# Patient Record
(demographics unavailable — no encounter records)

---

## 2024-10-23 NOTE — HISTORY OF PRESENT ILLNESS
[de-identified] : 75 yo female presents with low back pain.  She states that she has had this pain for years.  She also describes a fairly severe bilateral knee pain.  She denies any bowel bladder issues.  She denies any toxicity.  She does have a history of cancer, breast cancer with known brain metastases.  She denies any new issues with balance.

## 2024-10-23 NOTE — PHYSICAL EXAM
[de-identified] : Gait - Normal  Station - Normal   Sagittal balance - Normal  Compensatory mechanism - None  Heel Walk - Normal  Toe Walk - Normal antalgic gait  Reflexes:   Patellar: Normal   Gastroc: Normal   Clonus: No  Straight leg raise: negative  Pulses: 2+ dp/pt  Range of motion - normal   Sensation    Sensation is intact to light touch in the L1, L2, L3, L4, L5 and S1 dermatomes bilaterally.  Motor              IP           Quad         HS       TA      Gastroc      EHL Right:   3+/5           5/5           5/5      5/5          5/5           5/5 Left:     5/5           5/5           5/5      5/5          5/5           5/5    [de-identified] : xray lumbar spine 4 views Severe facet arthropathy Disc degeneration  Bilateral knee x-rays show severe OA

## 2024-10-23 NOTE — ASSESSMENT
[FreeTextEntry1] :   I had a lengthy discussion with the patient in regards to their treatment plan and diagnosis.  They do have objective weakness findings on my exam.  Their symptoms have persisted despite the conservative management they have attempted thus far.  As a result I would like to proceed with a lumbar MRI.  In tandem with this they should begin physical therapy/home therapy program.  The patient can take Tylenol/NSAIDs as needed for pain control if medically able to.  I will have the patient follow-up in 3 to 4 weeks for repeat clinical evaluation.  I encouraged them to follow-up sooner if their symptoms worsen or change in any way.

## 2024-11-13 NOTE — HISTORY OF PRESENT ILLNESS
[de-identified] : 75 yo female presents with low back pain.  She states that she is still having some fairly severe pain down her left leg.  The pain down her right leg is not as bad.  She is also dealing with some fairly substantial back pain.  She denies any bowel bladder issues.  She denies any saddle anesthesia.  10.23.24 75 yo female presents with low back pain.  She states that she has had this pain for years.  She also describes a fairly severe bilateral knee pain.  She denies any bowel bladder issues.  She denies any toxicity.  She does have a history of cancer, breast cancer with known brain metastases.  She denies any new issues with balance.

## 2024-11-13 NOTE — ASSESSMENT
[FreeTextEntry1] : I had a long discussion with the patient regarding her treatment plan and diagnosis.  She does have symptomatic lumbar radiculopathy in the setting of disc herniations at L3-L4 and L4-L5.  We discussed various treatment options including surgery versus conservative care.  At this point the patient would like to further pursue conservative management of her symptoms.  Surgery was offered.  She would like to follow-up with pain management and an appropriate referral has been placed today.  She should also start a course of medication to help with her pain.  I will see her back in 4 to 5 weeks.  All questions were answered.

## 2024-11-13 NOTE — PHYSICAL EXAM
[de-identified] : Antalgic gait, walks with a cane  Reflexes:   Patellar: Normal   Gastroc: Normal   Clonus: No  Straight leg raise: negative  Pulses: 2+ dp/pt  Range of motion - normal   Sensation    Sensation is intact to light touch in the L1, L2, L3, L4, L5 and S1 dermatomes bilaterally.  Motor              IP           Quad         HS       TA      Gastroc      EHL Right:   4/5           5/5           5/5      5/5          5/5           5/5 Left:     5/5           5/5           5/5      5/5          5/5           5/5    [de-identified] : xray lumbar spine 4 views Severe facet arthropathy Disc degeneration  Bilateral knee x-rays show severe OA  Lumbar MRI reviewed L3-L4 and L4-L5 disc herniations

## 2024-11-20 NOTE — PHYSICAL EXAM
[Restricted in physically strenuous activity but ambulatory and able to carry out work of a light or sedentary nature] : Status 1- Restricted in physically strenuous activity but ambulatory and able to carry out work of a light or sedentary nature, e.g., light house work, office work [Obese] : obese [Normal] : affect appropriate [de-identified] : port in left chest wall c/d/i , chronic ankle/pedal swelling noted  rt. >left.. Chronic stable B/L LE edema  [de-identified] : Right breast- right breast slightly bigger than left breast + skin thickening in the outer lower quadrant with vague fullness. cellulitis resolved. No LN palpable UNCHANGED [de-identified] : bilateral pitting edema+2

## 2024-11-20 NOTE — HISTORY OF PRESENT ILLNESS
[Patient Refusal] : Patient refused psychosocial distress assessment [ECOG Performance Status: 1 - Restricted in physically strenuous activity but ambulatory and able to carry out work of a light or sedentary nature] : Performance Status: 1 - Restricted in physically strenuous activity but ambulatory and able to carry out work of a light or sedentary nature, e.g., light house work, office work [IV] : IV [de-identified] : Ms. Cardozo is a 74-year-old lady who was recently diagnosed with HER-2 positive breast cancer with brain metastasis. She presented today for  an evaluation and to start chemotherapy. Her oncologic history is as follows:  She presented to  [facility] emergency room on 17 with complaints of aphasia for 2 weeks.  A CT head showed new lesion with mixed attenuation in the left temporofrontal region. MRI had done on 17 showed multiple enhancing parenchymal lesion at the gray-white junction with surrounding vasogenic edema suspicious for metastatic disease. No leptomeningeal enhancement was noted. She also underwent a CT scan of chest abdomen and pelvis on the same day which showed right axillary and pectoral lymphadenopathy, irregular mass in the right breast, 1.5 cm celiac axis lymph node and an omental implant  suspicious for peritoneal carcinomatosis. She was started on Keppra by neurology.  She underwent ultrasound-guided biopsy of the right axillary lymphadenopathy on 5/3/17which showed adenocarcinoma of mammary origin, positive for breast markers. ER 95%, PA negative and HER-2/douglas 3+  She completed gamma knife radiation treatment for multiple brain metastases on 17 by Dr. Savage. She denies headaches, balance issues, seizures, change in vision, hearing difficulty, memory problems, localized motor weakness  or comprehension problems.  She went for a second opinion at [facility] [place] and they made the same recommendation (THP)  She was seen in 2019 with c/o worsening cough, wheezing and HOLLAND. Chest CT didn't show pna or POD. She is using inhalers now and is feeling much better. Cough and wheezing resolved.  CT 2020- ? liver lesion, MRI recommended. 2/15/2020 MRI ABD Impression: No focal liver lesions are visualized   Patient complaint of worsening generalized body pain 2021.  CT scan 2020 showed stable disease.  She was sent for a PET/CT to evaluate bony disease. PET/CT 2021 images reviewed: She has mild increase in FDG activity in the thoracolumbar spine.  MRI 3/2021 showed arthritis, spinal stenosis and stable bone mets.  Since there is no clear evidence of progression, we will continue with Herceptin and Perjeta.  She will continue anastrozole.  She reports overall body pain has improved without intervention.  She was also given 2 weeks anastrozole break which did not help. Pain has since resolved. She doesn't take pain meds daily. Tylenol once or twice a week. Discussed ortho f/u and PT prn   2021 She has been doing very well with the current regimen.  She underwent a brain MRI 2021 which showed progression of CNS mets and possibility of leptomeningeal disease.  Patient is asymptomatic.  I sent her for CT chest abdomen pelvis and discussed results with her today.  CT scans essentially do not show any evidence of disease progression outside of CNS. Dr. Lundberg discussed the case with me and concern is that she has significant burden of CNS disease which is currently not being controlled with localized radiation treatments.  She received gamma knife to multiple areas in May 2017 and 2018.  Current progression is in some of the treated mets but some of them are new as well.  I discussed MRI brain and CT chest abdomen pelvis findings with the patient as well as her son on the phone.  I recommend to switch therapy to cover CNS disease as well. We reviewed that she has been treated with standard of care first-line regimen based on HUBERT study.  We reviewed results from HER 2 CLIMB study which showed benefit of adding tucatinib to Herceptin and Xeloda.  Study showed improvement in overall survival as well as progression free survival especially in patients with brain metastases.  Side effect of the regimen reviewed with the patient and son.  Patient has multiple medical comorbidities and is as such taking multiple oral medications.  She was very concerned about adding multiple pills to her daily regimen.  Her son agreed to maintain a pillbox and supervise the pill intake.    She will take:  Capecitabine 1500 mg twice daily 1 week on 1 week off. Tucatinib 300 mg twice a day is the recommended dose but given the concern about significant GI toxicity, I recommended she start with dose reduction.  She will take 1 pill twice daily.  She will also take Imodium 4 times a day and report if she has more than 3-4 bowel movements a day.  Use of antinausea, antidiarrheal and prophylactic use of udder cream to prevent hand-foot syndrome was reviewed with the patient as well. She will continue Herceptin every 3 weeks.  She will stop Perjeta and anastrozole.  Addendum: Pharmacy called me on 2021 and reported that patient did not want to start the regimen.  I presented the case in tumor board on 2021 and consensus opinion was to recommend change in treatment and to start XTH to treat CNS disease.  I discussed that with the patient and explained her to start.  We reviewed side effect profile again.  She will start with the lower dose and see me in 1 week.  Depending on tolerability, we will uptitrate to full dose.  She started treatment 6/15/2021. She was extremely concerned about side effects and multiple pills therefore she is started on a lower dose.  21: she reports no significant toxicity. She had mild fatigue but no nausea vomiting diarrhea mouth sores or hand-foot syndrome. She was taking Imodium as prescribed and reports mild constipation. This is her off week for Xeloda. I recommend to increase tucatinib dose 2 pills twice daily(full dose). I will see her next week. If she has no concerning side effects, we will increase Xeloda dose as well. Last week she took Xeloda 2 and 2 pills (1000 bid) due to concerns about toxicity. She is due for Herceptin next week. We will do blood work next week and titrate up the dose as tolerated.   21: She iS here with her  today. Getting Herceptin today. She is on full dose of tucatinib 2 pills twice daily. Reports that constipation is resolved. She had 3 episodes of semisolid bowel movements, improved with Imodium. She denies nausea or vomiting. No other concerning symptoms. I recommend to start Xeloda and increase the dose to 3 pills twice daily 1 week on 1 week off. She will call if any concerning symptoms specifically diarrhea. Change in dose was discussed with the patient and . Written instructions were provided as well. Appointment for 3 weeks Herceptin and echocardiogram was discussed. Addendum: Blood work from 2021 showed rising creatinine to 1.93. She has diabetes and hypertension. Patient did not report severe diarrhea but this rising creatinine is concerning. I recommend that she comes for IV fluid this week and we will repeat CMP. She will hold Xeloda in the meantime. hold and Iv luids.   21 Saw patient in tx room today she is here for IV fluids #3 this as she has ongoing loose stools and decreased appetite x 1 week. On 7/3/21 she was also noted to have hypocalcemia and elevated creatinine. Xeloda was stopped on 21 and patient was instructed to decrease Tucatinib to 1 pill BID. Since decreasing Tucatinib she has about 4 diarrhea episodes daily. Instructed to continue Imodium prn. She reports  cough productive of scant amount of yellow mucous x 4 days no fever mild SOB when going up the stairs but this is not new. No SOB with regular activity.    2021 Today she reports that she is compliant with pills.  She reports severe fatigue,  unable to do household chores, lower extremity swelling, loose watery diarrhea 2-3 times a day, maximum 4 times, taking Imodium 2-3 times a day.  She has nausea even with water, unable to eat much, poor taste.  She is taking antinausea without much relief.  GERD is severe since PPI was held (interaction with Xeloda), not much relieved with Pepcid. Patient was crying today and said she cannot do it anymore.  I recommend to stop Xeloda and tucatinib for now.  She can restart PPI.  She will be treated with Herceptin today.  We will draw routine labs.  Stat BMP showed that creatinine is stable. 3-month brain MRI follow-up is coming up soon.  We will reevaluate after brain MRI.  Options include either switching to Herceptin Perjeta and radiating the brain lesions or consider Enhertu as it does have some CNS penetration. We will hold off CT imaging for now due to slight bump in creatinine.  Echo ordered today   5/3/2023  Patient seen in tx room today s/p syncopal episode/fall 2022 2/2 hypoglycemia. She reports feeling dizzy prior to episode. She was seen in ER CT head 4/10/23: stable No evidence of acute cortical infarction or hemorrhage. No dizziness headache or other neuro symptoms presently. D/w Dr. Noel RICO to proceed with Kanjinti/perjeta today. Reports knee pain and will f/u PCP 23 ECHO 4/10/23 LVEF 61% No CP or SOB Patient had imaging done on 3/2023. Upon review of CT films, my interpretation is that patient has STABLE disease. I reviewed these findings with the patient   2024 Here for Kanjinti perjeta today No cp sob, no GI sx with HP  She is on Xgeva q 6 month and is noted to have mild hypocalcemia. Ca 8.3  Rec to continue Ca suppls 600 mg BID while on Xgeva  Will repeat CMP today.. BP high frequently, WNL today. Rec to see cardioonc. BS better now MRI 10/2022, 2023 stable. Fb Dr Lundberg. MRI 2023 showed POD, s/p SRS 2023, repeat MRI 2024 SD CT 2023 SD , 2024 SD She is on xgeva q6 m as she has been on it for > 4 yrs. Last dental exam 2021. Last Xgeva  23, 24. Next due 2024 ECHO Q3M 23 LVEF 55% to 60%, 10/2023, ECHo due 2024 She reports family issues, her  has worsening Parkinson's disease and 2 falls. son had hernia surgery  24: Patient is here today for Kanjinti/Perjecta- Q3 weeks.  Patient denies any SOB or C/N/V/D. She reports eating well, with no changes in appetite and no weight change.  Patient is on XGEVA, Q 6 months. Next dose due: 24. Patient denies any dental issues.  Patient reports significant pitting edema in her bilateral extremities x 2 months. Denies that it improves with elevation and can be painful. Office to r/o DVT. Bilateral Venous Duplex ordered, ASAP. Referral to Nephrology and Cardiology (appt: ) also placed. Ms. Cardozo reports she discussed this concern with her PCP at the last visit who instructed her to take 2 Hydrochlorothiazide pills daily to see if s/s resolved. Patient reports s/s have remained consistent. Creatinine has progressively increased over the last year.  Ms. Cardozo underwent her last ECHO on 24, next imaging: OVERDUE. Orders placed for ASAP Imaging. Results showin. Left ventricular cavity is normal. Left ventricular wall thickness is mildly increased. Left ventricular systolic function is normal with an ejection fraction of 67 % by Braden's method of disks. Last CT C/A/P: 14; Stable disease Ms. Cardozo reports her son has successfully healed from his hernia s/x . Unfortunately, her  continue to decline and will be entering a Rehabilitation Facility in [place]. Patient also request a letter to be written on behalf of her sister-in-law coming from [place] to be a caregiver to her spouse as she is unable to care for him given her treatment schedule and general overall health.  Treatment held 2/2 overdue ECHO and new s/s of b/l LE swelling (2+pitting) RTC:  2 months with treatment; patient to obtain ECHO and Cardiology clearance prior to next infusion. CT before next visit  [FreeTextEntry1] : Started THP on 7/10/17, on HP now\par  Anastrazole 1/2018\par  XTH 6/2021- 8/2021\par  HP 8/2021 [de-identified] : Ms. MONSERRAT KENT is here for follow up visit for metastatic breast cancer. She completed THP 7/2017, HP and anastrazole. Progression of disease in the brain, started Xeloda Herceptin and tucatinib 6/2021. MRI 8/2021 showed response but pt could not tolerate XHT regimen despite dose reduction. HP restarted 8/2021 11/2024 Patient is here today for Kanjinti/Perjecta- Q3 weeks.  Patient denies any SOB or C/N/V/D. She reports eating well, with no changes in appetite and no weight change.  Patient is on XGEVA, Q 6 months. Last dose: 7/31/24. Patient denies any dental issues.  Last ECHO: October 2024 good  Last CT Chest/Abdomen/Pelvis completed on 20 JUNE 2024; Stable examination; sclerotic osseous lesions unchanged. Patient established care with Dr. Holman on 24 June 2024. BP high, rec to f/u with him  Patient established care with Dr. Suárez (Nephrology) for CKD. Cr stable  Otherwise feeling well.  F/b Dr Lundberg for brain MRI and brain mets

## 2024-11-20 NOTE — ASSESSMENT
[Palliative] : Goals of care discussed with patient: Palliative [FreeTextEntry1] : This is a 74 year-old very pleasant  lady, with medical history significant for diabetes, dyslipidemia, hypertension and hypothyroidism, who is diagnosed with metastatic stage IV ER positive, ND negative, HER-2/douglas positive breast cancer. She presented with symptomatic brain metastasis and completed gamma knife radiosurgery to the brain metastases. She also has bone mets, lymph node metastasis and peritoneal metastasis. She started THP on 7/10/17. Excellent response to chemo clinically, inflammatory changes resolved, CT scans after 4 cycles showed excellent response except one new sclerotic focus on L3 which was radiated. PET 3/2018 showed good response,Brain MRI shows new small lesion. She is on arimidex + herceptin, perjeta. Progression of disease in the brain, started Xeloda Herceptin and tucatinib 6/2021  METASTATIC BREAST CANCER WITH BRAIN METASTASES: Progression of disease in the brain, started Xeloda Herceptin and tucatinib 6/2021. MRI 8/2021 showed response but pt could not tolerate XHT regimen despite dose reduction. HP restarted 8/2021. Patient is tolerating anastrozole Herceptin Perjeta.  11/2024 Patient is here today for Kanjinti/Perjecta- Q3 weeks.  Continue aromatase inhibitor tolerating well  Patient denies any SOB or C/N/V/D. She reports eating well, with no changes in appetite and no weight change.  Patient is on XGEVA, Q 6 months. Next dose due: Jan 2025. Patient denies any dental issues.  Patient reports significant pitting edema in her bilateral extremities x 2 months. Denies that it improves with elevation and can be painful. Seen by cards and nephro. Nephro does not believe etiology is renal.  Ms. Cardozo echo from oct okay, next due 11/2025 Last CT C/A/P: 6/2024; Stable disease, 1/2025 repeat scans before next visit  continue HP unitl POD   - BRAIN METS MRI 9/2023 POD- s/p SRS MRI 1/2024 SD Continue f/u with Dr Lundberg reminded to [facility] appt with rad onc  - Bone mets- She is on Xgeva q6m. She reports she saw dental 4/2021 and exam was good. Xgeva q 6 month, noted to have mild hypocalcemia. Rec to continue Ca suppls 600 mg BID while on Xgeva Will repeat CMP today, next Xgeva 01/2025 - Peritoneal mets- no ascites. Monitor for now - DM and HTN: We discussed increased risk of herceptin induced cardiomyopathy if BP and DM not controlled. Rec to f/u with cardio, endo, pcp and nephrology  Elevated Creatinine most likely 2/2 diabetes, followed by renal.  - Elevated tumor markers: stable with minor fluctuations noted. Trend q 2 months -Monitor for Herceptin induced cardiotoxicity: Check echo every 3 months. -Lower leg extremity edema. Has vascular appointment later this month.  - HTN: Referred to see cardio onc for htn management, LE Edema  Continue Kanjinti & Perjackieta every 3 weeks XGEVA q 6 months next due Jan/2025 F/u ith rad onc and obtain brain MRI  RTC 3 weeks

## 2024-11-20 NOTE — REASON FOR VISIT
[Follow-Up Visit] : a follow-up [Other: _____] : [unfilled] [FreeTextEntry2] : ER +, HER 2+ breast cancer with brain mets

## 2024-12-17 NOTE — ASSESSMENT
[Palliative] : Goals of care discussed with patient: Palliative [FreeTextEntry1] : This is a 74 year-old very pleasant  lady, with medical history significant for diabetes, dyslipidemia, hypertension and hypothyroidism, who is diagnosed with metastatic stage IV ER positive, [place] negative, HER-2/douglas positive breast cancer. She presented with symptomatic brain metastasis and completed gamma knife radiosurgery to the brain metastases. She also has bone mets, lymph node metastasis and peritoneal metastasis. She started THP on 7/10/17. Excellent response to chemo clinically, inflammatory changes resolved, CT scans after 4 cycles showed excellent response except one new sclerotic focus on L3 which was radiated. PET 3/2018 showed good response,Brain MRI shows new small lesion. She is on arimidex + herceptin, perjeta. Progression of disease in the brain, started Xeloda Herceptin and tucatinib 6/2021  METASTATIC BREAST CANCER WITH BRAIN METASTASES: Progression of disease in the brain, started Xeloda Herceptin and tucatinib 6/2021. MRI 8/2021 showed response but pt could not tolerate XHT regimen despite dose reduction. HP restarted 8/2021. Patient is tolerating anastrozole Herceptin Perjeta.   12/2024 Patient had imaging done on 12/2024 . Upon review of CT films, my interpretation is that patient has STABLE disease. I reviewed these findings with the patient continue HP unitl POD Patient is here today for Kanjinti/Perjecta- Q3 weeks.  Patient denies any SOB or C/N/V/D. She reports eating well, with no changes in appetite and no weight change.    - BRAIN METS MRI 9/2023 POD- s/p SRS MRI 1/2024 SD Continue f/u with Dr Lundberg reminded to [facility] appt with rad onc  - Bone mets- She is on Xgeva q6m. She reports she saw dental 4/2021 and exam was good. Xgeva q 6 month, noted to have mild hypocalcemia. Rec to continue Ca suppls 600 mg BID while on Xgeva Will repeat CMP today, next Xgeva 01/2025 - Peritoneal mets- no ascites. Monitor for now - DM and HTN: We discussed increased risk of herceptin induced cardiomyopathy if BP and DM not controlled. Rec to f/u with cardio, endo, pcp and nephrology  Elevated Creatinine most likely 2/2 diabetes, followed by renal.  - Elevated tumor markers: stable with minor fluctuations noted. Trend q 2 months -Monitor for Herceptin induced cardiotoxicity: Check echo every 3 months. -Lower leg extremity edema. Has vascular appointment later this month.  - HTN: Referred to see cardio onc for htn management, LE Edema  Continue Kanjinti & Perjeta every 3 weeks XGEVA q 6 months next due Jan/2025 F/u ith rad onc and obtain brain MRI  RTC 3 weeks

## 2024-12-17 NOTE — ASSESSMENT
[Palliative] : Goals of care discussed with patient: Palliative [FreeTextEntry1] : This is a 74 year-old very pleasant  lady, with medical history significant for diabetes, dyslipidemia, hypertension and hypothyroidism, who is diagnosed with metastatic stage IV ER positive, MI negative, HER-2/douglas positive breast cancer. She presented with symptomatic brain metastasis and completed gamma knife radiosurgery to the brain metastases. She also has bone mets, lymph node metastasis and peritoneal metastasis. She started THP on 7/10/17. Excellent response to chemo clinically, inflammatory changes resolved, CT scans after 4 cycles showed excellent response except one new sclerotic focus on L3 which was radiated. PET 3/2018 showed good response,Brain MRI shows new small lesion. She is on arimidex + herceptin, perjeta. Progression of disease in the brain, started Xeloda Herceptin and tucatinib 6/2021  METASTATIC BREAST CANCER WITH BRAIN METASTASES: Progression of disease in the brain, started Xeloda Herceptin and tucatinib 6/2021. MRI 8/2021 showed response but pt could not tolerate XHT regimen despite dose reduction. HP restarted 8/2021. Patient is tolerating anastrozole Herceptin Perjeta.   12/2024 Patient had imaging done on 12/2024 . Upon review of CT films, my interpretation is that patient has STABLE disease. I reviewed these findings with the patient continue HP unitl POD Patient is here today for Kanjinti/Perjecta- Q3 weeks.  Patient denies any SOB or C/N/V/D. She reports eating well, with no changes in appetite and no weight change.    - BRAIN METS MRI 9/2023 POD- s/p SRS MRI 1/2024 SD Continue f/u with Dr Lundberg reminded to [facility] appt with rad onc  - Bone mets- She is on Xgeva q6m. She reports she saw dental 4/2021 and exam was good. Xgeva q 6 month, noted to have mild hypocalcemia. Rec to continue Ca suppls 600 mg BID while on Xgeva Will repeat CMP today, next Xgeva 01/2025 - Peritoneal mets- no ascites. Monitor for now - DM and HTN: We discussed increased risk of herceptin induced cardiomyopathy if BP and DM not controlled. Rec to f/u with cardio, endo, pcp and nephrology  Elevated Creatinine most likely 2/2 diabetes, followed by renal.  - Elevated tumor markers: stable with minor fluctuations noted. Trend q 2 months -Monitor for Herceptin induced cardiotoxicity: Check echo every 3 months. -Lower leg extremity edema. Has vascular appointment later this month.  - HTN: Referred to see cardio onc for htn management, LE Edema  Continue Kanjinti & Perjeta every 3 weeks XGEVA q 6 months next due Jan/2025 F/u ith rad onc and obtain brain MRI  RTC 3 weeks

## 2024-12-17 NOTE — PHYSICAL EXAM
[Restricted in physically strenuous activity but ambulatory and able to carry out work of a light or sedentary nature] : Status 1- Restricted in physically strenuous activity but ambulatory and able to carry out work of a light or sedentary nature, e.g., light house work, office work [Obese] : obese [Normal] : affect appropriate [de-identified] : port in left chest wall c/d/i , chronic ankle/pedal swelling noted  rt. >left.. Chronic stable B/L LE edema  [de-identified] : Right breast- right breast slightly bigger than left breast + skin thickening in the outer lower quadrant with vague fullness. cellulitis resolved. No LN palpable UNCHANGED [de-identified] : bilateral pitting edema+2

## 2024-12-17 NOTE — PHYSICAL EXAM
[Obese] : obese [] : no respiratory distress [Heart Rate And Rhythm] : heart rate and rhythm were normal [Oriented To Time, Place, And Person] : oriented to person, place, and time

## 2024-12-17 NOTE — HISTORY OF PRESENT ILLNESS
[Patient Refusal] : Patient refused psychosocial distress assessment [ECOG Performance Status: 1 - Restricted in physically strenuous activity but ambulatory and able to carry out work of a light or sedentary nature] : Performance Status: 1 - Restricted in physically strenuous activity but ambulatory and able to carry out work of a light or sedentary nature, e.g., light house work, office work [IV] : IV [de-identified] : Ms. Cardozo is a 74-year-old lady who was recently diagnosed with HER-2 positive breast cancer with brain metastasis. She presented today for  an evaluation and to start chemotherapy. Her oncologic history is as follows:  She presented to  [facility] emergency room on 17 with complaints of aphasia for 2 weeks.  A CT head showed new lesion with mixed attenuation in the left temporofrontal region. MRI had done on 17 showed multiple enhancing parenchymal lesion at the gray-white junction with surrounding vasogenic edema suspicious for metastatic disease. No leptomeningeal enhancement was noted. She also underwent a CT scan of chest abdomen and pelvis on the same day which showed right axillary and pectoral lymphadenopathy, irregular mass in the right breast, 1.5 cm celiac axis lymph node and an omental implant  suspicious for peritoneal carcinomatosis. She was started on Keppra by neurology.  She underwent ultrasound-guided biopsy of the right axillary lymphadenopathy on 5/3/17which showed adenocarcinoma of mammary origin, positive for breast markers. ER 95%, NC negative and HER-2/douglas 3+  She completed gamma knife radiation treatment for multiple brain metastases on 17 by Dr. Savage. She denies headaches, balance issues, seizures, change in vision, hearing difficulty, memory problems, localized motor weakness  or comprehension problems.  She went for a second opinion at [facility] and they made the same recommendation (THP)  She was seen in 2019 with c/o worsening cough, wheezing and HOLLAND. Chest CT didn't show pna or POD. She is using inhalers now and is feeling much better. Cough and wheezing resolved.  CT 2020- ? liver lesion, MRI recommended. 2/15/2020 MRI ABD Impression: No focal liver lesions are visualized   Patient complaint of worsening generalized body pain 2021.  CT scan 2020 showed stable disease.  She was sent for a PET/CT to evaluate bony disease. PET/CT 2021 images reviewed: She has mild increase in FDG activity in the thoracolumbar spine.  MRI 3/2021 showed arthritis, spinal stenosis and stable bone mets.  Since there is no clear evidence of progression, we will continue with Herceptin and Perjeta.  She will continue anastrozole.  She reports overall body pain has improved without intervention.  She was also given 2 weeks anastrozole break which did not help. Pain has since resolved. She doesn't take pain meds daily. Tylenol once or twice a week. Discussed ortho f/u and PT prn   2021 She has been doing very well with the current regimen.  She underwent a brain MRI 2021 which showed progression of CNS mets and possibility of leptomeningeal disease.  Patient is asymptomatic.  I sent her for CT chest abdomen pelvis and discussed results with her today.  CT scans essentially do not show any evidence of disease progression outside of CNS. Dr. Lundberg discussed the case with me and concern is that she has significant burden of CNS disease which is currently not being controlled with localized radiation treatments.  She received gamma knife to multiple areas in May 2017 and 2018.  Current progression is in some of the treated mets but some of them are new as well.  I discussed MRI brain and CT chest abdomen pelvis findings with the patient as well as her son on the phone.  I recommend to switch therapy to cover CNS disease as well. We reviewed that she has been treated with standard of care first-line regimen based on HUBERT study.  We reviewed results from HER 2 CLIMB study which showed benefit of adding tucatinib to Herceptin and Xeloda.  Study showed improvement in overall survival as well as progression free survival especially in patients with brain metastases.  Side effect of the regimen reviewed with the patient and son.  Patient has multiple medical comorbidities and is as such taking multiple oral medications.  She was very concerned about adding multiple pills to her daily regimen.  Her son agreed to maintain a pillbox and supervise the pill intake.    She will take:  Capecitabine 1500 mg twice daily 1 week on 1 week off. Tucatinib 300 mg twice a day is the recommended dose but given the concern about significant GI toxicity, I recommended she start with dose reduction.  She will take 1 pill twice daily.  She will also take Imodium 4 times a day and report if she has more than 3-4 bowel movements a day.  Use of antinausea, antidiarrheal and prophylactic use of udder cream to prevent hand-foot syndrome was reviewed with the patient as well. She will continue Herceptin every 3 weeks.  She will stop Perjeta and anastrozole.  Addendum: Pharmacy called me on 2021 and reported that patient did not want to start the regimen.  I presented the case in tumor board on 2021 and consensus opinion was to recommend change in treatment and to start XTH to treat CNS disease.  I discussed that with the patient and explained her to start.  We reviewed side effect profile again.  She will start with the lower dose and see me in 1 week.  Depending on tolerability, we will uptitrate to full dose.  She started treatment 6/15/2021. She was extremely concerned about side effects and multiple pills therefore she is started on a lower dose.  21: she reports no significant toxicity. She had mild fatigue but no nausea vomiting diarrhea mouth sores or hand-foot syndrome. She was taking Imodium as prescribed and reports mild constipation. This is her off week for Xeloda. I recommend to increase tucatinib dose 2 pills twice daily(full dose). I will see her next week. If she has no concerning side effects, we will increase Xeloda dose as well. Last week she took Xeloda 2 and 2 pills (1000 bid) due to concerns about toxicity. She is due for Herceptin next week. We will do blood work next week and titrate up the dose as tolerated.   21: She iS here with her  today. Getting Herceptin today. She is on full dose of tucatinib 2 pills twice daily. Reports that constipation is resolved. She had 3 episodes of semisolid bowel movements, improved with Imodium. She denies nausea or vomiting. No other concerning symptoms. I recommend to start Xeloda and increase the dose to 3 pills twice daily 1 week on 1 week off. She will call if any concerning symptoms specifically diarrhea. Change in dose was discussed with the patient and . Written instructions were provided as well. Appointment for 3 weeks Herceptin and echocardiogram was discussed. Addendum: Blood work from 2021 showed rising creatinine to 1.93. She has diabetes and hypertension. Patient did not report severe diarrhea but this rising creatinine is concerning. I recommend that she comes for IV fluid this week and we will repeat CMP. She will hold Xeloda in the meantime. hold and Iv luids.   21 Saw patient in tx room today she is here for IV fluids #3 this as she has ongoing loose stools and decreased appetite x 1 week. On 7/3/21 she was also noted to have hypocalcemia and elevated creatinine. Xeloda was stopped on 21 and patient was instructed to decrease Tucatinib to 1 pill BID. Since decreasing Tucatinib she has about 4 diarrhea episodes daily. Instructed to continue Imodium prn. She reports  cough productive of scant amount of yellow mucous x 4 days no fever mild SOB when going up the stairs but this is not new. No SOB with regular activity.    2021 Today she reports that she is compliant with pills.  She reports severe fatigue,  unable to do household chores, lower extremity swelling, loose watery diarrhea 2-3 times a day, maximum 4 times, taking Imodium 2-3 times a day.  She has nausea even with water, unable to eat much, poor taste.  She is taking antinausea without much relief.  GERD is severe since PPI was held (interaction with Xeloda), not much relieved with Pepcid. Patient was crying today and said she cannot do it anymore.  I recommend to stop Xeloda and tucatinib for now.  She can restart PPI.  She will be treated with Herceptin today.  We will draw routine labs.  Stat BMP showed that creatinine is stable. 3-month brain MRI follow-up is coming up soon.  We will reevaluate after brain MRI.  Options include either switching to Herceptin Perjeta and radiating the brain lesions or consider Enhertu as it does have some CNS penetration. We will hold off CT imaging for now due to slight bump in creatinine.  Echo ordered today   5/3/2023  Patient seen in tx room today s/p syncopal episode/fall 2022 2/2 hypoglycemia. She reports feeling dizzy prior to episode. She was seen in ER CT head 4/10/23: stable No evidence of acute cortical infarction or hemorrhage. No dizziness headache or other neuro symptoms presently. D/w Dr. Noel RICO to proceed with Kanjinti/perjeta today. Reports knee pain and will f/u PCP 23 ECHO 4/10/23 LVEF 61% No CP or SOB Patient had imaging done on 3/2023. Upon review of CT films, my interpretation is that patient has STABLE disease. I reviewed these findings with the patient   2024 Here for Kanjinti perjeta today No cp sob, no GI sx with HP  She is on Xgeva q 6 month and is noted to have mild hypocalcemia. Ca 8.3  Rec to continue Ca suppls 600 mg BID while on Xgeva  Will repeat CMP today.. BP high frequently, WNL today. Rec to see cardioonc. BS better now MRI 10/2022, 2023 stable. Fb Dr Lundberg. MRI 2023 showed POD, s/p SRS 2023, repeat MRI 2024 SD CT 2023 SD , 2024 SD She is on xgeva q6 m as she has been on it for > 4 yrs. Last dental exam 2021. Last Xgeva  23, 24. Next due 2024 ECHO Q3M 23 LVEF 55% to 60%, 10/2023, ECHo due 2024 She reports family issues, her  has worsening Parkinson's disease and 2 falls. son had hernia surgery  24: Patient is here today for Kanjinti/Perjecta- Q3 weeks.  Patient denies any SOB or C/N/V/D. She reports eating well, with no changes in appetite and no weight change.  Patient is on XGEVA, Q 6 months. Next dose due: 24. Patient denies any dental issues.  Patient reports significant pitting edema in her bilateral extremities x 2 months. Denies that it improves with elevation and can be painful. Office to r/o DVT. Bilateral Venous Duplex ordered, ASAP. Referral to Nephrology and Cardiology (appt: ) also placed. Ms. Cardozo reports she discussed this concern with her PCP at the last visit who instructed her to take 2 Hydrochlorothiazide pills daily to see if s/s resolved. Patient reports s/s have remained consistent. Creatinine has progressively increased over the last year.  Ms. Cardozo underwent her last ECHO on 24, next imaging: OVERDUE. Orders placed for ASAP Imaging. Results showin. Left ventricular cavity is normal. Left ventricular wall thickness is mildly increased. Left ventricular systolic function is normal with an ejection fraction of 67 % by Braden's method of disks. Last CT C/A/P: 14; Stable disease Ms. Cardozo reports her son has successfully healed from his hernia s/x . Unfortunately, her  continue to decline and will be entering a Rehabilitation Facility in [place]. Patient also request a letter to be written on behalf of her sister-in-law coming from [place] to be a caregiver to her spouse as she is unable to care for him given her treatment schedule and general overall health.  Treatment held 2/2 overdue ECHO and new s/s of b/l LE swelling (2+pitting) RTC:  2 months with treatment; patient to obtain ECHO and Cardiology clearance prior to next infusion. CT before next visit  [FreeTextEntry1] : Started THP on 7/10/17, on HP now\par  Anastrazole 1/2018\par  XTH 6/2021- 8/2021\par  HP 8/2021 [de-identified] : Ms. MONSERRAT KENT is here for follow up visit for metastatic breast cancer. She completed THP 7/2017, HP and anastrazole. Progression of disease in the brain, started Xeloda Herceptin and tucatinib 6/2021. MRI 8/2021 showed response but pt could not tolerate XHT regimen despite dose reduction. HP restarted 8/2021 12/2024 Patient had imaging done on 12/2024 . Upon review of CT films, my interpretation is that patient has STABLE disease. I reviewed these findings with the patient Patient is here today for Kanjinti/Perjecta- Q3 weeks.  Patient denies any SOB or C/N/V/D. She reports eating well, with no changes in appetite and no weight change.  Patient is on XGEVA, Q 6 months. Last dose: 7/31/24. Patient denies any dental issues.  Next added for January 22, 2025 Last ECHO: October 2024 good  Last CT Chest/Abdomen/Pelvis completed on 20 JUNE 2024; Stable examination; sclerotic osseous lesions unchanged. Patient established care with Dr. Holman on 24 June 2024. BP high, rec to f/u with him  Patient established care with Dr. Suárez (Nephrology) for CKD. Cr stable  Otherwise feeling well.  F/b Dr Lundberg for brain MRI and brain mets

## 2024-12-17 NOTE — PHYSICAL EXAM
[Restricted in physically strenuous activity but ambulatory and able to carry out work of a light or sedentary nature] : Status 1- Restricted in physically strenuous activity but ambulatory and able to carry out work of a light or sedentary nature, e.g., light house work, office work [Obese] : obese [Normal] : affect appropriate [de-identified] : port in left chest wall c/d/i , chronic ankle/pedal swelling noted  rt. >left.. Chronic stable B/L LE edema  [de-identified] : Right breast- right breast slightly bigger than left breast + skin thickening in the outer lower quadrant with vague fullness. cellulitis resolved. No LN palpable UNCHANGED [de-identified] : bilateral pitting edema+2

## 2024-12-17 NOTE — HISTORY OF PRESENT ILLNESS
[Patient Refusal] : Patient refused psychosocial distress assessment [ECOG Performance Status: 1 - Restricted in physically strenuous activity but ambulatory and able to carry out work of a light or sedentary nature] : Performance Status: 1 - Restricted in physically strenuous activity but ambulatory and able to carry out work of a light or sedentary nature, e.g., light house work, office work [IV] : IV [de-identified] : Ms. Cardozo is a 74-year-old lady who was recently diagnosed with HER-2 positive breast cancer with brain metastasis. She presented today for  an evaluation and to start chemotherapy. Her oncologic history is as follows:  She presented to  [facility] emergency room on 17 with complaints of aphasia for 2 weeks.  A CT head showed new lesion with mixed attenuation in the left temporofrontal region. MRI had done on 17 showed multiple enhancing parenchymal lesion at the gray-white junction with surrounding vasogenic edema suspicious for metastatic disease. No leptomeningeal enhancement was noted. She also underwent a CT scan of chest abdomen and pelvis on the same day which showed right axillary and pectoral lymphadenopathy, irregular mass in the right breast, 1.5 cm celiac axis lymph node and an omental implant  suspicious for peritoneal carcinomatosis. She was started on Keppra by neurology.  She underwent ultrasound-guided biopsy of the right axillary lymphadenopathy on 5/3/17which showed adenocarcinoma of mammary origin, positive for breast markers. ER 95%, HI negative and HER-2/douglas 3+  She completed gamma knife radiation treatment for multiple brain metastases on 17 by Dr. Savage. She denies headaches, balance issues, seizures, change in vision, hearing difficulty, memory problems, localized motor weakness  or comprehension problems.  She went for a second opinion at [facility] [place] and they made the same recommendation (THP)  She was seen in 2019 with c/o worsening cough, wheezing and HOLLAND. Chest CT didn't show pna or POD. She is using inhalers now and is feeling much better. Cough and wheezing resolved.  CT 2020- ? liver lesion, MRI recommended. 2/15/2020 MRI ABD Impression: No focal liver lesions are visualized   Patient complaint of worsening generalized body pain 2021.  CT scan 2020 showed stable disease.  She was sent for a PET/CT to evaluate bony disease. PET/CT 2021 images reviewed: She has mild increase in FDG activity in the thoracolumbar spine.  MRI 3/2021 showed arthritis, spinal stenosis and stable bone mets.  Since there is no clear evidence of progression, we will continue with Herceptin and Perjeta.  She will continue anastrozole.  She reports overall body pain has improved without intervention.  She was also given 2 weeks anastrozole break which did not help. Pain has since resolved. She doesn't take pain meds daily. Tylenol once or twice a week. Discussed ortho f/u and PT prn   2021 She has been doing very well with the current regimen.  She underwent a brain MRI 2021 which showed progression of CNS mets and possibility of leptomeningeal disease.  Patient is asymptomatic.  I sent her for CT chest abdomen pelvis and discussed results with her today.  CT scans essentially do not show any evidence of disease progression outside of CNS. Dr. Lundberg discussed the case with me and concern is that she has significant burden of CNS disease which is currently not being controlled with localized radiation treatments.  She received gamma knife to multiple areas in May 2017 and 2018.  Current progression is in some of the treated mets but some of them are new as well.  I discussed MRI brain and CT chest abdomen pelvis findings with the patient as well as her son on the phone.  I recommend to switch therapy to cover CNS disease as well. We reviewed that she has been treated with standard of care first-line regimen based on HUBERT study.  We reviewed results from HER 2 CLIMB study which showed benefit of adding tucatinib to Herceptin and Xeloda.  Study showed improvement in overall survival as well as progression free survival especially in patients with brain metastases.  Side effect of the regimen reviewed with the patient and son.  Patient has multiple medical comorbidities and is as such taking multiple oral medications.  She was very concerned about adding multiple pills to her daily regimen.  Her son agreed to maintain a pillbox and supervise the pill intake.    She will take:  Capecitabine 1500 mg twice daily 1 week on 1 week off. Tucatinib 300 mg twice a day is the recommended dose but given the concern about significant GI toxicity, I recommended she start with dose reduction.  She will take 1 pill twice daily.  She will also take Imodium 4 times a day and report if she has more than 3-4 bowel movements a day.  Use of antinausea, antidiarrheal and prophylactic use of udder cream to prevent hand-foot syndrome was reviewed with the patient as well. She will continue Herceptin every 3 weeks.  She will stop Perjeta and anastrozole.  Addendum: Pharmacy called me on 2021 and reported that patient did not want to start the regimen.  I presented the case in tumor board on 2021 and consensus opinion was to recommend change in treatment and to start XTH to treat CNS disease.  I discussed that with the patient and explained her to start.  We reviewed side effect profile again.  She will start with the lower dose and see me in 1 week.  Depending on tolerability, we will uptitrate to full dose.  She started treatment 6/15/2021. She was extremely concerned about side effects and multiple pills therefore she is started on a lower dose.  21: she reports no significant toxicity. She had mild fatigue but no nausea vomiting diarrhea mouth sores or hand-foot syndrome. She was taking Imodium as prescribed and reports mild constipation. This is her off week for Xeloda. I recommend to increase tucatinib dose 2 pills twice daily(full dose). I will see her next week. If she has no concerning side effects, we will increase Xeloda dose as well. Last week she took Xeloda 2 and 2 pills (1000 bid) due to concerns about toxicity. She is due for Herceptin next week. We will do blood work next week and titrate up the dose as tolerated.   21: She iS here with her  today. Getting Herceptin today. She is on full dose of tucatinib 2 pills twice daily. Reports that constipation is resolved. She had 3 episodes of semisolid bowel movements, improved with Imodium. She denies nausea or vomiting. No other concerning symptoms. I recommend to start Xeloda and increase the dose to 3 pills twice daily 1 week on 1 week off. She will call if any concerning symptoms specifically diarrhea. Change in dose was discussed with the patient and . Written instructions were provided as well. Appointment for 3 weeks Herceptin and echocardiogram was discussed. Addendum: Blood work from 2021 showed rising creatinine to 1.93. She has diabetes and hypertension. Patient did not report severe diarrhea but this rising creatinine is concerning. I recommend that she comes for IV fluid this week and we will repeat CMP. She will hold Xeloda in the meantime. hold and Iv luids.   21 Saw patient in tx room today she is here for IV fluids #3 this as she has ongoing loose stools and decreased appetite x 1 week. On 7/3/21 she was also noted to have hypocalcemia and elevated creatinine. Xeloda was stopped on 21 and patient was instructed to decrease Tucatinib to 1 pill BID. Since decreasing Tucatinib she has about 4 diarrhea episodes daily. Instructed to continue Imodium prn. She reports  cough productive of scant amount of yellow mucous x 4 days no fever mild SOB when going up the stairs but this is not new. No SOB with regular activity.    2021 Today she reports that she is compliant with pills.  She reports severe fatigue,  unable to do household chores, lower extremity swelling, loose watery diarrhea 2-3 times a day, maximum 4 times, taking Imodium 2-3 times a day.  She has nausea even with water, unable to eat much, poor taste.  She is taking antinausea without much relief.  GERD is severe since PPI was held (interaction with Xeloda), not much relieved with Pepcid. Patient was crying today and said she cannot do it anymore.  I recommend to stop Xeloda and tucatinib for now.  She can restart PPI.  She will be treated with Herceptin today.  We will draw routine labs.  Stat BMP showed that creatinine is stable. 3-month brain MRI follow-up is coming up soon.  We will reevaluate after brain MRI.  Options include either switching to Herceptin Perjeta and radiating the brain lesions or consider Enhertu as it does have some CNS penetration. We will hold off CT imaging for now due to slight bump in creatinine.  Echo ordered today   5/3/2023  Patient seen in tx room today s/p syncopal episode/fall 2022 2/2 hypoglycemia. She reports feeling dizzy prior to episode. She was seen in ER CT head 4/10/23: stable No evidence of acute cortical infarction or hemorrhage. No dizziness headache or other neuro symptoms presently. D/w Dr. Noel RICO to proceed with Kanjinti/perjeta today. Reports knee pain and will f/u PCP 23 ECHO 4/10/23 LVEF 61% No CP or SOB Patient had imaging done on 3/2023. Upon review of CT films, my interpretation is that patient has STABLE disease. I reviewed these findings with the patient   2024 Here for Kanjinti perjeta today No cp sob, no GI sx with HP  She is on Xgeva q 6 month and is noted to have mild hypocalcemia. Ca 8.3  Rec to continue Ca suppls 600 mg BID while on Xgeva  Will repeat CMP today.. BP high frequently, WNL today. Rec to see cardioonc. BS better now MRI 10/2022, 2023 stable. Fb Dr Lundberg. MRI 2023 showed POD, s/p SRS 2023, repeat MRI 2024 SD CT 2023 SD , 2024 SD She is on xgeva q6 m as she has been on it for > 4 yrs. Last dental exam 2021. Last Xgeva  23, 24. Next due 2024 ECHO Q3M 23 LVEF 55% to 60%, 10/2023, ECHo due 2024 She reports family issues, her  has worsening Parkinson's disease and 2 falls. son had hernia surgery  24: Patient is here today for Kanjinti/Perjecta- Q3 weeks.  Patient denies any SOB or C/N/V/D. She reports eating well, with no changes in appetite and no weight change.  Patient is on XGEVA, Q 6 months. Next dose due: 24. Patient denies any dental issues.  Patient reports significant pitting edema in her bilateral extremities x 2 months. Denies that it improves with elevation and can be painful. Office to r/o DVT. Bilateral Venous Duplex ordered, ASAP. Referral to Nephrology and Cardiology (appt: ) also placed. Ms. Cardozo reports she discussed this concern with her PCP at the last visit who instructed her to take 2 Hydrochlorothiazide pills daily to see if s/s resolved. Patient reports s/s have remained consistent. Creatinine has progressively increased over the last year.  Ms. Cardozo underwent her last ECHO on 24, next imaging: OVERDUE. Orders placed for ASAP Imaging. Results showin. Left ventricular cavity is normal. Left ventricular wall thickness is mildly increased. Left ventricular systolic function is normal with an ejection fraction of 67 % by Braden's method of disks. Last CT C/A/P: 14; Stable disease Ms. Cardozo reports her son has successfully healed from his hernia s/x . Unfortunately, her  continue to decline and will be entering a Rehabilitation Facility in [place]. Patient also request a letter to be written on behalf of her sister-in-law coming from [place] to be a caregiver to her spouse as she is unable to care for him given her treatment schedule and general overall health.  Treatment held 2/2 overdue ECHO and new s/s of b/l LE swelling (2+pitting) RTC:  2 months with treatment; patient to obtain ECHO and Cardiology clearance prior to next infusion. CT before next visit  [FreeTextEntry1] : Started THP on 7/10/17, on HP now\par  Anastrazole 1/2018\par  XTH 6/2021- 8/2021\par  HP 8/2021 [de-identified] : Ms. MONSERRAT KENT is here for follow up visit for metastatic breast cancer. She completed THP 7/2017, HP and anastrazole. Progression of disease in the brain, started Xeloda Herceptin and tucatinib 6/2021. MRI 8/2021 showed response but pt could not tolerate XHT regimen despite dose reduction. HP restarted 8/2021 12/2024 Patient had imaging done on 12/2024 . Upon review of CT films, my interpretation is that patient has STABLE disease. I reviewed these findings with the patient Patient is here today for Kanjinti/Perjecta- Q3 weeks.  Patient denies any SOB or C/N/V/D. She reports eating well, with no changes in appetite and no weight change.  Patient is on XGEVA, Q 6 months. Last dose: 7/31/24. Patient denies any dental issues.  Next added for January 22, 2025 Last ECHO: October 2024 good  Last CT Chest/Abdomen/Pelvis completed on 20 JUNE 2024; Stable examination; sclerotic osseous lesions unchanged. Patient established care with Dr. Holman on 24 June 2024. BP high, rec to f/u with him  Patient established care with Dr. Suárez (Nephrology) for CKD. Cr stable  Otherwise feeling well.  F/b Dr Lundberg for brain MRI and brain mets

## 2024-12-18 NOTE — VITALS
[Maximal Pain Intensity: 0/10] : 0/10 [Least Pain Intensity: 0/10] : 0/10 [OTC] : OTC [90: Able to carry normal activity; minor signs or symptoms of disease.] : 90: Able to carry normal activity; minor signs or symptoms of disease.

## 2024-12-20 NOTE — HISTORY OF PRESENT ILLNESS
[FreeTextEntry1] :  Ms. Cardozo presents with a HER-2 positive breast cancer diagnosed in 2017 with multiple brain metastasis s/p gamma knife in 5/2017 to a right frontal, right parietal, left insular, left parietal, left parietotemporal, and right medial temporal areas. She completed additional gamma knife radiation to a right temporal, left parietal, right cerebella, and right frontal metastasis 12/18/18.  She additionally completed gamma knife to a right parietal and right frontal met on 12/3/2020.  Ms. Cardozo completed SRS on the LINAC for a total of 2000 cgy to the left cerebellum on 1/25/2022. GKRS Right caudate lesion 9/25/2023 2000cgy over 1 Fx.  Oncologic history: She initially presented with aphasia to the [facility] ER in late April 2017. CT head demonstrated a left temporofrontal lesion and MRI demonstrated multiple enhancing lesions with surrounding vasogenic edema without leptomeningeal enhancement. She underwent a CT Chest, Abdomen, and Pelvis demonstrating right axillary and pectoral lymphadenopathy, an irregular mass in the right breast, a 1.5 cm celiac axis lymph node and an omental implant suspicious for peritoneal carcinomatosis. She underwent right axillary ultrasound guided biopsy demonstrating ER+/AL negative/Her2 positive breast cancer. She underwent gamma knife SRS to multiple brain metastasis on 5/30/17 and did well. She then went on THP chemotherapy in July 2017, transitioned to HP chemotherapy, and  anastrozole as well.  PET/CT on 3/17/18 demonstrated increased FDG activity in the right breast with SUV 3.2, previously 2.7 with FDG-avid skin thickening overlying the breast. Otherwise it demonstrated a non-specific sclerotic lesion in the body of T8 and a lucent lesion in the body of T5 without increased FDG activity. MRI brain demonstrated a new tiny focus of abnormal enhancement in the right cerebellum measuring 0.2 mm with otherwise stable/decreasing size/enhancement of metastases. She was not a candidate for IA herceptin. She saw Dr. Phipps in April who recommended expectant management. She is now on arimidex + herceptin, perjeta.   7/19/19 Repeat MRI in June 2018 showed overall stable or diminished lesion but demonstrated one area of abnormal enhancement v artifact seen in left parietal region. PET/CT in June 2018 was stable with no new disease.  Today, patient reports no HA, vision changes, or dizziness. She reports new left leg numbness and pins/needle sensation on the anterior surface of her legs and pain radiation down from her back to her toes. She has a history of sciatica but the numbness is new. Ms. Cardozo denies urinary retention or lower extremity weakness. She is scheduled for MR of the spine on Sunday. In addition, she also reports problem with sleeping as well as diffuse aches in her shoulders, arms, and legs  12/5/18- Ms. Cardozo presents today for follow up . Since she saw us last she has continued to follow with Dr. Shaunna Cohen. Has continued on arimidex.  MRI brain done 11/30/18 showed multiple new brain mets worrisome for metastatic disease. Today she notes she has headaches, 2-3/10/ in varying spots on her head. These have been long standing, even before treatment. Denies focal weakness. Notes numbness to the right leg for the past week. Denies dizziness, trouble walking. Has lower longstanding, currently having injections to her back every two weeks. CT CAP done 11/19/18 showed sclerotic lesions in several vertebral bodies and the left hemisacrum, suspicious for metastatic disease,stable since 10/6/18  3/6/19- Ms. Cardozo presents today for follow up. She underwent gamma knife on 12/18/18 to four focuses of disease, including right temporal left parietal, right cerebellar, and right frontal. She continues on arimidex and xgeva. Today she notes on and off headaches, which are stable for a long time, relieved by tylenol. She notes a new development of numbness to her lips, with a feeling that something is walking on her face. She denies focal weakness, confusion, seizures, dizziness, trouble with vision.  3/26/19- Ms. Cardozo presents today for follow up. She underwent a brain MRI on 3/19/19 which showed previously noted tiny areas of abnormal enhancement in the posterior fossa tentorial region are no longer seen which is likely compatible with response to therapy. Clinical correlation and continued close follow up is recommended.  CT CAP showed stable osseous mets. Continues on xgeva and anastrazole.  Today she still has headaches that come and go and are unchanged. She still has intermittent numbness to her face. She denies confusion, dizziness, visual difficulties, trouble swallowing, trouble hearing. She has intermittent numbness to the right toes.  She recently completed antibiotics for bronchitis, still with some residual cough.   7/3/19- Ms. Cardozo presents today for follow up. She has continued to follow with Dr. Shaunna Cohen. She continues on arimidex, herceptin, and perjeta. Due for next body scans in 8/2019.  MRI 7/2/19 showed In comparison with 3/19/2019, no significant interval change, previous noted enhancing foci are again noted longer well appreciated. There is redemonstration of foci of hyperintense T2 and FLAIR signal within the right frontal, left subinsular and temporal lobes without interval change from prior. There is no new large focal abnormal areas of enhancement, restricted diffusion, hemorrhage or midline shift.   Today she feels well. Still has very slight intermittent headaches, not bothersome. Facial numbness is improved. Denies dizziness, trouble with balance, trouble with vision, nausea, vomiting.   11/19/2020- Ms. Cardozo presents today for follow up. She is seen today through TELEPHONE for which she provides verbal consent on 11/19/2020 at 2:39 PM. She has continued to follow with Dr. Cohen.  She is on armimidex, herceptin, and perjeta.  MRI brain in 2/2020 was stable, however her brain MRI 11/3/2020 showed: -Area of abnormal T1 and T2 prolongation with associated enhancement is again seen involving the left posterior temporal cortex. This finding continues to increase when compared with the prior study and currently measures approximately 0.7 x 1.1 cm. This finding is worrisome for an underlying lesion such as metastasis given patient's history.  -Small focus of abnormal enhancement involving the high left frontal cortex (series 9 image 132). This finding measures approximately 0.6 cm and previously measured approximately 0.2 cm. -Abnormal enhancement involving the right posterior temporal/inferior parietal cortex is again seen. This finding measures approximately 0.9 cm and previously measured approximately 0.6 cm. -Tiny area of abnormal enhancement is seen involving the left frontal subcortical region. This best seen on series 10 image 17 and measures approximately 0.2 cm. -Tiny focus of enhancement involving the high left frontal cortex (series 9 image 131). This finding measures approximately 0.5 cm.  Most recent body imaging 11/3/2020 showed a stable appearance of the chest, abdomen, and pelvis.   Today she feels well. denies headaches. She notes some pain to her lower back, which is longstanding, though has gotten worse in recent months. sometimes with numbness in the legs.  Denies nausea, vomiting, focal weakness.   2/17/2021- Ms. Cardozo presents today for follow up. She underwent a brain MRI  2/17/2021. This revealed Compared to the previous studies, the enhancing lesion in the right parietal lobe has resolved.  The lesions in the left anterior insula, left temporal lobe and left inferior cerebellum remain stable in size. Right occipital FLAIR hyperintensity without appreciable enhancement has also remained stable.  Continues following with Dr. Cohen. Remains on anastrazole, herceptin, and perjeta.  Today she denies headaches, nausea, vomiting. Has some intermittent numbness to the right face which is stable. She has back pain to the lower back and bilateral upper and lower extremities. This pain is stable.  5/25/2021- Ms. Cardozo presents today follow up. She continues to follow with Dr. Cohen and continues on Herceptin, Perjeta, and Arimidex.  Today she notes left eye pain, and feels her right eye is bulging. She has left fontal intermittent headaches that do not require medications. Chronic bilateral LE pain. She has numbness to her face.   MRI brain 5/20/2021 showed Abnormal lesions in the posterior fossa and supratentorial region. Some of these lesions have increased in size which is worrisome for progression of patient's underlying disease process. Clinical correlation continued close interval follow-up is recommended.  9/21/2021- Ms. Cardozo presents today for follow up. MRI brain done 8/23/2021 showed decreased size and enhancement of left inferior cerebellar lesion. Grossly stable bilateral temporal and left insular lesions. No new lesions.  Last body imaging CT CAP 5/29/2021 showed Stable findings. Osseous metastatic disease is without change in the chest and abdomen. Continues to follow with Dr. Cohen. Xeloda tucatinib herceptin started 6/15/2021 due to POD in brain however was discontinued in 8/2021 due to fatigue, diarrhea, SILAS, LE swelling, and pain. She is back on anastrazole herceptin perjeta.   Today she is feeling ok. She notes some headaches from time to time which have been stable over a long time. Stable facial numbness. no new nausea, vomiting, weakness. remains bothered by fluid retention which started after taking xeloda/tucatinib.  12/22/2021: Patient presents today for follow-up. She discontinued tucatinib/xeloda due to poor tolerability.  She presents with a new MRI.   MRI Head w/wo IV contrast (12/18/2021) shows 5 SMALL LESIONS AS DESCRIBED, 2 OF WHICH ARE STABLE, 2 WHICH HAVE MILDLY INCREASED IN SIZE, AND ONE WHICH APPEARS NEW. FINDINGS SUGGEST MILD INTERVAL PROGRESSION OF PATIENT'S KNOWN METASTATIC DISEASE. RECOMMEND CLINICAL CORRELATION AND INTERVAL FOLLOW-UP. Today denies denies complaints.   1/19/2022: Pt presents for follow-up. MRI 1/19/22 shows further slight progression of the cerebellar lesion while other lesions remain stable.    3/16/2022: Pt presents for PTE. Completed Linac SRS 2000 cgy/1fx to L cerebellum on 1/25/22. Reports feeling well overall. Having occasional HA (3/10, no medication). Notes mild pain and weakness in L shoulder, but denies general muscle weakness. Continues to have numbness in hands following secondary to chemo. Currently receiving Herceptin, Perjeta, and Anastrazole with Dr. Cohen.  4/14/2022- Ms. Cardozo presents today for PTE. Her MRI was done on 4/13, there is no final MRI but appears to be a good response to the left cerebellar lesion. Continues on kanjinti and perjeta.  CT CAP 3/26/2022 showed Stable examination. Sclerotic densities in bone which have not significantly changed.  Today she feels well. notes slight intermittent headaches, unchanged recently. no new focal weakness. notes a bald spot to her posterior head  7/13/2022 - Ms. Cardozo presents today for follow up.  Continues to follow with Dr. Cohen. Continues on herceptin and xgeva.  Brain MRI done 7/10/2022.  Body imaging 3/2022 stable.  10/26/2022- Ms. Cardozo presents today for follow up.  CT CAP 9/7/2022 showed Stable examination. Sclerotic densities in bone which have not significantly changed.  MRI brain 10/21/2022 showed New punctate focus of enhancement within the right caudate nucleus. Enhancing left insular nodule appears minimally larger measuring 6 x 5 mm, previously measured 5 x 4 mm. Slight increased enhancement associated with the anterior right frontal FLAIR signal abnormality.  Enhancing nodule in the left temporal region appears essentially stable. New linear focus of enhancement just medial to the lesion may be vascular in nature. Stable signal abnormality with vague enhancement anteromedial aspect of the right temporal lobe.  Findings suggest mild disease progression. Recommend clinical correlation and close interval follow-up.  continues on herceptin/perjeta.  Feeling well today. no headaches, no nausea, no focal weakness or confusion or dizziness.  1/25/2023- Ms. Cardozo presents today for follow up. Seen through TELEPHONE for which she consents on 1/25/2023 at 3:10 PM. MRI brain done 12/21/2022 showed Mild interval increase in size of the small focus of enhancement within the right caudate body currently measuring 3 mm, previously 2 mm. Other areas of signal abnormality and enhancement are stable. Please see report for description.  No new lesions seen.  Continues to follow with Dr. Cohen. Continues on Herceptin and Perjeta every 3 weeks. No new body imaging since September 2022, which was stable. Feeling well overall no headaches, no nausea, no focal weakness, no confusion.  3/9/2023 She presents for follow up. At last visit, repeat MRI ordered 2 months from last MRI for concerning increase in size of the right caudate lesion.  2/25/23 Brain MRI IMPRESSION: No cystic change compared with 12/21/2022. 1. Stable bilateral supratentorial enhancing nodules, as described in detail above, compatible with the provided history of metastatic disease. 2. No new lesions visualized.  3/2/2023 CT C/A/P showed stable exam  Today she reports headaches relived with Tylenol or Advil. Today she reported pain to left leg while walking into the building(likely claudication) encouraged to follow up with PCP.  Visit dated 9/19/2023 patient returns for f/u with images for review. Reports intermittent HAs, occasional "I feel I would pass out and double vision also a spinning sensation of the room" about three weeks ago. Today she is w/o those symptoms. Continues to follow with Dr. Cohen on Herceptin/Perjeta/ Anastrozole Xgeva next dose 1/2024  MRI brain ww/o contrast 9/8/2023 IMPRESSION: No hydrocephalus or new enhancing lesions. 5 parenchymal lesions are identified which appear similar to the prior exam of 2/25/2023. Right caudate head lesion is slightly larger measuring 4.8 mm compared with the prior of 3.2 mm.  CT C/A/P 9/1/82023 IMPRESSION: Stable examination as compared to CT 3/2/2023. Sclerotic osseous lesions, without significant change.  Visit dated 11/29/2023   Patient returns for post treatment f/u and progress check after completion of GKRS Right caudate lesion 9/25/2023 2000cgy over 1 Fx. Reports doing well post treatment. Endorses intermittent HAs Denies N/V, unilateral extremity weakness/memory changes/gait disturbance/bowel/bladder dysfunction or other neurologic symptoms. No issues with speech or comprehension. Persistent knee pain for which she gets injections and is currently in PT  Continues to follow with Dr. Cohen on Herceptin/Perjeta every 3 weeks.   Visit dated 1/10/2024   Patient returns for routine follow up to include progress check and review of completed cranial images. Denies N/V, HA/unilateral extremity weakness/memory changes/gait disturbance/bowel/bladder dysfunction or other neurologic symptoms. No issues with speech or comprehension. SHe is w/o any new verbalized concerns at today's visit.  Continues to follow with Dr. Cohen on Herceptin /Perjeta every 3 weeks since 8/2021 MRI brain w w/o contrast 1/8/2024 - stable on my review, pending read  VISIT DATED: 9/17/2024 Patient returns for routine f/u and progress check with cranial images for review. Reports doing well from a neurological standpoint. Does note bilateral leg edema per patient workup thus far negative. Plans to see vascular on 9/20/2024 for further workup. Leg edema does result in difficulty with extended.  Continues to follow with Dr. Cohen on Kanjinti/Perjeta every 3 weeks since 8/2021.  Also, on Anastrozole Last CT C/A/P w w/o contrast 6/20/2024 IMPRESSION: Stable examination. Sclerotic osseous lesions, unchanged.  MRI brain w w/o contrast 9/16/2024 no final read available at this time  VISIT DATED: 12/18/2024 Patient presents for routine f/u and progress check with cranial images for review. Reports she continues to do well. Does note neuropathy of the hands/ feet. Uses a cane for safety. Intermittent headaches. She is w/o any new concerns today. Continues to follow with Dr. Cohen on Herceptin /Perjeta every 3 weeks since 8/2021 with recent CT C/A/P 12/7/2024 w/o any new findings.  MRI brain w w/o contrast 12/16/2024 IMPRESSION: Multiple parenchymal lesions compatible with metastatic disease. Anterior left insular lesion (16-80) appears stable measuring 6 x 3 mm previously 6 x 3 mm. Punctate anterior right frontal cortical lesion (16-30) in retrospect appears stable measuring 2 mm previously 2 mm. Punctate medial right occipital lesion (16-90) appears stable measuring 2 mm. Linear left temporal lesion (16-71) appears stable measuring 12 x 3 mm. Previously described punctate left frontal subcortical lesion no longer seen. No new lesions identifi

## 2024-12-20 NOTE — DISEASE MANAGEMENT
[Clinical] : TNM Stage: c [IV] : IV [FreeTextEntry4] : breast cancer [TTNM] : x [NTNM] : x [MTNM] : 1

## 2024-12-20 NOTE — HISTORY OF PRESENT ILLNESS
[FreeTextEntry1] :  Ms. Cardozo presents with a HER-2 positive breast cancer diagnosed in 2017 with multiple brain metastasis s/p gamma knife in 5/2017 to a right frontal, right parietal, left insular, left parietal, left parietotemporal, and right medial temporal areas. She completed additional gamma knife radiation to a right temporal, left parietal, right cerebella, and right frontal metastasis 12/18/18.  She additionally completed gamma knife to a right parietal and right frontal met on 12/3/2020.  Ms. Cardozo completed SRS on the LINAC for a total of 2000 cgy to the left cerebellum on 1/25/2022. GKRS Right caudate lesion 9/25/2023 2000cgy over 1 Fx.  Oncologic history: She initially presented with aphasia to the [facility] ER in late April 2017. CT head demonstrated a left temporofrontal lesion and MRI demonstrated multiple enhancing lesions with surrounding vasogenic edema without leptomeningeal enhancement. She underwent a CT Chest, Abdomen, and Pelvis demonstrating right axillary and pectoral lymphadenopathy, an irregular mass in the right breast, a 1.5 cm celiac axis lymph node and an omental implant suspicious for peritoneal carcinomatosis. She underwent right axillary ultrasound guided biopsy demonstrating ER+/LA negative/Her2 positive breast cancer. She underwent gamma knife SRS to multiple brain metastasis on 5/30/17 and did well. She then went on THP chemotherapy in July 2017, transitioned to HP chemotherapy, and  anastrozole as well.  PET/CT on 3/17/18 demonstrated increased FDG activity in the right breast with SUV 3.2, previously 2.7 with FDG-avid skin thickening overlying the breast. Otherwise it demonstrated a non-specific sclerotic lesion in the body of T8 and a lucent lesion in the body of T5 without increased FDG activity. MRI brain demonstrated a new tiny focus of abnormal enhancement in the right cerebellum measuring 0.2 mm with otherwise stable/decreasing size/enhancement of metastases. She was not a candidate for IA herceptin. She saw Dr. Phipps in April who recommended expectant management. She is now on arimidex + herceptin, perjeta.   7/19/19 Repeat MRI in June 2018 showed overall stable or diminished lesion but demonstrated one area of abnormal enhancement v artifact seen in left parietal region. PET/CT in June 2018 was stable with no new disease.  Today, patient reports no HA, vision changes, or dizziness. She reports new left leg numbness and pins/needle sensation on the anterior surface of her legs and pain radiation down from her back to her toes. She has a history of sciatica but the numbness is new. Ms. Cardozo denies urinary retention or lower extremity weakness. She is scheduled for MR of the spine on Sunday. In addition, she also reports problem with sleeping as well as diffuse aches in her shoulders, arms, and legs  12/5/18- Ms. Cardozo presents today for follow up . Since she saw us last she has continued to follow with Dr. Shaunna Cohen. Has continued on arimidex.  MRI brain done 11/30/18 showed multiple new brain mets worrisome for metastatic disease. Today she notes she has headaches, 2-3/10/ in varying spots on her head. These have been long standing, even before treatment. Denies focal weakness. Notes numbness to the right leg for the past week. Denies dizziness, trouble walking. Has lower longstanding, currently having injections to her back every two weeks. CT CAP done 11/19/18 showed sclerotic lesions in several vertebral bodies and the left hemisacrum, suspicious for metastatic disease,stable since 10/6/18  3/6/19- Ms. Cardozo presents today for follow up. She underwent gamma knife on 12/18/18 to four focuses of disease, including right temporal left parietal, right cerebellar, and right frontal. She continues on arimidex and xgeva. Today she notes on and off headaches, which are stable for a long time, relieved by tylenol. She notes a new development of numbness to her lips, with a feeling that something is walking on her face. She denies focal weakness, confusion, seizures, dizziness, trouble with vision.  3/26/19- Ms. Cardozo presents today for follow up. She underwent a brain MRI on 3/19/19 which showed previously noted tiny areas of abnormal enhancement in the posterior fossa tentorial region are no longer seen which is likely compatible with response to therapy. Clinical correlation and continued close follow up is recommended.  CT CAP showed stable osseous mets. Continues on xgeva and anastrazole.  Today she still has headaches that come and go and are unchanged. She still has intermittent numbness to her face. She denies confusion, dizziness, visual difficulties, trouble swallowing, trouble hearing. She has intermittent numbness to the right toes.  She recently completed antibiotics for bronchitis, still with some residual cough.   7/3/19- Ms. Cardozo presents today for follow up. She has continued to follow with Dr. Shaunna Cohen. She continues on arimidex, herceptin, and perjeta. Due for next body scans in 8/2019.  MRI 7/2/19 showed In comparison with 3/19/2019, no significant interval change, previous noted enhancing foci are again noted longer well appreciated. There is redemonstration of foci of hyperintense T2 and FLAIR signal within the right frontal, left subinsular and temporal lobes without interval change from prior. There is no new large focal abnormal areas of enhancement, restricted diffusion, hemorrhage or midline shift.   Today she feels well. Still has very slight intermittent headaches, not bothersome. Facial numbness is improved. Denies dizziness, trouble with balance, trouble with vision, nausea, vomiting.   11/19/2020- Ms. Cardozo presents today for follow up. She is seen today through TELEPHONE for which she provides verbal consent on 11/19/2020 at 2:39 PM. She has continued to follow with Dr. Cohen.  She is on armimidex, herceptin, and perjeta.  MRI brain in 2/2020 was stable, however her brain MRI 11/3/2020 showed: -Area of abnormal T1 and T2 prolongation with associated enhancement is again seen involving the left posterior temporal cortex. This finding continues to increase when compared with the prior study and currently measures approximately 0.7 x 1.1 cm. This finding is worrisome for an underlying lesion such as metastasis given patient's history.  -Small focus of abnormal enhancement involving the high left frontal cortex (series 9 image 132). This finding measures approximately 0.6 cm and previously measured approximately 0.2 cm. -Abnormal enhancement involving the right posterior temporal/inferior parietal cortex is again seen. This finding measures approximately 0.9 cm and previously measured approximately 0.6 cm. -Tiny area of abnormal enhancement is seen involving the left frontal subcortical region. This best seen on series 10 image 17 and measures approximately 0.2 cm. -Tiny focus of enhancement involving the high left frontal cortex (series 9 image 131). This finding measures approximately 0.5 cm.  Most recent body imaging 11/3/2020 showed a stable appearance of the chest, abdomen, and pelvis.   Today she feels well. denies headaches. She notes some pain to her lower back, which is longstanding, though has gotten worse in recent months. sometimes with numbness in the legs.  Denies nausea, vomiting, focal weakness.   2/17/2021- Ms. Cardozo presents today for follow up. She underwent a brain MRI  2/17/2021. This revealed Compared to the previous studies, the enhancing lesion in the right parietal lobe has resolved.  The lesions in the left anterior insula, left temporal lobe and left inferior cerebellum remain stable in size. Right occipital FLAIR hyperintensity without appreciable enhancement has also remained stable.  Continues following with Dr. Cohen. Remains on anastrazole, herceptin, and perjeta.  Today she denies headaches, nausea, vomiting. Has some intermittent numbness to the right face which is stable. She has back pain to the lower back and bilateral upper and lower extremities. This pain is stable.  5/25/2021- Ms. Cardozo presents today follow up. She continues to follow with Dr. Cohen and continues on Herceptin, Perjeta, and Arimidex.  Today she notes left eye pain, and feels her right eye is bulging. She has left fontal intermittent headaches that do not require medications. Chronic bilateral LE pain. She has numbness to her face.   MRI brain 5/20/2021 showed Abnormal lesions in the posterior fossa and supratentorial region. Some of these lesions have increased in size which is worrisome for progression of patient's underlying disease process. Clinical correlation continued close interval follow-up is recommended.  9/21/2021- Ms. Cardozo presents today for follow up. MRI brain done 8/23/2021 showed decreased size and enhancement of left inferior cerebellar lesion. Grossly stable bilateral temporal and left insular lesions. No new lesions.  Last body imaging CT CAP 5/29/2021 showed Stable findings. Osseous metastatic disease is without change in the chest and abdomen. Continues to follow with Dr. Cohen. Xeloda tucatinib herceptin started 6/15/2021 due to POD in brain however was discontinued in 8/2021 due to fatigue, diarrhea, SILAS, LE swelling, and pain. She is back on anastrazole herceptin perjeta.   Today she is feeling ok. She notes some headaches from time to time which have been stable over a long time. Stable facial numbness. no new nausea, vomiting, weakness. remains bothered by fluid retention which started after taking xeloda/tucatinib.  12/22/2021: Patient presents today for follow-up. She discontinued tucatinib/xeloda due to poor tolerability.  She presents with a new MRI.   MRI Head w/wo IV contrast (12/18/2021) shows 5 SMALL LESIONS AS DESCRIBED, 2 OF WHICH ARE STABLE, 2 WHICH HAVE MILDLY INCREASED IN SIZE, AND ONE WHICH APPEARS NEW. FINDINGS SUGGEST MILD INTERVAL PROGRESSION OF PATIENT'S KNOWN METASTATIC DISEASE. RECOMMEND CLINICAL CORRELATION AND INTERVAL FOLLOW-UP. Today denies denies complaints.   1/19/2022: Pt presents for follow-up. MRI 1/19/22 shows further slight progression of the cerebellar lesion while other lesions remain stable.    3/16/2022: Pt presents for PTE. Completed Linac SRS 2000 cgy/1fx to L cerebellum on 1/25/22. Reports feeling well overall. Having occasional HA (3/10, no medication). Notes mild pain and weakness in L shoulder, but denies general muscle weakness. Continues to have numbness in hands following secondary to chemo. Currently receiving Herceptin, Perjeta, and Anastrazole with Dr. Cohen.  4/14/2022- Ms. Cardozo presents today for PTE. Her MRI was done on 4/13, there is no final MRI but appears to be a good response to the left cerebellar lesion. Continues on kanjinti and perjeta.  CT CAP 3/26/2022 showed Stable examination. Sclerotic densities in bone which have not significantly changed.  Today she feels well. notes slight intermittent headaches, unchanged recently. no new focal weakness. notes a bald spot to her posterior head  7/13/2022 - Ms. Cardozo presents today for follow up.  Continues to follow with Dr. Cohen. Continues on herceptin and xgeva.  Brain MRI done 7/10/2022.  Body imaging 3/2022 stable.  10/26/2022- Ms. Cardozo presents today for follow up.  CT CAP 9/7/2022 showed Stable examination. Sclerotic densities in bone which have not significantly changed.  MRI brain 10/21/2022 showed New punctate focus of enhancement within the right caudate nucleus. Enhancing left insular nodule appears minimally larger measuring 6 x 5 mm, previously measured 5 x 4 mm. Slight increased enhancement associated with the anterior right frontal FLAIR signal abnormality.  Enhancing nodule in the left temporal region appears essentially stable. New linear focus of enhancement just medial to the lesion may be vascular in nature. Stable signal abnormality with vague enhancement anteromedial aspect of the right temporal lobe.  Findings suggest mild disease progression. Recommend clinical correlation and close interval follow-up.  continues on herceptin/perjeta.  Feeling well today. no headaches, no nausea, no focal weakness or confusion or dizziness.  1/25/2023- Ms. Cardozo presents today for follow up. Seen through TELEPHONE for which she consents on 1/25/2023 at 3:10 PM. MRI brain done 12/21/2022 showed Mild interval increase in size of the small focus of enhancement within the right caudate body currently measuring 3 mm, previously 2 mm. Other areas of signal abnormality and enhancement are stable. Please see report for description.  No new lesions seen.  Continues to follow with Dr. Cohen. Continues on Herceptin and Perjeta every 3 weeks. No new body imaging since September 2022, which was stable. Feeling well overall no headaches, no nausea, no focal weakness, no confusion.  3/9/2023 She presents for follow up. At last visit, repeat MRI ordered 2 months from last MRI for concerning increase in size of the right caudate lesion.  2/25/23 Brain MRI IMPRESSION: No cystic change compared with 12/21/2022. 1. Stable bilateral supratentorial enhancing nodules, as described in detail above, compatible with the provided history of metastatic disease. 2. No new lesions visualized.  3/2/2023 CT C/A/P showed stable exam  Today she reports headaches relived with Tylenol or Advil. Today she reported pain to left leg while walking into the building(likely claudication) encouraged to follow up with PCP.  Visit dated 9/19/2023 patient returns for f/u with images for review. Reports intermittent HAs, occasional "I feel I would pass out and double vision also a spinning sensation of the room" about three weeks ago. Today she is w/o those symptoms. Continues to follow with Dr. Cohen on Herceptin/Perjeta/ Anastrozole Xgeva next dose 1/2024  MRI brain ww/o contrast 9/8/2023 IMPRESSION: No hydrocephalus or new enhancing lesions. 5 parenchymal lesions are identified which appear similar to the prior exam of 2/25/2023. Right caudate head lesion is slightly larger measuring 4.8 mm compared with the prior of 3.2 mm.  CT C/A/P 9/1/82023 IMPRESSION: Stable examination as compared to CT 3/2/2023. Sclerotic osseous lesions, without significant change.  Visit dated 11/29/2023   Patient returns for post treatment f/u and progress check after completion of GKRS Right caudate lesion 9/25/2023 2000cgy over 1 Fx. Reports doing well post treatment. Endorses intermittent HAs Denies N/V, unilateral extremity weakness/memory changes/gait disturbance/bowel/bladder dysfunction or other neurologic symptoms. No issues with speech or comprehension. Persistent knee pain for which she gets injections and is currently in PT  Continues to follow with Dr. Cohen on Herceptin/Perjeta every 3 weeks.   Visit dated 1/10/2024   Patient returns for routine follow up to include progress check and review of completed cranial images. Denies N/V, HA/unilateral extremity weakness/memory changes/gait disturbance/bowel/bladder dysfunction or other neurologic symptoms. No issues with speech or comprehension. SHe is w/o any new verbalized concerns at today's visit.  Continues to follow with Dr. Cohen on Herceptin /Perjeta every 3 weeks since 8/2021 MRI brain w w/o contrast 1/8/2024 - stable on my review, pending read  VISIT DATED: 9/17/2024 Patient returns for routine f/u and progress check with cranial images for review. Reports doing well from a neurological standpoint. Does note bilateral leg edema per patient workup thus far negative. Plans to see vascular on 9/20/2024 for further workup. Leg edema does result in difficulty with extended.  Continues to follow with Dr. Cohen on Kanjinti/Perjeta every 3 weeks since 8/2021.  Also, on Anastrozole Last CT C/A/P w w/o contrast 6/20/2024 IMPRESSION: Stable examination. Sclerotic osseous lesions, unchanged.  MRI brain w w/o contrast 9/16/2024 no final read available at this time  VISIT DATED: 12/18/2024 Patient presents for routine f/u and progress check with cranial images for review. Reports she continues to do well. Does note neuropathy of the hands/ feet. Uses a cane for safety. Intermittent headaches. She is w/o any new concerns today. Continues to follow with Dr. Cohen on Herceptin /Perjeta every 3 weeks since 8/2021 with recent CT C/A/P 12/7/2024 w/o any new findings.  MRI brain w w/o contrast 12/16/2024 IMPRESSION: Multiple parenchymal lesions compatible with metastatic disease. Anterior left insular lesion (16-80) appears stable measuring 6 x 3 mm previously 6 x 3 mm. Punctate anterior right frontal cortical lesion (16-30) in retrospect appears stable measuring 2 mm previously 2 mm. Punctate medial right occipital lesion (16-90) appears stable measuring 2 mm. Linear left temporal lesion (16-71) appears stable measuring 12 x 3 mm. Previously described punctate left frontal subcortical lesion no longer seen. No new lesions identifi

## 2024-12-20 NOTE — REVIEW OF SYSTEMS
[Muscle Pain] : muscle pain [Muscle Weakness] : muscle weakness [Dizziness] : dizziness [Negative] : Genitourinary [Cognitive Disturbance: Grade 0] : Cognitive Disturbance: Grade 0 [Concentration Impairment: Grade 0] : Concentration Impairment: Grade 0 [Dizziness: Grade 0] : Dizziness: Grade 0  [Facial Muscle Weakness: Grade 0] : Facial Muscle Weakness: Grade 0 [Headache: Grade 1 - Mild pain] : Headache: Grade 1 - Mild pain [Lethargy: Grade 0] : Lethargy: Grade 0 [Meningismus: Grade 0] : Meningismus: Grade 0 [Peripheral Motor Neuropathy: Grade 0] : Peripheral Motor Neuropathy: Grade 0 [Peripheral Sensory Neuropathy: Grade 1 - Asymptomatic; loss of deep tendon reflexes or paresthesia] : Peripheral Sensory Neuropathy: Grade 1 - Asymptomatic; loss of deep tendon reflexes or paresthesia [Somnolence: Grade 0] : Somnolence: Grade 0 [Confused] : no confusion [FreeTextEntry9] : has pain "all over the place." uses cane for safety LEFT knee pain impairs walking [de-identified] : Intermittent HAs, + neuropathy hands/feet

## 2024-12-20 NOTE — REVIEW OF SYSTEMS
[Muscle Pain] : muscle pain [Muscle Weakness] : muscle weakness [Dizziness] : dizziness [Negative] : Genitourinary [Cognitive Disturbance: Grade 0] : Cognitive Disturbance: Grade 0 [Concentration Impairment: Grade 0] : Concentration Impairment: Grade 0 [Dizziness: Grade 0] : Dizziness: Grade 0  [Facial Muscle Weakness: Grade 0] : Facial Muscle Weakness: Grade 0 [Headache: Grade 1 - Mild pain] : Headache: Grade 1 - Mild pain [Lethargy: Grade 0] : Lethargy: Grade 0 [Meningismus: Grade 0] : Meningismus: Grade 0 [Peripheral Motor Neuropathy: Grade 0] : Peripheral Motor Neuropathy: Grade 0 [Peripheral Sensory Neuropathy: Grade 1 - Asymptomatic; loss of deep tendon reflexes or paresthesia] : Peripheral Sensory Neuropathy: Grade 1 - Asymptomatic; loss of deep tendon reflexes or paresthesia [Somnolence: Grade 0] : Somnolence: Grade 0 [Confused] : no confusion [FreeTextEntry9] : has pain "all over the place." uses cane for safety LEFT knee pain impairs walking [de-identified] : Intermittent HAs, + neuropathy hands/feet

## 2025-01-13 NOTE — REASON FOR VISIT
[Friend] : friend [FreeTextEntry3] : Dr. Cohen [FreeTextEntry1] : ------------------------------------------------------------------------ MONSERRAT KENT is a 74 year old woman with a history of ER+/HER2 positive breast cancer, hypertension, type 2 diabetes, seen for follow up of lower extremity edema and elevated blood pressure.  Prior Cancer Treatments: ------------------------------------------------------------------------ Chemo/targeted therapy: 7/10/2017: THP --> HP 1/2018: anastrozole 6/2021-8/2021: tucatinib/capecitabine 8/2021- present: Sesar + pertuzumab ------------------------------------------------------------------------ Surgery: ------------------------------------------------------------------------ Radiation: 5/30/2017-2022: gamma knife for multiple brain metastases

## 2025-01-13 NOTE — ASSESSMENT
[FreeTextEntry1] : --------------------------------------------------------- 74 year old woman with metastatic HER2+ breast cancer diagnosed in 2017, longstanding type 2 diabetes and hypertension who is seen for follow up with complaints of ongoing bilateral lower extremity edema.  There is diastolic dysfunction by echo, but pro-BNPs normal.  # Lower extremity edema: weight stable. Chest clear. Suspect due to diabetic nephropathy and venous insufficiency rather than heart failure, but the patient did note some improvement with trial of loop diuretic. - advised to follow up with Vascular to be measured for compression and for pneumatic pump - repeat pro-BNP and CMP today - start spironolactone 25 mg daily + HCTZ 25 mg daily for HFpEF and HTN - pending CMP, can continue furosemide as well - add Jardiance 10 mg daily for diastolic dysfunction, CKD, and uncontrolled type 2 diabetes  # Hypertension with labile readings at clinic visits: BP elevated today - continue losartan 100 mg daily - continue HCTZ 25 mg daily (with spironolactone 25 mg daily added) - changed metoprolol to carvedilol 3.125 BID - continue this dose for now - continue BP monitoring  # History of metastatic breast cancer with ongoing targeted therapy - continue surveillance echo with GLS during HER2 targeted therapy. - reasonable to space out to every 6 months - last echo, November 2024 - normal LVEF  # Type 2 diabetes, uncontrolled - add Jardiance 10 mg daily for HFpEF, CKD - cont metformin 500 ER (higher dose caused diarrhea per patient) - also on glipizide and pioglitazone - discussed potential for hypoglycemia with SGLT2; she monitors her BG and will hold glipizide if low BG - needs follow up with PCP  Follow up in 3 months with me  Above recommendations were discussed with the patient and all questions answered to the best of my ability and to her apparent satisfaction.

## 2025-01-13 NOTE — PHYSICAL EXAM
[Well Developed] : well developed [Well Nourished] : well nourished [No Acute Distress] : no acute distress [Normal Venous Pressure] : normal venous pressure [No Murmur] : no murmur [No Gallop] : no gallop [Clear Lung Fields] : clear lung fields [No Respiratory Distress] : no respiratory distress  [Normal Gait] : normal gait [Venous stasis] : venous stasis [Normal Speech] : normal speech [Alert and Oriented] : alert and oriented [Normal S1, S2] : normal S1, S2 [Good Air Entry] : good air entry [Venous varicosities] : venous varicosities [de-identified] : irregular rhythm  [de-identified] : degenerative joint deformity of metatarsals  [de-identified] : 1+ bilateral pitting edema

## 2025-01-13 NOTE — HISTORY OF PRESENT ILLNESS
[FreeTextEntry1] : Interval History: She continues to be affected by lower extremity edema. She was evaluated by Nephrology and referred to Vascular Medicine who recommended compression and prescribed a trial of furosemide. She is not using compression as she found it too tight. But states she will follow up. She was advised to stop HCTZ and start furosemide 20 mg daily in September. She reports taking two of the furosemide tablets helped the swelling a little. Echocardiogram from November showed normal LVEF and grade 2 diastolic dysfunction. Today, her blood pressure is elevated. She states she is out of her medications and requests refills.   History: The patient reports chronic bilateral leg swelling that has been ongoing for a long time and does not subside. Previously, the swelling would reduce by morning but now persists throughout the day. The leg swelling causes discomfort and heaviness.  She has no history of DVT. A duplex ultrasound examination of the lower extremity veins showed no evidence of DVT.   She has been treated with Kajinti and pertuzumab since  for metastatic breast cancer. The patient has been undergoing regular echocardiograms every three months during HER2 targeted therapy, with the most recent one in 2024 showing no significant abnormalities. Given increased edema and no recent echocardiogram, treatment was held pending cardiology evaluation. She reports not having chest pain, shortness of breath, or palpitations.  The patient also experiences elevated blood pressure readings during clinic visits, with readings sometimes reaching 180/187 mmHg. The patient reports taking medication for blood pressure, but fluctuations persist.   Past Medical History: - Hypertension - Diabetes mellitus type 2 - an SGLT-2 inhibitor was prescribed previously but copay cost was too high - Metastatic breast cancer (since ) - Hysterectomy - Knee repair  Medications: - Losartan 100 mg once daily - Metoprolol (type unspecified) once daily - Hydrochlorothiazide (HCTZ) 25 mg daily - Omeprazole  - Metformin 500 mg ER once daily (higher doses caused intolerable diarrhea)  - Glimepiride [dose unspecified] - Pioglitazone [dose unspecified] - atorvastatin 20 mg daily - insulin  Allergies: - No known drug allergies  Social History: - Lives with son and  near [facility] - Retired, previously worked in cleaning - Non-smoker, no ETOH  Cardiovascular Summary: ---------------------------------------------- EC2025: sinus 65 bpm w sinus arrhythmia, LVH w repolarization abnormality 2024: sinus bradycardia with PACs, LVH with repolarization abnormality 2024: sinus bradycardia 54 bpm, LVH ---------------------------------------------- Echo: 2024: EF 65 %, grade 2 diastolic dysfunction, LAE, GLS -20 (GE), concentric LVH, mild AS 2024: EF 68 %, GLS -17,2 (GE) 2024: EF 67 %, GLS -18.4, mod LAE, mild PHTN 23 LVEF 55% to 60% 4/10/2023 LVEF 61%  ---------------------------------------------- Stress: ---------------------------------------------- CT/MRI ---------------------------------------------- Remote/ambulatory rhythm monitoring:

## 2025-01-13 NOTE — REVIEW OF SYSTEMS
[FreeTextEntry2] : No fever, weight stable [FreeTextEntry5] : Reports elevated blood pressure during clinic visits, no chest pain [FreeTextEntry6] : No shortness of breath, no difficulties breathing at night [FreeTextEntry8] : No urinary symptoms reported [FreeTextEntry9] : Chronic leg swelling, heaviness in legs [de-identified] : history of brain radiation (gamma knife) for breast cancer

## 2025-02-12 NOTE — HISTORY OF PRESENT ILLNESS
[de-identified] : Ms. Cardozo is a 74-year-old lady who was recently diagnosed with HER-2 positive breast cancer with brain metastasis. She presented today for  an evaluation and to start chemotherapy. Her oncologic history is as follows:  She presented to  [facility] emergency room on 17 with complaints of aphasia for 2 weeks.  A CT head showed new lesion with mixed attenuation in the left temporofrontal region. MRI had done on 17 showed multiple enhancing parenchymal lesion at the gray-white junction with surrounding vasogenic edema suspicious for metastatic disease. No leptomeningeal enhancement was noted. She also underwent a CT scan of chest abdomen and pelvis on the same day which showed right axillary and pectoral lymphadenopathy, irregular mass in the right breast, 1.5 cm celiac axis lymph node and an omental implant  suspicious for peritoneal carcinomatosis. She was started on Keppra by neurology.  She underwent ultrasound-guided biopsy of the right axillary lymphadenopathy on 5/3/17which showed adenocarcinoma of mammary origin, positive for breast markers. ER 95%, IA negative and HER-2/douglas 3+  She completed gamma knife radiation treatment for multiple brain metastases on 17 by Dr. Savage. She denies headaches, balance issues, seizures, change in vision, hearing difficulty, memory problems, localized motor weakness  or comprehension problems.  She went for a second opinion [place] [facility] and they made the same recommendation (THP)  She was seen in 2019 with c/o worsening cough, wheezing and HOLLAND. Chest CT didn't show pna or POD. She is using inhalers now and is feeling much better. Cough and wheezing resolved.  CT 2020- ? liver lesion, MRI recommended. 2/15/2020 MRI ABD Impression: No focal liver lesions are visualized   Patient complaint of worsening generalized body pain 2021.  CT scan 2020 showed stable disease.  She was sent for a PET/CT to evaluate bony disease. PET/CT 2021 images reviewed: She has mild increase in FDG activity in the thoracolumbar spine.  MRI 3/2021 showed arthritis, spinal stenosis and stable bone mets.  Since there is no clear evidence of progression, we will continue with Herceptin and Perjeta.  She will continue anastrozole.  She reports overall body pain has improved without intervention.  She was also given 2 weeks anastrozole break which did not help. Pain has since resolved. She doesn't take pain meds daily. Tylenol once or twice a week. Discussed ortho f/u and PT prn   2021 She has been doing very well with the current regimen.  She underwent a brain MRI 2021 which showed progression of CNS mets and possibility of leptomeningeal disease.  Patient is asymptomatic.  I sent her for CT chest abdomen pelvis and discussed results with her today.  CT scans essentially do not show any evidence of disease progression outside of CNS. Dr. Lundberg discussed the case with me and concern is that she has significant burden of CNS disease which is currently not being controlled with localized radiation treatments.  She received gamma knife to multiple areas in May 2017 and 2018.  Current progression is in some of the treated mets but some of them are new as well.  I discussed MRI brain and CT chest abdomen pelvis findings with the patient as well as her son on the phone.  I recommend to switch therapy to cover CNS disease as well. We reviewed that she has been treated with standard of care first-line regimen based on HUBERT study.  We reviewed results from HER 2 CLIMB study which showed benefit of adding tucatinib to Herceptin and Xeloda.  Study showed improvement in overall survival as well as progression free survival especially in patients with brain metastases.  Side effect of the regimen reviewed with the patient and son.  Patient has multiple medical comorbidities and is as such taking multiple oral medications.  She was very concerned about adding multiple pills to her daily regimen.  Her son agreed to maintain a pillbox and supervise the pill intake.    She will take:  Capecitabine 1500 mg twice daily 1 week on 1 week off. Tucatinib 300 mg twice a day is the recommended dose but given the concern about significant GI toxicity, I recommended she start with dose reduction.  She will take 1 pill twice daily.  She will also take Imodium 4 times a day and report if she has more than 3-4 bowel movements a day.  Use of antinausea, antidiarrheal and prophylactic use of udder cream to prevent hand-foot syndrome was reviewed with the patient as well. She will continue Herceptin every 3 weeks.  She will stop Perjeta and anastrozole.  Addendum: Pharmacy called me on 2021 and reported that patient did not want to start the regimen.  I presented the case in tumor board on 2021 and consensus opinion was to recommend change in treatment and to start XTH to treat CNS disease.  I discussed that with the patient and explained her to start.  We reviewed side effect profile again.  She will start with the lower dose and see me in 1 week.  Depending on tolerability, we will uptitrate to full dose.  She started treatment 6/15/2021. She was extremely concerned about side effects and multiple pills therefore she is started on a lower dose.  21: she reports no significant toxicity. She had mild fatigue but no nausea vomiting diarrhea mouth sores or hand-foot syndrome. She was taking Imodium as prescribed and reports mild constipation. This is her off week for Xeloda. I recommend to increase tucatinib dose 2 pills twice daily(full dose). I will see her next week. If she has no concerning side effects, we will increase Xeloda dose as well. Last week she took Xeloda 2 and 2 pills (1000 bid) due to concerns about toxicity. She is due for Herceptin next week. We will do blood work next week and titrate up the dose as tolerated.   21: She iS here with her  today. Getting Herceptin today. She is on full dose of tucatinib 2 pills twice daily. Reports that constipation is resolved. She had 3 episodes of semisolid bowel movements, improved with Imodium. She denies nausea or vomiting. No other concerning symptoms. I recommend to start Xeloda and increase the dose to 3 pills twice daily 1 week on 1 week off. She will call if any concerning symptoms specifically diarrhea. Change in dose was discussed with the patient and . Written instructions were provided as well. Appointment for 3 weeks Herceptin and echocardiogram was discussed. Addendum: Blood work from 2021 showed rising creatinine to 1.93. She has diabetes and hypertension. Patient did not report severe diarrhea but this rising creatinine is concerning. I recommend that she comes for IV fluid this week and we will repeat CMP. She will hold Xeloda in the meantime. hold and Iv luids.   21 Saw patient in tx room today she is here for IV fluids #3 this as she has ongoing loose stools and decreased appetite x 1 week. On 7/3/21 she was also noted to have hypocalcemia and elevated creatinine. Xeloda was stopped on 21 and patient was instructed to decrease Tucatinib to 1 pill BID. Since decreasing Tucatinib she has about 4 diarrhea episodes daily. Instructed to continue Imodium prn. She reports  cough productive of scant amount of yellow mucous x 4 days no fever mild SOB when going up the stairs but this is not new. No SOB with regular activity.    2021 Today she reports that she is compliant with pills.  She reports severe fatigue,  unable to do household chores, lower extremity swelling, loose watery diarrhea 2-3 times a day, maximum 4 times, taking Imodium 2-3 times a day.  She has nausea even with water, unable to eat much, poor taste.  She is taking antinausea without much relief.  GERD is severe since PPI was held (interaction with Xeloda), not much relieved with Pepcid. Patient was crying today and said she cannot do it anymore.  I recommend to stop Xeloda and tucatinib for now.  She can restart PPI.  She will be treated with Herceptin today.  We will draw routine labs.  Stat BMP showed that creatinine is stable. 3-month brain MRI follow-up is coming up soon.  We will reevaluate after brain MRI.  Options include either switching to Herceptin Perjeta and radiating the brain lesions or consider Enhertu as it does have some CNS penetration. We will hold off CT imaging for now due to slight bump in creatinine.  Echo ordered today   5/3/2023  Patient seen in tx room today s/p syncopal episode/fall 2022 2/2 hypoglycemia. She reports feeling dizzy prior to episode. She was seen in ER CT head 4/10/23: stable No evidence of acute cortical infarction or hemorrhage. No dizziness headache or other neuro symptoms presently. D/w Dr. Noel RICO to proceed with Kanjinti/perjeta today. Reports knee pain and will f/u PCP 23 ECHO 4/10/23 LVEF 61% No CP or SOB Patient had imaging done on 3/2023. Upon review of CT films, my interpretation is that patient has STABLE disease. I reviewed these findings with the patient   2024 Here for Kanjinti perjeta today No cp sob, no GI sx with HP  She is on Xgeva q 6 month and is noted to have mild hypocalcemia. Ca 8.3  Rec to continue Ca suppls 600 mg BID while on Xgeva  Will repeat CMP today.. BP high frequently, WNL today. Rec to see cardioonc. BS better now MRI 10/2022, 2023 stable. Fb Dr Lundberg. MRI 2023 showed POD, s/p SRS 2023, repeat MRI 2024 SD CT 2023 SD , 2024 SD She is on xgeva q6 m as she has been on it for > 4 yrs. Last dental exam 2021. Last Xgeva  23, 24. Next due 2024 ECHO Q3M 23 LVEF 55% to 60%, 10/2023, ECHo due 2024 She reports family issues, her  has worsening Parkinson's disease and 2 falls. son had hernia surgery  24: Patient is here today for Kanjinti/Perjecta- Q3 weeks.  Patient denies any SOB or C/N/V/D. She reports eating well, with no changes in appetite and no weight change.  Patient is on XGEVA, Q 6 months. Next dose due: 24. Patient denies any dental issues.  Patient reports significant pitting edema in her bilateral extremities x 2 months. Denies that it improves with elevation and can be painful. Office to r/o DVT. Bilateral Venous Duplex ordered, ASAP. Referral to Nephrology and Cardiology (appt: ) also placed. Ms. Cardozo reports she discussed this concern with her PCP at the last visit who instructed her to take 2 Hydrochlorothiazide pills daily to see if s/s resolved. Patient reports s/s have remained consistent. Creatinine has progressively increased over the last year.  Ms. Cardozo underwent her last ECHO on 24, next imaging: OVERDUE. Orders placed for ASAP Imaging. Results showin. Left ventricular cavity is normal. Left ventricular wall thickness is mildly increased. Left ventricular systolic function is normal with an ejection fraction of 67 % by Braden's method of disks. Last CT C/A/P: 14; Stable disease Ms. Cardozo reports her son has successfully healed from his hernia s/x . Unfortunately, her  continue to decline and will be entering a Rehabilitation Facility in [place]. Patient also request a letter to be written on behalf of her sister-in-law coming from [place] to be a caregiver to her spouse as she is unable to care for him given her treatment schedule and general overall health.  Treatment held 2/2 overdue ECHO and new s/s of b/l LE swelling (2+pitting) RTC:  2 months with treatment; patient to obtain ECHO and Cardiology clearance prior to next infusion. CT before next visit  [FreeTextEntry1] : Started THP on 7/10/17, on HP now\par  Anastrazole 1/2018\par  XTH 6/2021- 8/2021\par  HP 8/2021 [de-identified] : Ms. MONSERRAT KENT is here for follow up visit for metastatic breast cancer. She completed THP 7/2017, HP and anastrazole. Progression of disease in the brain, started Xeloda Herceptin and tucatinib 6/2021. MRI 8/2021 showed response but pt could not tolerate XHT regimen despite dose reduction. HP restarted 8/2021 2/2025 Patient had imaging done on 12/2024 . Upon review of CT films, my interpretation is that patient has STABLE disease. I reviewed these findings with the patient Patient is here today for Kanjinti/Perjecta- Q3 weeks.  Patient denies any SOB or C/N/V/D. She reports eating well, with no changes in appetite and no weight change.  Patient is on XGEVA, Q 6 months.  Patient denies any dental issues.  Next DUE july 2025 Last ECHO: Ordered Patient established care with Dr. Holman on 24 June 2024. BP high, rec to f/u with him  Patient established care with Dr. Suárez (Nephrology) for CKD. Cr stable  Otherwise feeling well.  F/b Dr Lundberg for brain MRI and brain mets  [Patient Refusal] : Patient refused psychosocial distress assessment [ECOG Performance Status: 1 - Restricted in physically strenuous activity but ambulatory and able to carry out work of a light or sedentary nature] : Performance Status: 1 - Restricted in physically strenuous activity but ambulatory and able to carry out work of a light or sedentary nature, e.g., light house work, office work [IV] : IV

## 2025-02-12 NOTE — PHYSICAL EXAM
[Restricted in physically strenuous activity but ambulatory and able to carry out work of a light or sedentary nature] : Status 1- Restricted in physically strenuous activity but ambulatory and able to carry out work of a light or sedentary nature, e.g., light house work, office work [Obese] : obese [Normal] : affect appropriate [de-identified] : port in left chest wall c/d/i , chronic ankle/pedal swelling noted  rt. >left.. Chronic stable B/L LE edema  [de-identified] : Right breast- right breast slightly bigger than left breast + skin thickening in the outer lower quadrant with vague fullness. cellulitis resolved. No LN palpable UNCHANGED [de-identified] : bilateral pitting edema+2

## 2025-02-12 NOTE — ASSESSMENT
[FreeTextEntry1] : This is a 74 year-old very pleasant  lady, with medical history significant for diabetes, dyslipidemia, hypertension and hypothyroidism, who is diagnosed with metastatic stage IV ER positive, OK negative, HER-2/douglas positive breast cancer. She presented with symptomatic brain metastasis and completed gamma knife radiosurgery to the brain metastases. She also has bone mets, lymph node metastasis and peritoneal metastasis. She started THP on 7/10/17. Excellent response to chemo clinically, inflammatory changes resolved, CT scans after 4 cycles showed excellent response except one new sclerotic focus on L3 which was radiated. PET 3/2018 showed good response,Brain MRI shows new small lesion. She is on arimidex + herceptin, perjeta. Progression of disease in the brain, started Xeloda Herceptin and tucatinib 6/2021  METASTATIC BREAST CANCER WITH BRAIN METASTASES: Progression of disease in the brain, started Xeloda Herceptin and tucatinib 6/2021. MRI 8/2021 showed response but pt could not tolerate XHT regimen despite dose reduction. HP restarted 8/2021. Patient is tolerating anastrozole Herceptin Perjeta.  2/2025 Patient had imaging done on 12/2024 . Upon review of CT films, my interpretation is that patient has STABLE disease. I reviewed these findings with the patient continue HP unitl POD Patient is here today for Kanjinti/Perjecta- Q3 weeks.  Patient denies any SOB or C/N/V/D. She reports eating well, with no changes in appetite and no weight change.  Continue current treatment.  Repeat scan in 4 to 6 months Continue anastrozole daily   - BRAIN METS MRI 9/2023 POD- s/p SRS MRI 1/2024 SD Continue f/u with Dr Lundberg reminded to [facility] appt with rad onc  - Bone mets- She is on Xgeva q6m. She reports she saw dental 4/2021 and exam was good. Xgeva q 6 month, noted to have mild hypocalcemia. Rec to continue Ca suppls 600 mg BID while on Xgeva Will repeat CMP today, next Xgeva July 2025 - Peritoneal mets- no ascites. Monitor for now - DM and HTN: We discussed increased risk of herceptin induced cardiomyopathy if BP and DM not controlled. Rec to f/u with cardio, endo, pcp and nephrology  Elevated Creatinine most likely 2/2 diabetes, followed by renal.  - Elevated tumor markers: stable with minor fluctuations noted. Trend q 2 months -Monitor for Herceptin induced cardiotoxicity: Check echo every 3 months. -Lower leg extremity edema. Has vascular appointment later this month.  - HTN: Referred to see cardio onc for htn management, LE Edema  Continue Kanjinti & Perjeta every 3 weeks XGEVA q 6 months next due July 2025  F/u ith rad onc  RTC 3 weeks  [Palliative] : Goals of care discussed with patient: Palliative

## 2025-03-12 NOTE — VITALS
[Maximal Pain Intensity: 0/10] : 0/10 [Least Pain Intensity: 0/10] : 0/10 [OTC] : OTC [Maximal Pain Intensity: 5/10] : 5/10 [Least Pain Intensity: 1/10] : 1/10 [Pain Location: ___] : Pain Location: [unfilled] [90: Able to carry normal activity; minor signs or symptoms of disease.] : 90: Able to carry normal activity; minor signs or symptoms of disease.

## 2025-03-13 NOTE — HISTORY OF PRESENT ILLNESS
[FreeTextEntry1] : Ms. Cardozo presents with a HER-2 positive breast cancer diagnosed in 2017 with multiple brain metastasis s/p gamma knife in 5/2017 to a right frontal, right parietal, left insular, left parietal, left parietotemporal, and right medial temporal areas. She completed additional gamma knife radiation to a right temporal, left parietal, right cerebella, and right frontal metastasis 12/18/18.  She additionally completed gamma knife to a right parietal and right frontal met on 12/3/2020.  Ms. Cardozo completed SRS on the LINAC for a total of 2000 cgy to the left cerebellum on 1/25/2022. GKRS Right caudate lesion 9/25/2023 2000cgy over 1 Fx.  Oncologic history: She initially presented with aphasia to the [facility] ER in late April 2017. CT head demonstrated a left temporofrontal lesion and MRI demonstrated multiple enhancing lesions with surrounding vasogenic edema without leptomeningeal enhancement. She underwent a CT Chest, Abdomen, and Pelvis demonstrating right axillary and pectoral lymphadenopathy, an irregular mass in the right breast, a 1.5 cm celiac axis lymph node and an omental implant suspicious for peritoneal carcinomatosis. She underwent right axillary ultrasound guided biopsy demonstrating ER+/MD negative/Her2 positive breast cancer. She underwent gamma knife SRS to multiple brain metastasis on 5/30/17 and did well. She then went on THP chemotherapy in July 2017, transitioned to HP chemotherapy, and  anastrozole as well.  PET/CT on 3/17/18 demonstrated increased FDG activity in the right breast with SUV 3.2, previously 2.7 with FDG-avid skin thickening overlying the breast. Otherwise it demonstrated a non-specific sclerotic lesion in the body of T8 and a lucent lesion in the body of T5 without increased FDG activity. MRI brain demonstrated a new tiny focus of abnormal enhancement in the right cerebellum measuring 0.2 mm with otherwise stable/decreasing size/enhancement of metastases. She was not a candidate for IA herceptin. She saw Dr. Phipps in April who recommended expectant management. She is now on arimidex + herceptin, perjeta.   7/19/19 Repeat MRI in June 2018 showed overall stable or diminished lesion but demonstrated one area of abnormal enhancement v artifact seen in left parietal region. PET/CT in June 2018 was stable with no new disease.  Today, patient reports no HA, vision changes, or dizziness. She reports new left leg numbness and pins/needle sensation on the anterior surface of her legs and pain radiation down from her back to her toes. She has a history of sciatica but the numbness is new. Ms. Cardozo denies urinary retention or lower extremity weakness. She is scheduled for MR of the spine on Sunday. In addition, she also reports problem with sleeping as well as diffuse aches in her shoulders, arms, and legs  12/5/18- Ms. Cardozo presents today for follow up . Since she saw us last she has continued to follow with Dr. Shaunna Cohen. Has continued on arimidex.  MRI brain done 11/30/18 showed multiple new brain mets worrisome for metastatic disease. Today she notes she has headaches, 2-3/10/ in varying spots on her head. These have been long standing, even before treatment. Denies focal weakness. Notes numbness to the right leg for the past week. Denies dizziness, trouble walking. Has lower longstanding, currently having injections to her back every two weeks. CT CAP done 11/19/18 showed sclerotic lesions in several vertebral bodies and the left hemisacrum, suspicious for metastatic disease,stable since 10/6/18  3/6/19- Ms. Cardozo presents today for follow up. She underwent gamma knife on 12/18/18 to four focuses of disease, including right temporal left parietal, right cerebellar, and right frontal. She continues on arimidex and xgeva. Today she notes on and off headaches, which are stable for a long time, relieved by tylenol. She notes a new development of numbness to her lips, with a feeling that something is walking on her face. She denies focal weakness, confusion, seizures, dizziness, trouble with vision.  3/26/19- Ms. Cardozo presents today for follow up. She underwent a brain MRI on 3/19/19 which showed previously noted tiny areas of abnormal enhancement in the posterior fossa tentorial region are no longer seen which is likely compatible with response to therapy. Clinical correlation and continued close follow up is recommended.  CT CAP showed stable osseous mets. Continues on xgeva and anastrazole.  Today she still has headaches that come and go and are unchanged. She still has intermittent numbness to her face. She denies confusion, dizziness, visual difficulties, trouble swallowing, trouble hearing. She has intermittent numbness to the right toes.  She recently completed antibiotics for bronchitis, still with some residual cough.   7/3/19- Ms. Cardozo presents today for follow up. She has continued to follow with Dr. Shaunna Cohen. She continues on arimidex, herceptin, and perjeta. Due for next body scans in 8/2019.  MRI 7/2/19 showed In comparison with 3/19/2019, no significant interval change, previous noted enhancing foci are again noted longer well appreciated. There is redemonstration of foci of hyperintense T2 and FLAIR signal within the right frontal, left subinsular and temporal lobes without interval change from prior. There is no new large focal abnormal areas of enhancement, restricted diffusion, hemorrhage or midline shift.   Today she feels well. Still has very slight intermittent headaches, not bothersome. Facial numbness is improved. Denies dizziness, trouble with balance, trouble with vision, nausea, vomiting.   11/19/2020- Ms. Cardozo presents today for follow up. She is seen today through TELEPHONE for which she provides verbal consent on 11/19/2020 at 2:39 PM. She has continued to follow with Dr. Cohen.  She is on armimidex, herceptin, and perjeta.  MRI brain in 2/2020 was stable, however her brain MRI 11/3/2020 showed: -Area of abnormal T1 and T2 prolongation with associated enhancement is again seen involving the left posterior temporal cortex. This finding continues to increase when compared with the prior study and currently measures approximately 0.7 x 1.1 cm. This finding is worrisome for an underlying lesion such as metastasis given patient's history.  -Small focus of abnormal enhancement involving the high left frontal cortex (series 9 image 132). This finding measures approximately 0.6 cm and previously measured approximately 0.2 cm. -Abnormal enhancement involving the right posterior temporal/inferior parietal cortex is again seen. This finding measures approximately 0.9 cm and previously measured approximately 0.6 cm. -Tiny area of abnormal enhancement is seen involving the left frontal subcortical region. This best seen on series 10 image 17 and measures approximately 0.2 cm. -Tiny focus of enhancement involving the high left frontal cortex (series 9 image 131). This finding measures approximately 0.5 cm.  Most recent body imaging 11/3/2020 showed a stable appearance of the chest, abdomen, and pelvis.   Today she feels well. denies headaches. She notes some pain to her lower back, which is longstanding, though has gotten worse in recent months. sometimes with numbness in the legs.  Denies nausea, vomiting, focal weakness.   2/17/2021- Ms. Cardozo presents today for follow up. She underwent a brain MRI  2/17/2021. This revealed Compared to the previous studies, the enhancing lesion in the right parietal lobe has resolved.  The lesions in the left anterior insula, left temporal lobe and left inferior cerebellum remain stable in size. Right occipital FLAIR hyperintensity without appreciable enhancement has also remained stable.  Continues following with Dr. Cohen. Remains on anastrazole, herceptin, and perjeta.  Today she denies headaches, nausea, vomiting. Has some intermittent numbness to the right face which is stable. She has back pain to the lower back and bilateral upper and lower extremities. This pain is stable.  5/25/2021- Ms. Cardozo presents today follow up. She continues to follow with Dr. Cohen and continues on Herceptin, Perjeta, and Arimidex.  Today she notes left eye pain, and feels her right eye is bulging. She has left fontal intermittent headaches that do not require medications. Chronic bilateral LE pain. She has numbness to her face.   MRI brain 5/20/2021 showed Abnormal lesions in the posterior fossa and supratentorial region. Some of these lesions have increased in size which is worrisome for progression of patient's underlying disease process. Clinical correlation continued close interval follow-up is recommended.  9/21/2021- Ms. Cardozo presents today for follow up. MRI brain done 8/23/2021 showed decreased size and enhancement of left inferior cerebellar lesion. Grossly stable bilateral temporal and left insular lesions. No new lesions.  Last body imaging CT CAP 5/29/2021 showed Stable findings. Osseous metastatic disease is without change in the chest and abdomen. Continues to follow with Dr. Cohen. Xeloda tucatinib herceptin started 6/15/2021 due to POD in brain however was discontinued in 8/2021 due to fatigue, diarrhea, SILAS, LE swelling, and pain. She is back on anastrazole herceptin perjeta.   Today she is feeling ok. She notes some headaches from time to time which have been stable over a long time. Stable facial numbness. no new nausea, vomiting, weakness. remains bothered by fluid retention which started after taking xeloda/tucatinib.  12/22/2021: Patient presents today for follow-up. She discontinued tucatinib/xeloda due to poor tolerability.  She presents with a new MRI.   MRI Head w/wo IV contrast (12/18/2021) shows 5 SMALL LESIONS AS DESCRIBED, 2 OF WHICH ARE STABLE, 2 WHICH HAVE MILDLY INCREASED IN SIZE, AND ONE WHICH APPEARS NEW. FINDINGS SUGGEST MILD INTERVAL PROGRESSION OF PATIENT'S KNOWN METASTATIC DISEASE. RECOMMEND CLINICAL CORRELATION AND INTERVAL FOLLOW-UP. Today denies denies complaints.   1/19/2022: Pt presents for follow-up. MRI 1/19/22 shows further slight progression of the cerebellar lesion while other lesions remain stable.    3/16/2022: Pt presents for PTE. Completed Linac SRS 2000 cgy/1fx to L cerebellum on 1/25/22. Reports feeling well overall. Having occasional HA (3/10, no medication). Notes mild pain and weakness in L shoulder, but denies general muscle weakness. Continues to have numbness in hands following secondary to chemo. Currently receiving Herceptin, Perjeta, and Anastrazole with Dr. Cohen.  4/14/2022- Ms. Cardozo presents today for PTE. Her MRI was done on 4/13, there is no final MRI but appears to be a good response to the left cerebellar lesion. Continues on kanjinti and perjeta.  CT CAP 3/26/2022 showed Stable examination. Sclerotic densities in bone which have not significantly changed.  Today she feels well. notes slight intermittent headaches, unchanged recently. no new focal weakness. notes a bald spot to her posterior head  7/13/2022 - Ms. Cardozo presents today for follow up.  Continues to follow with Dr. Cohen. Continues on herceptin and xgeva.  Brain MRI done 7/10/2022.  Body imaging 3/2022 stable.  10/26/2022- Ms. Cardozo presents today for follow up.  CT CAP 9/7/2022 showed Stable examination. Sclerotic densities in bone which have not significantly changed.  MRI brain 10/21/2022 showed New punctate focus of enhancement within the right caudate nucleus. Enhancing left insular nodule appears minimally larger measuring 6 x 5 mm, previously measured 5 x 4 mm. Slight increased enhancement associated with the anterior right frontal FLAIR signal abnormality.  Enhancing nodule in the left temporal region appears essentially stable. New linear focus of enhancement just medial to the lesion may be vascular in nature. Stable signal abnormality with vague enhancement anteromedial aspect of the right temporal lobe.  Findings suggest mild disease progression. Recommend clinical correlation and close interval follow-up.  continues on herceptin/perjeta.  Feeling well today. no headaches, no nausea, no focal weakness or confusion or dizziness.  1/25/2023- Ms. Cardozo presents today for follow up. Seen through TELEPHONE for which she consents on 1/25/2023 at 3:10 PM. MRI brain done 12/21/2022 showed Mild interval increase in size of the small focus of enhancement within the right caudate body currently measuring 3 mm, previously 2 mm. Other areas of signal abnormality and enhancement are stable. Please see report for description.  No new lesions seen.  Continues to follow with Dr. Cohen. Continues on Herceptin and Perjeta every 3 weeks. No new body imaging since September 2022, which was stable. Feeling well overall no headaches, no nausea, no focal weakness, no confusion.  3/9/2023 She presents for follow up. At last visit, repeat MRI ordered 2 months from last MRI for concerning increase in size of the right caudate lesion.  2/25/23 Brain MRI IMPRESSION: No cystic change compared with 12/21/2022. 1. Stable bilateral supratentorial enhancing nodules, as described in detail above, compatible with the provided history of metastatic disease. 2. No new lesions visualized.  3/2/2023 CT C/A/P showed stable exam  Today she reports headaches relived with Tylenol or Advil. Today she reported pain to left leg while walking into the building(likely claudication) encouraged to follow up with PCP.  Visit dated 9/19/2023 patient returns for f/u with images for review. Reports intermittent HAs, occasional "I feel I would pass out and double vision also a spinning sensation of the room" about three weeks ago. Today she is w/o those symptoms. Continues to follow with Dr. Cohen on Herceptin/Perjeta/ Anastrozole Xgeva next dose 1/2024  MRI brain ww/o contrast 9/8/2023 IMPRESSION: No hydrocephalus or new enhancing lesions. 5 parenchymal lesions are identified which appear similar to the prior exam of 2/25/2023. Right caudate head lesion is slightly larger measuring 4.8 mm compared with the prior of 3.2 mm.  CT C/A/P 9/1/82023 IMPRESSION: Stable examination as compared to CT 3/2/2023. Sclerotic osseous lesions, without significant change.  Visit dated 11/29/2023   Patient returns for post treatment f/u and progress check after completion of GKRS Right caudate lesion 9/25/2023 2000cgy over 1 Fx. Reports doing well post treatment. Endorses intermittent HAs Denies N/V, unilateral extremity weakness/memory changes/gait disturbance/bowel/bladder dysfunction or other neurologic symptoms. No issues with speech or comprehension. Persistent knee pain for which she gets injections and is currently in PT  Continues to follow with Dr. Cohen on Herceptin/Perjeta every 3 weeks.   Visit dated 1/10/2024   Patient returns for routine follow up to include progress check and review of completed cranial images. Denies N/V, HA/unilateral extremity weakness/memory changes/gait disturbance/bowel/bladder dysfunction or other neurologic symptoms. No issues with speech or comprehension. SHe is w/o any new verbalized concerns at today's visit.  Continues to follow with Dr. Cohen on Herceptin /Perjeta every 3 weeks since 8/2021 MRI brain w w/o contrast 1/8/2024 - stable on my review, pending read  VISIT DATED: 9/17/2024 Patient returns for routine f/u and progress check with cranial images for review. Reports doing well from a neurological standpoint. Does note bilateral leg edema per patient workup thus far negative. Plans to see vascular on 9/20/2024 for further workup. Leg edema does result in difficulty with extended.  Continues to follow with Dr. Cohen on Kanjinti/Perjeta every 3 weeks since 8/2021.  Also, on Anastrozole Last CT C/A/P w w/o contrast 6/20/2024 IMPRESSION: Stable examination. Sclerotic osseous lesions, unchanged.  MRI brain w w/o contrast 9/16/2024 no final read available at this time  VISIT DATED: 12/18/2024 Patient presents for routine f/u and progress check with cranial images for review. Reports she continues to do well. Does note neuropathy of the hands/ feet. Uses a cane for safety. Intermittent headaches. She is w/o any new concerns today. Continues to follow with Dr. Cohen on Herceptin /Perjeta every 3 weeks since 8/2021 with recent CT C/A/P 12/7/2024 w/o any new findings.  MRI brain w w/o contrast 12/16/2024 IMPRESSION: Multiple parenchymal lesions compatible with metastatic disease. Anterior left insular lesion (16-80) appears stable measuring 6 x 3 mm previously 6 x 3 mm. Punctate anterior right frontal cortical lesion (16-30) in retrospect appears stable measuring 2 mm previously 2 mm. Punctate medial right occipital lesion (16-90) appears stable measuring 2 mm. Linear left temporal lesion (16-71) appears stable measuring 12 x 3 mm. Previously described punctate left frontal subcortical lesion no longer seen. No new lesions identified  VISIT DATED: 3/12/2025 Ms. Cardozo presents for routine f/u and progress check with cranial images for review. States neurologically doing well but notes generalized aches/pain for which she uses OTC Tylenol.  Continues to follow with Dr. Cohen on Herceptin /Perjeta every 3 weeks since 8/2021.Also follows with Dr. Whitehead to monitor for Herceptin induced cardiotoxicity which changes made to her medication regimen to include a diuretics.  MRI brain w w/o contrast 3/7/2025 IMPRESSION: Stable small parenchymal lesions. No new lesions identified

## 2025-03-13 NOTE — PHYSICAL EXAM
[Obese] : obese [] : no respiratory distress [Heart Rate And Rhythm] : heart rate and rhythm were normal [Oriented To Time, Place, And Person] : oriented to person, place, and time [de-identified] : uses cane for stability

## 2025-03-13 NOTE — REVIEW OF SYSTEMS
[Muscle Pain] : muscle pain [Muscle Weakness] : muscle weakness [Dizziness] : dizziness [Negative] : Genitourinary [Cognitive Disturbance: Grade 0] : Cognitive Disturbance: Grade 0 [Concentration Impairment: Grade 0] : Concentration Impairment: Grade 0 [Dizziness: Grade 0] : Dizziness: Grade 0  [Facial Muscle Weakness: Grade 0] : Facial Muscle Weakness: Grade 0 [Headache: Grade 1 - Mild pain] : Headache: Grade 1 - Mild pain [Lethargy: Grade 0] : Lethargy: Grade 0 [Meningismus: Grade 0] : Meningismus: Grade 0 [Peripheral Motor Neuropathy: Grade 0] : Peripheral Motor Neuropathy: Grade 0 [Peripheral Sensory Neuropathy: Grade 1 - Asymptomatic; loss of deep tendon reflexes or paresthesia] : Peripheral Sensory Neuropathy: Grade 1 - Asymptomatic; loss of deep tendon reflexes or paresthesia [Somnolence: Grade 0] : Somnolence: Grade 0 [Confused] : no confusion [FreeTextEntry9] : has pain "all over the place." uses cane for safety LEFT knee pain impairs walking [de-identified] : Intermittent HAs, + neuropathy hands/feet

## 2025-03-13 NOTE — PHYSICAL EXAM
[Obese] : obese [] : no respiratory distress [Heart Rate And Rhythm] : heart rate and rhythm were normal [Oriented To Time, Place, And Person] : oriented to person, place, and time [de-identified] : uses cane for stability

## 2025-03-13 NOTE — REVIEW OF SYSTEMS
[Muscle Pain] : muscle pain [Muscle Weakness] : muscle weakness [Dizziness] : dizziness [Negative] : Genitourinary [Cognitive Disturbance: Grade 0] : Cognitive Disturbance: Grade 0 [Concentration Impairment: Grade 0] : Concentration Impairment: Grade 0 [Dizziness: Grade 0] : Dizziness: Grade 0  [Facial Muscle Weakness: Grade 0] : Facial Muscle Weakness: Grade 0 [Headache: Grade 1 - Mild pain] : Headache: Grade 1 - Mild pain [Lethargy: Grade 0] : Lethargy: Grade 0 [Meningismus: Grade 0] : Meningismus: Grade 0 [Peripheral Motor Neuropathy: Grade 0] : Peripheral Motor Neuropathy: Grade 0 [Peripheral Sensory Neuropathy: Grade 1 - Asymptomatic; loss of deep tendon reflexes or paresthesia] : Peripheral Sensory Neuropathy: Grade 1 - Asymptomatic; loss of deep tendon reflexes or paresthesia [Somnolence: Grade 0] : Somnolence: Grade 0 [Confused] : no confusion [FreeTextEntry9] : has pain "all over the place." uses cane for safety LEFT knee pain impairs walking [de-identified] : Intermittent HAs, + neuropathy hands/feet

## 2025-03-13 NOTE — REVIEW OF SYSTEMS
[Muscle Pain] : muscle pain [Muscle Weakness] : muscle weakness [Dizziness] : dizziness [Negative] : Genitourinary [Cognitive Disturbance: Grade 0] : Cognitive Disturbance: Grade 0 [Concentration Impairment: Grade 0] : Concentration Impairment: Grade 0 [Dizziness: Grade 0] : Dizziness: Grade 0  [Facial Muscle Weakness: Grade 0] : Facial Muscle Weakness: Grade 0 [Headache: Grade 1 - Mild pain] : Headache: Grade 1 - Mild pain [Lethargy: Grade 0] : Lethargy: Grade 0 [Meningismus: Grade 0] : Meningismus: Grade 0 [Peripheral Motor Neuropathy: Grade 0] : Peripheral Motor Neuropathy: Grade 0 [Peripheral Sensory Neuropathy: Grade 1 - Asymptomatic; loss of deep tendon reflexes or paresthesia] : Peripheral Sensory Neuropathy: Grade 1 - Asymptomatic; loss of deep tendon reflexes or paresthesia [Somnolence: Grade 0] : Somnolence: Grade 0 [Confused] : no confusion [FreeTextEntry9] : has pain "all over the place." uses cane for safety LEFT knee pain impairs walking [de-identified] : Intermittent HAs, + neuropathy hands/feet

## 2025-03-13 NOTE — HISTORY OF PRESENT ILLNESS
[FreeTextEntry1] : Ms. Cardozo presents with a HER-2 positive breast cancer diagnosed in 2017 with multiple brain metastasis s/p gamma knife in 5/2017 to a right frontal, right parietal, left insular, left parietal, left parietotemporal, and right medial temporal areas. She completed additional gamma knife radiation to a right temporal, left parietal, right cerebella, and right frontal metastasis 12/18/18.  She additionally completed gamma knife to a right parietal and right frontal met on 12/3/2020.  Ms. Cardozo completed SRS on the LINAC for a total of 2000 cgy to the left cerebellum on 1/25/2022. GKRS Right caudate lesion 9/25/2023 2000cgy over 1 Fx.  Oncologic history: She initially presented with aphasia to the [facility] ER in late April 2017. CT head demonstrated a left temporofrontal lesion and MRI demonstrated multiple enhancing lesions with surrounding vasogenic edema without leptomeningeal enhancement. She underwent a CT Chest, Abdomen, and Pelvis demonstrating right axillary and pectoral lymphadenopathy, an irregular mass in the right breast, a 1.5 cm celiac axis lymph node and an omental implant suspicious for peritoneal carcinomatosis. She underwent right axillary ultrasound guided biopsy demonstrating ER+/AK negative/Her2 positive breast cancer. She underwent gamma knife SRS to multiple brain metastasis on 5/30/17 and did well. She then went on THP chemotherapy in July 2017, transitioned to HP chemotherapy, and  anastrozole as well.  PET/CT on 3/17/18 demonstrated increased FDG activity in the right breast with SUV 3.2, previously 2.7 with FDG-avid skin thickening overlying the breast. Otherwise it demonstrated a non-specific sclerotic lesion in the body of T8 and a lucent lesion in the body of T5 without increased FDG activity. MRI brain demonstrated a new tiny focus of abnormal enhancement in the right cerebellum measuring 0.2 mm with otherwise stable/decreasing size/enhancement of metastases. She was not a candidate for IA herceptin. She saw Dr. Phipps in April who recommended expectant management. She is now on arimidex + herceptin, perjeta.   7/19/19 Repeat MRI in June 2018 showed overall stable or diminished lesion but demonstrated one area of abnormal enhancement v artifact seen in left parietal region. PET/CT in June 2018 was stable with no new disease.  Today, patient reports no HA, vision changes, or dizziness. She reports new left leg numbness and pins/needle sensation on the anterior surface of her legs and pain radiation down from her back to her toes. She has a history of sciatica but the numbness is new. Ms. Cardozo denies urinary retention or lower extremity weakness. She is scheduled for MR of the spine on Sunday. In addition, she also reports problem with sleeping as well as diffuse aches in her shoulders, arms, and legs  12/5/18- Ms. Cardozo presents today for follow up . Since she saw us last she has continued to follow with Dr. Shaunna Cohen. Has continued on arimidex.  MRI brain done 11/30/18 showed multiple new brain mets worrisome for metastatic disease. Today she notes she has headaches, 2-3/10/ in varying spots on her head. These have been long standing, even before treatment. Denies focal weakness. Notes numbness to the right leg for the past week. Denies dizziness, trouble walking. Has lower longstanding, currently having injections to her back every two weeks. CT CAP done 11/19/18 showed sclerotic lesions in several vertebral bodies and the left hemisacrum, suspicious for metastatic disease,stable since 10/6/18  3/6/19- Ms. Cardozo presents today for follow up. She underwent gamma knife on 12/18/18 to four focuses of disease, including right temporal left parietal, right cerebellar, and right frontal. She continues on arimidex and xgeva. Today she notes on and off headaches, which are stable for a long time, relieved by tylenol. She notes a new development of numbness to her lips, with a feeling that something is walking on her face. She denies focal weakness, confusion, seizures, dizziness, trouble with vision.  3/26/19- Ms. Cardozo presents today for follow up. She underwent a brain MRI on 3/19/19 which showed previously noted tiny areas of abnormal enhancement in the posterior fossa tentorial region are no longer seen which is likely compatible with response to therapy. Clinical correlation and continued close follow up is recommended.  CT CAP showed stable osseous mets. Continues on xgeva and anastrazole.  Today she still has headaches that come and go and are unchanged. She still has intermittent numbness to her face. She denies confusion, dizziness, visual difficulties, trouble swallowing, trouble hearing. She has intermittent numbness to the right toes.  She recently completed antibiotics for bronchitis, still with some residual cough.   7/3/19- Ms. Cardozo presents today for follow up. She has continued to follow with Dr. Shaunna Cohen. She continues on arimidex, herceptin, and perjeta. Due for next body scans in 8/2019.  MRI 7/2/19 showed In comparison with 3/19/2019, no significant interval change, previous noted enhancing foci are again noted longer well appreciated. There is redemonstration of foci of hyperintense T2 and FLAIR signal within the right frontal, left subinsular and temporal lobes without interval change from prior. There is no new large focal abnormal areas of enhancement, restricted diffusion, hemorrhage or midline shift.   Today she feels well. Still has very slight intermittent headaches, not bothersome. Facial numbness is improved. Denies dizziness, trouble with balance, trouble with vision, nausea, vomiting.   11/19/2020- Ms. Cardozo presents today for follow up. She is seen today through TELEPHONE for which she provides verbal consent on 11/19/2020 at 2:39 PM. She has continued to follow with Dr. Cohen.  She is on armimidex, herceptin, and perjeta.  MRI brain in 2/2020 was stable, however her brain MRI 11/3/2020 showed: -Area of abnormal T1 and T2 prolongation with associated enhancement is again seen involving the left posterior temporal cortex. This finding continues to increase when compared with the prior study and currently measures approximately 0.7 x 1.1 cm. This finding is worrisome for an underlying lesion such as metastasis given patient's history.  -Small focus of abnormal enhancement involving the high left frontal cortex (series 9 image 132). This finding measures approximately 0.6 cm and previously measured approximately 0.2 cm. -Abnormal enhancement involving the right posterior temporal/inferior parietal cortex is again seen. This finding measures approximately 0.9 cm and previously measured approximately 0.6 cm. -Tiny area of abnormal enhancement is seen involving the left frontal subcortical region. This best seen on series 10 image 17 and measures approximately 0.2 cm. -Tiny focus of enhancement involving the high left frontal cortex (series 9 image 131). This finding measures approximately 0.5 cm.  Most recent body imaging 11/3/2020 showed a stable appearance of the chest, abdomen, and pelvis.   Today she feels well. denies headaches. She notes some pain to her lower back, which is longstanding, though has gotten worse in recent months. sometimes with numbness in the legs.  Denies nausea, vomiting, focal weakness.   2/17/2021- Ms. Cardozo presents today for follow up. She underwent a brain MRI  2/17/2021. This revealed Compared to the previous studies, the enhancing lesion in the right parietal lobe has resolved.  The lesions in the left anterior insula, left temporal lobe and left inferior cerebellum remain stable in size. Right occipital FLAIR hyperintensity without appreciable enhancement has also remained stable.  Continues following with Dr. Cohen. Remains on anastrazole, herceptin, and perjeta.  Today she denies headaches, nausea, vomiting. Has some intermittent numbness to the right face which is stable. She has back pain to the lower back and bilateral upper and lower extremities. This pain is stable.  5/25/2021- Ms. Cardozo presents today follow up. She continues to follow with Dr. Cohen and continues on Herceptin, Perjeta, and Arimidex.  Today she notes left eye pain, and feels her right eye is bulging. She has left fontal intermittent headaches that do not require medications. Chronic bilateral LE pain. She has numbness to her face.   MRI brain 5/20/2021 showed Abnormal lesions in the posterior fossa and supratentorial region. Some of these lesions have increased in size which is worrisome for progression of patient's underlying disease process. Clinical correlation continued close interval follow-up is recommended.  9/21/2021- Ms. Cardozo presents today for follow up. MRI brain done 8/23/2021 showed decreased size and enhancement of left inferior cerebellar lesion. Grossly stable bilateral temporal and left insular lesions. No new lesions.  Last body imaging CT CAP 5/29/2021 showed Stable findings. Osseous metastatic disease is without change in the chest and abdomen. Continues to follow with Dr. Cohen. Xeloda tucatinib herceptin started 6/15/2021 due to POD in brain however was discontinued in 8/2021 due to fatigue, diarrhea, SILAS, LE swelling, and pain. She is back on anastrazole herceptin perjeta.   Today she is feeling ok. She notes some headaches from time to time which have been stable over a long time. Stable facial numbness. no new nausea, vomiting, weakness. remains bothered by fluid retention which started after taking xeloda/tucatinib.  12/22/2021: Patient presents today for follow-up. She discontinued tucatinib/xeloda due to poor tolerability.  She presents with a new MRI.   MRI Head w/wo IV contrast (12/18/2021) shows 5 SMALL LESIONS AS DESCRIBED, 2 OF WHICH ARE STABLE, 2 WHICH HAVE MILDLY INCREASED IN SIZE, AND ONE WHICH APPEARS NEW. FINDINGS SUGGEST MILD INTERVAL PROGRESSION OF PATIENT'S KNOWN METASTATIC DISEASE. RECOMMEND CLINICAL CORRELATION AND INTERVAL FOLLOW-UP. Today denies denies complaints.   1/19/2022: Pt presents for follow-up. MRI 1/19/22 shows further slight progression of the cerebellar lesion while other lesions remain stable.    3/16/2022: Pt presents for PTE. Completed Linac SRS 2000 cgy/1fx to L cerebellum on 1/25/22. Reports feeling well overall. Having occasional HA (3/10, no medication). Notes mild pain and weakness in L shoulder, but denies general muscle weakness. Continues to have numbness in hands following secondary to chemo. Currently receiving Herceptin, Perjeta, and Anastrazole with Dr. Cohen.  4/14/2022- Ms. Cardozo presents today for PTE. Her MRI was done on 4/13, there is no final MRI but appears to be a good response to the left cerebellar lesion. Continues on kanjinti and perjeta.  CT CAP 3/26/2022 showed Stable examination. Sclerotic densities in bone which have not significantly changed.  Today she feels well. notes slight intermittent headaches, unchanged recently. no new focal weakness. notes a bald spot to her posterior head  7/13/2022 - Ms. Cardozo presents today for follow up.  Continues to follow with Dr. Cohen. Continues on herceptin and xgeva.  Brain MRI done 7/10/2022.  Body imaging 3/2022 stable.  10/26/2022- Ms. Cardozo presents today for follow up.  CT CAP 9/7/2022 showed Stable examination. Sclerotic densities in bone which have not significantly changed.  MRI brain 10/21/2022 showed New punctate focus of enhancement within the right caudate nucleus. Enhancing left insular nodule appears minimally larger measuring 6 x 5 mm, previously measured 5 x 4 mm. Slight increased enhancement associated with the anterior right frontal FLAIR signal abnormality.  Enhancing nodule in the left temporal region appears essentially stable. New linear focus of enhancement just medial to the lesion may be vascular in nature. Stable signal abnormality with vague enhancement anteromedial aspect of the right temporal lobe.  Findings suggest mild disease progression. Recommend clinical correlation and close interval follow-up.  continues on herceptin/perjeta.  Feeling well today. no headaches, no nausea, no focal weakness or confusion or dizziness.  1/25/2023- Ms. Cardozo presents today for follow up. Seen through TELEPHONE for which she consents on 1/25/2023 at 3:10 PM. MRI brain done 12/21/2022 showed Mild interval increase in size of the small focus of enhancement within the right caudate body currently measuring 3 mm, previously 2 mm. Other areas of signal abnormality and enhancement are stable. Please see report for description.  No new lesions seen.  Continues to follow with Dr. Cohen. Continues on Herceptin and Perjeta every 3 weeks. No new body imaging since September 2022, which was stable. Feeling well overall no headaches, no nausea, no focal weakness, no confusion.  3/9/2023 She presents for follow up. At last visit, repeat MRI ordered 2 months from last MRI for concerning increase in size of the right caudate lesion.  2/25/23 Brain MRI IMPRESSION: No cystic change compared with 12/21/2022. 1. Stable bilateral supratentorial enhancing nodules, as described in detail above, compatible with the provided history of metastatic disease. 2. No new lesions visualized.  3/2/2023 CT C/A/P showed stable exam  Today she reports headaches relived with Tylenol or Advil. Today she reported pain to left leg while walking into the building(likely claudication) encouraged to follow up with PCP.  Visit dated 9/19/2023 patient returns for f/u with images for review. Reports intermittent HAs, occasional "I feel I would pass out and double vision also a spinning sensation of the room" about three weeks ago. Today she is w/o those symptoms. Continues to follow with Dr. Cohen on Herceptin/Perjeta/ Anastrozole Xgeva next dose 1/2024  MRI brain ww/o contrast 9/8/2023 IMPRESSION: No hydrocephalus or new enhancing lesions. 5 parenchymal lesions are identified which appear similar to the prior exam of 2/25/2023. Right caudate head lesion is slightly larger measuring 4.8 mm compared with the prior of 3.2 mm.  CT C/A/P 9/1/82023 IMPRESSION: Stable examination as compared to CT 3/2/2023. Sclerotic osseous lesions, without significant change.  Visit dated 11/29/2023   Patient returns for post treatment f/u and progress check after completion of GKRS Right caudate lesion 9/25/2023 2000cgy over 1 Fx. Reports doing well post treatment. Endorses intermittent HAs Denies N/V, unilateral extremity weakness/memory changes/gait disturbance/bowel/bladder dysfunction or other neurologic symptoms. No issues with speech or comprehension. Persistent knee pain for which she gets injections and is currently in PT  Continues to follow with Dr. Cohen on Herceptin/Perjeta every 3 weeks.   Visit dated 1/10/2024   Patient returns for routine follow up to include progress check and review of completed cranial images. Denies N/V, HA/unilateral extremity weakness/memory changes/gait disturbance/bowel/bladder dysfunction or other neurologic symptoms. No issues with speech or comprehension. SHe is w/o any new verbalized concerns at today's visit.  Continues to follow with Dr. Cohen on Herceptin /Perjeta every 3 weeks since 8/2021 MRI brain w w/o contrast 1/8/2024 - stable on my review, pending read  VISIT DATED: 9/17/2024 Patient returns for routine f/u and progress check with cranial images for review. Reports doing well from a neurological standpoint. Does note bilateral leg edema per patient workup thus far negative. Plans to see vascular on 9/20/2024 for further workup. Leg edema does result in difficulty with extended.  Continues to follow with Dr. Cohen on Kanjinti/Perjeta every 3 weeks since 8/2021.  Also, on Anastrozole Last CT C/A/P w w/o contrast 6/20/2024 IMPRESSION: Stable examination. Sclerotic osseous lesions, unchanged.  MRI brain w w/o contrast 9/16/2024 no final read available at this time  VISIT DATED: 12/18/2024 Patient presents for routine f/u and progress check with cranial images for review. Reports she continues to do well. Does note neuropathy of the hands/ feet. Uses a cane for safety. Intermittent headaches. She is w/o any new concerns today. Continues to follow with Dr. Cohen on Herceptin /Perjeta every 3 weeks since 8/2021 with recent CT C/A/P 12/7/2024 w/o any new findings.  MRI brain w w/o contrast 12/16/2024 IMPRESSION: Multiple parenchymal lesions compatible with metastatic disease. Anterior left insular lesion (16-80) appears stable measuring 6 x 3 mm previously 6 x 3 mm. Punctate anterior right frontal cortical lesion (16-30) in retrospect appears stable measuring 2 mm previously 2 mm. Punctate medial right occipital lesion (16-90) appears stable measuring 2 mm. Linear left temporal lesion (16-71) appears stable measuring 12 x 3 mm. Previously described punctate left frontal subcortical lesion no longer seen. No new lesions identified  VISIT DATED: 3/12/2025 Ms. Cardozo presents for routine f/u and progress check with cranial images for review. States neurologically doing well but notes generalized aches/pain for which she uses OTC Tylenol.  Continues to follow with Dr. Cohen on Herceptin /Perjeta every 3 weeks since 8/2021.Also follows with Dr. Whitehead to monitor for Herceptin induced cardiotoxicity which changes made to her medication regimen to include a diuretics.  MRI brain w w/o contrast 3/7/2025 IMPRESSION: Stable small parenchymal lesions. No new lesions identified

## 2025-03-13 NOTE — HISTORY OF PRESENT ILLNESS
[FreeTextEntry1] : Ms. Cardozo presents with a HER-2 positive breast cancer diagnosed in 2017 with multiple brain metastasis s/p gamma knife in 5/2017 to a right frontal, right parietal, left insular, left parietal, left parietotemporal, and right medial temporal areas. She completed additional gamma knife radiation to a right temporal, left parietal, right cerebella, and right frontal metastasis 12/18/18.  She additionally completed gamma knife to a right parietal and right frontal met on 12/3/2020.  Ms. Cardozo completed SRS on the LINAC for a total of 2000 cgy to the left cerebellum on 1/25/2022. GKRS Right caudate lesion 9/25/2023 2000cgy over 1 Fx.  Oncologic history: She initially presented with aphasia to the [facility] ER in late April 2017. CT head demonstrated a left temporofrontal lesion and MRI demonstrated multiple enhancing lesions with surrounding vasogenic edema without leptomeningeal enhancement. She underwent a CT Chest, Abdomen, and Pelvis demonstrating right axillary and pectoral lymphadenopathy, an irregular mass in the right breast, a 1.5 cm celiac axis lymph node and an omental implant suspicious for peritoneal carcinomatosis. She underwent right axillary ultrasound guided biopsy demonstrating ER+/AR negative/Her2 positive breast cancer. She underwent gamma knife SRS to multiple brain metastasis on 5/30/17 and did well. She then went on THP chemotherapy in July 2017, transitioned to HP chemotherapy, and  anastrozole as well.  PET/CT on 3/17/18 demonstrated increased FDG activity in the right breast with SUV 3.2, previously 2.7 with FDG-avid skin thickening overlying the breast. Otherwise it demonstrated a non-specific sclerotic lesion in the body of T8 and a lucent lesion in the body of T5 without increased FDG activity. MRI brain demonstrated a new tiny focus of abnormal enhancement in the right cerebellum measuring 0.2 mm with otherwise stable/decreasing size/enhancement of metastases. She was not a candidate for IA herceptin. She saw Dr. Phipps in April who recommended expectant management. She is now on arimidex + herceptin, perjeta.   7/19/19 Repeat MRI in June 2018 showed overall stable or diminished lesion but demonstrated one area of abnormal enhancement v artifact seen in left parietal region. PET/CT in June 2018 was stable with no new disease.  Today, patient reports no HA, vision changes, or dizziness. She reports new left leg numbness and pins/needle sensation on the anterior surface of her legs and pain radiation down from her back to her toes. She has a history of sciatica but the numbness is new. Ms. Cardozo denies urinary retention or lower extremity weakness. She is scheduled for MR of the spine on Sunday. In addition, she also reports problem with sleeping as well as diffuse aches in her shoulders, arms, and legs  12/5/18- Ms. Cardozo presents today for follow up . Since she saw us last she has continued to follow with Dr. Shaunna Cohen. Has continued on arimidex.  MRI brain done 11/30/18 showed multiple new brain mets worrisome for metastatic disease. Today she notes she has headaches, 2-3/10/ in varying spots on her head. These have been long standing, even before treatment. Denies focal weakness. Notes numbness to the right leg for the past week. Denies dizziness, trouble walking. Has lower longstanding, currently having injections to her back every two weeks. CT CAP done 11/19/18 showed sclerotic lesions in several vertebral bodies and the left hemisacrum, suspicious for metastatic disease,stable since 10/6/18  3/6/19- Ms. Cardozo presents today for follow up. She underwent gamma knife on 12/18/18 to four focuses of disease, including right temporal left parietal, right cerebellar, and right frontal. She continues on arimidex and xgeva. Today she notes on and off headaches, which are stable for a long time, relieved by tylenol. She notes a new development of numbness to her lips, with a feeling that something is walking on her face. She denies focal weakness, confusion, seizures, dizziness, trouble with vision.  3/26/19- Ms. Cardozo presents today for follow up. She underwent a brain MRI on 3/19/19 which showed previously noted tiny areas of abnormal enhancement in the posterior fossa tentorial region are no longer seen which is likely compatible with response to therapy. Clinical correlation and continued close follow up is recommended.  CT CAP showed stable osseous mets. Continues on xgeva and anastrazole.  Today she still has headaches that come and go and are unchanged. She still has intermittent numbness to her face. She denies confusion, dizziness, visual difficulties, trouble swallowing, trouble hearing. She has intermittent numbness to the right toes.  She recently completed antibiotics for bronchitis, still with some residual cough.   7/3/19- Ms. Cardozo presents today for follow up. She has continued to follow with Dr. Shaunna Cohen. She continues on arimidex, herceptin, and perjeta. Due for next body scans in 8/2019.  MRI 7/2/19 showed In comparison with 3/19/2019, no significant interval change, previous noted enhancing foci are again noted longer well appreciated. There is redemonstration of foci of hyperintense T2 and FLAIR signal within the right frontal, left subinsular and temporal lobes without interval change from prior. There is no new large focal abnormal areas of enhancement, restricted diffusion, hemorrhage or midline shift.   Today she feels well. Still has very slight intermittent headaches, not bothersome. Facial numbness is improved. Denies dizziness, trouble with balance, trouble with vision, nausea, vomiting.   11/19/2020- Ms. Cardozo presents today for follow up. She is seen today through TELEPHONE for which she provides verbal consent on 11/19/2020 at 2:39 PM. She has continued to follow with Dr. Cohen.  She is on armimidex, herceptin, and perjeta.  MRI brain in 2/2020 was stable, however her brain MRI 11/3/2020 showed: -Area of abnormal T1 and T2 prolongation with associated enhancement is again seen involving the left posterior temporal cortex. This finding continues to increase when compared with the prior study and currently measures approximately 0.7 x 1.1 cm. This finding is worrisome for an underlying lesion such as metastasis given patient's history.  -Small focus of abnormal enhancement involving the high left frontal cortex (series 9 image 132). This finding measures approximately 0.6 cm and previously measured approximately 0.2 cm. -Abnormal enhancement involving the right posterior temporal/inferior parietal cortex is again seen. This finding measures approximately 0.9 cm and previously measured approximately 0.6 cm. -Tiny area of abnormal enhancement is seen involving the left frontal subcortical region. This best seen on series 10 image 17 and measures approximately 0.2 cm. -Tiny focus of enhancement involving the high left frontal cortex (series 9 image 131). This finding measures approximately 0.5 cm.  Most recent body imaging 11/3/2020 showed a stable appearance of the chest, abdomen, and pelvis.   Today she feels well. denies headaches. She notes some pain to her lower back, which is longstanding, though has gotten worse in recent months. sometimes with numbness in the legs.  Denies nausea, vomiting, focal weakness.   2/17/2021- Ms. Cardozo presents today for follow up. She underwent a brain MRI  2/17/2021. This revealed Compared to the previous studies, the enhancing lesion in the right parietal lobe has resolved.  The lesions in the left anterior insula, left temporal lobe and left inferior cerebellum remain stable in size. Right occipital FLAIR hyperintensity without appreciable enhancement has also remained stable.  Continues following with Dr. Cohen. Remains on anastrazole, herceptin, and perjeta.  Today she denies headaches, nausea, vomiting. Has some intermittent numbness to the right face which is stable. She has back pain to the lower back and bilateral upper and lower extremities. This pain is stable.  5/25/2021- Ms. Cardozo presents today follow up. She continues to follow with Dr. Cohen and continues on Herceptin, Perjeta, and Arimidex.  Today she notes left eye pain, and feels her right eye is bulging. She has left fontal intermittent headaches that do not require medications. Chronic bilateral LE pain. She has numbness to her face.   MRI brain 5/20/2021 showed Abnormal lesions in the posterior fossa and supratentorial region. Some of these lesions have increased in size which is worrisome for progression of patient's underlying disease process. Clinical correlation continued close interval follow-up is recommended.  9/21/2021- Ms. Cardozo presents today for follow up. MRI brain done 8/23/2021 showed decreased size and enhancement of left inferior cerebellar lesion. Grossly stable bilateral temporal and left insular lesions. No new lesions.  Last body imaging CT CAP 5/29/2021 showed Stable findings. Osseous metastatic disease is without change in the chest and abdomen. Continues to follow with Dr. Cohen. Xeloda tucatinib herceptin started 6/15/2021 due to POD in brain however was discontinued in 8/2021 due to fatigue, diarrhea, SILAS, LE swelling, and pain. She is back on anastrazole herceptin perjeta.   Today she is feeling ok. She notes some headaches from time to time which have been stable over a long time. Stable facial numbness. no new nausea, vomiting, weakness. remains bothered by fluid retention which started after taking xeloda/tucatinib.  12/22/2021: Patient presents today for follow-up. She discontinued tucatinib/xeloda due to poor tolerability.  She presents with a new MRI.   MRI Head w/wo IV contrast (12/18/2021) shows 5 SMALL LESIONS AS DESCRIBED, 2 OF WHICH ARE STABLE, 2 WHICH HAVE MILDLY INCREASED IN SIZE, AND ONE WHICH APPEARS NEW. FINDINGS SUGGEST MILD INTERVAL PROGRESSION OF PATIENT'S KNOWN METASTATIC DISEASE. RECOMMEND CLINICAL CORRELATION AND INTERVAL FOLLOW-UP. Today denies denies complaints.   1/19/2022: Pt presents for follow-up. MRI 1/19/22 shows further slight progression of the cerebellar lesion while other lesions remain stable.    3/16/2022: Pt presents for PTE. Completed Linac SRS 2000 cgy/1fx to L cerebellum on 1/25/22. Reports feeling well overall. Having occasional HA (3/10, no medication). Notes mild pain and weakness in L shoulder, but denies general muscle weakness. Continues to have numbness in hands following secondary to chemo. Currently receiving Herceptin, Perjeta, and Anastrazole with Dr. Cohen.  4/14/2022- Ms. Cardozo presents today for PTE. Her MRI was done on 4/13, there is no final MRI but appears to be a good response to the left cerebellar lesion. Continues on kanjinti and perjeta.  CT CAP 3/26/2022 showed Stable examination. Sclerotic densities in bone which have not significantly changed.  Today she feels well. notes slight intermittent headaches, unchanged recently. no new focal weakness. notes a bald spot to her posterior head  7/13/2022 - Ms. Cardozo presents today for follow up.  Continues to follow with Dr. Cohen. Continues on herceptin and xgeva.  Brain MRI done 7/10/2022.  Body imaging 3/2022 stable.  10/26/2022- Ms. Cardozo presents today for follow up.  CT CAP 9/7/2022 showed Stable examination. Sclerotic densities in bone which have not significantly changed.  MRI brain 10/21/2022 showed New punctate focus of enhancement within the right caudate nucleus. Enhancing left insular nodule appears minimally larger measuring 6 x 5 mm, previously measured 5 x 4 mm. Slight increased enhancement associated with the anterior right frontal FLAIR signal abnormality.  Enhancing nodule in the left temporal region appears essentially stable. New linear focus of enhancement just medial to the lesion may be vascular in nature. Stable signal abnormality with vague enhancement anteromedial aspect of the right temporal lobe.  Findings suggest mild disease progression. Recommend clinical correlation and close interval follow-up.  continues on herceptin/perjeta.  Feeling well today. no headaches, no nausea, no focal weakness or confusion or dizziness.  1/25/2023- Ms. Cardozo presents today for follow up. Seen through TELEPHONE for which she consents on 1/25/2023 at 3:10 PM. MRI brain done 12/21/2022 showed Mild interval increase in size of the small focus of enhancement within the right caudate body currently measuring 3 mm, previously 2 mm. Other areas of signal abnormality and enhancement are stable. Please see report for description.  No new lesions seen.  Continues to follow with Dr. Cohen. Continues on Herceptin and Perjeta every 3 weeks. No new body imaging since September 2022, which was stable. Feeling well overall no headaches, no nausea, no focal weakness, no confusion.  3/9/2023 She presents for follow up. At last visit, repeat MRI ordered 2 months from last MRI for concerning increase in size of the right caudate lesion.  2/25/23 Brain MRI IMPRESSION: No cystic change compared with 12/21/2022. 1. Stable bilateral supratentorial enhancing nodules, as described in detail above, compatible with the provided history of metastatic disease. 2. No new lesions visualized.  3/2/2023 CT C/A/P showed stable exam  Today she reports headaches relived with Tylenol or Advil. Today she reported pain to left leg while walking into the building(likely claudication) encouraged to follow up with PCP.  Visit dated 9/19/2023 patient returns for f/u with images for review. Reports intermittent HAs, occasional "I feel I would pass out and double vision also a spinning sensation of the room" about three weeks ago. Today she is w/o those symptoms. Continues to follow with Dr. Cohen on Herceptin/Perjeta/ Anastrozole Xgeva next dose 1/2024  MRI brain ww/o contrast 9/8/2023 IMPRESSION: No hydrocephalus or new enhancing lesions. 5 parenchymal lesions are identified which appear similar to the prior exam of 2/25/2023. Right caudate head lesion is slightly larger measuring 4.8 mm compared with the prior of 3.2 mm.  CT C/A/P 9/1/82023 IMPRESSION: Stable examination as compared to CT 3/2/2023. Sclerotic osseous lesions, without significant change.  Visit dated 11/29/2023   Patient returns for post treatment f/u and progress check after completion of GKRS Right caudate lesion 9/25/2023 2000cgy over 1 Fx. Reports doing well post treatment. Endorses intermittent HAs Denies N/V, unilateral extremity weakness/memory changes/gait disturbance/bowel/bladder dysfunction or other neurologic symptoms. No issues with speech or comprehension. Persistent knee pain for which she gets injections and is currently in PT  Continues to follow with Dr. Cohen on Herceptin/Perjeta every 3 weeks.   Visit dated 1/10/2024   Patient returns for routine follow up to include progress check and review of completed cranial images. Denies N/V, HA/unilateral extremity weakness/memory changes/gait disturbance/bowel/bladder dysfunction or other neurologic symptoms. No issues with speech or comprehension. SHe is w/o any new verbalized concerns at today's visit.  Continues to follow with Dr. Cohen on Herceptin /Perjeta every 3 weeks since 8/2021 MRI brain w w/o contrast 1/8/2024 - stable on my review, pending read  VISIT DATED: 9/17/2024 Patient returns for routine f/u and progress check with cranial images for review. Reports doing well from a neurological standpoint. Does note bilateral leg edema per patient workup thus far negative. Plans to see vascular on 9/20/2024 for further workup. Leg edema does result in difficulty with extended.  Continues to follow with Dr. Cohen on Kanjinti/Perjeta every 3 weeks since 8/2021.  Also, on Anastrozole Last CT C/A/P w w/o contrast 6/20/2024 IMPRESSION: Stable examination. Sclerotic osseous lesions, unchanged.  MRI brain w w/o contrast 9/16/2024 no final read available at this time  VISIT DATED: 12/18/2024 Patient presents for routine f/u and progress check with cranial images for review. Reports she continues to do well. Does note neuropathy of the hands/ feet. Uses a cane for safety. Intermittent headaches. She is w/o any new concerns today. Continues to follow with Dr. Cohen on Herceptin /Perjeta every 3 weeks since 8/2021 with recent CT C/A/P 12/7/2024 w/o any new findings.  MRI brain w w/o contrast 12/16/2024 IMPRESSION: Multiple parenchymal lesions compatible with metastatic disease. Anterior left insular lesion (16-80) appears stable measuring 6 x 3 mm previously 6 x 3 mm. Punctate anterior right frontal cortical lesion (16-30) in retrospect appears stable measuring 2 mm previously 2 mm. Punctate medial right occipital lesion (16-90) appears stable measuring 2 mm. Linear left temporal lesion (16-71) appears stable measuring 12 x 3 mm. Previously described punctate left frontal subcortical lesion no longer seen. No new lesions identified  VISIT DATED: 3/12/2025 Ms. Cardozo presents for routine f/u and progress check with cranial images for review. States neurologically doing well but notes generalized aches/pain for which she uses OTC Tylenol.  Continues to follow with Dr. Cohen on Herceptin /Perjeta every 3 weeks since 8/2021.Also follows with Dr. Whitehead to monitor for Herceptin induced cardiotoxicity which changes made to her medication regimen to include a diuretics.  MRI brain w w/o contrast 3/7/2025 IMPRESSION: Stable small parenchymal lesions. No new lesions identified

## 2025-03-13 NOTE — HISTORY OF PRESENT ILLNESS
[FreeTextEntry1] : Ms. Cardozo presents with a HER-2 positive breast cancer diagnosed in 2017 with multiple brain metastasis s/p gamma knife in 5/2017 to a right frontal, right parietal, left insular, left parietal, left parietotemporal, and right medial temporal areas. She completed additional gamma knife radiation to a right temporal, left parietal, right cerebella, and right frontal metastasis 12/18/18.  She additionally completed gamma knife to a right parietal and right frontal met on 12/3/2020.  Ms. Cardozo completed SRS on the LINAC for a total of 2000 cgy to the left cerebellum on 1/25/2022. GKRS Right caudate lesion 9/25/2023 2000cgy over 1 Fx.  Oncologic history: She initially presented with aphasia to the [facility] ER in late April 2017. CT head demonstrated a left temporofrontal lesion and MRI demonstrated multiple enhancing lesions with surrounding vasogenic edema without leptomeningeal enhancement. She underwent a CT Chest, Abdomen, and Pelvis demonstrating right axillary and pectoral lymphadenopathy, an irregular mass in the right breast, a 1.5 cm celiac axis lymph node and an omental implant suspicious for peritoneal carcinomatosis. She underwent right axillary ultrasound guided biopsy demonstrating ER+/AK negative/Her2 positive breast cancer. She underwent gamma knife SRS to multiple brain metastasis on 5/30/17 and did well. She then went on THP chemotherapy in July 2017, transitioned to HP chemotherapy, and  anastrozole as well.  PET/CT on 3/17/18 demonstrated increased FDG activity in the right breast with SUV 3.2, previously 2.7 with FDG-avid skin thickening overlying the breast. Otherwise it demonstrated a non-specific sclerotic lesion in the body of T8 and a lucent lesion in the body of T5 without increased FDG activity. MRI brain demonstrated a new tiny focus of abnormal enhancement in the right cerebellum measuring 0.2 mm with otherwise stable/decreasing size/enhancement of metastases. She was not a candidate for IA herceptin. She saw Dr. Phipps in April who recommended expectant management. She is now on arimidex + herceptin, perjeta.   7/19/19 Repeat MRI in June 2018 showed overall stable or diminished lesion but demonstrated one area of abnormal enhancement v artifact seen in left parietal region. PET/CT in June 2018 was stable with no new disease.  Today, patient reports no HA, vision changes, or dizziness. She reports new left leg numbness and pins/needle sensation on the anterior surface of her legs and pain radiation down from her back to her toes. She has a history of sciatica but the numbness is new. Ms. Cardozo denies urinary retention or lower extremity weakness. She is scheduled for MR of the spine on Sunday. In addition, she also reports problem with sleeping as well as diffuse aches in her shoulders, arms, and legs  12/5/18- Ms. Cardozo presents today for follow up . Since she saw us last she has continued to follow with Dr. Shaunna Cohen. Has continued on arimidex.  MRI brain done 11/30/18 showed multiple new brain mets worrisome for metastatic disease. Today she notes she has headaches, 2-3/10/ in varying spots on her head. These have been long standing, even before treatment. Denies focal weakness. Notes numbness to the right leg for the past week. Denies dizziness, trouble walking. Has lower longstanding, currently having injections to her back every two weeks. CT CAP done 11/19/18 showed sclerotic lesions in several vertebral bodies and the left hemisacrum, suspicious for metastatic disease,stable since 10/6/18  3/6/19- Ms. Cardozo presents today for follow up. She underwent gamma knife on 12/18/18 to four focuses of disease, including right temporal left parietal, right cerebellar, and right frontal. She continues on arimidex and xgeva. Today she notes on and off headaches, which are stable for a long time, relieved by tylenol. She notes a new development of numbness to her lips, with a feeling that something is walking on her face. She denies focal weakness, confusion, seizures, dizziness, trouble with vision.  3/26/19- Ms. Cardozo presents today for follow up. She underwent a brain MRI on 3/19/19 which showed previously noted tiny areas of abnormal enhancement in the posterior fossa tentorial region are no longer seen which is likely compatible with response to therapy. Clinical correlation and continued close follow up is recommended.  CT CAP showed stable osseous mets. Continues on xgeva and anastrazole.  Today she still has headaches that come and go and are unchanged. She still has intermittent numbness to her face. She denies confusion, dizziness, visual difficulties, trouble swallowing, trouble hearing. She has intermittent numbness to the right toes.  She recently completed antibiotics for bronchitis, still with some residual cough.   7/3/19- Ms. Cardozo presents today for follow up. She has continued to follow with Dr. Shaunna Cohen. She continues on arimidex, herceptin, and perjeta. Due for next body scans in 8/2019.  MRI 7/2/19 showed In comparison with 3/19/2019, no significant interval change, previous noted enhancing foci are again noted longer well appreciated. There is redemonstration of foci of hyperintense T2 and FLAIR signal within the right frontal, left subinsular and temporal lobes without interval change from prior. There is no new large focal abnormal areas of enhancement, restricted diffusion, hemorrhage or midline shift.   Today she feels well. Still has very slight intermittent headaches, not bothersome. Facial numbness is improved. Denies dizziness, trouble with balance, trouble with vision, nausea, vomiting.   11/19/2020- Ms. Cardozo presents today for follow up. She is seen today through TELEPHONE for which she provides verbal consent on 11/19/2020 at 2:39 PM. She has continued to follow with Dr. Cohen.  She is on armimidex, herceptin, and perjeta.  MRI brain in 2/2020 was stable, however her brain MRI 11/3/2020 showed: -Area of abnormal T1 and T2 prolongation with associated enhancement is again seen involving the left posterior temporal cortex. This finding continues to increase when compared with the prior study and currently measures approximately 0.7 x 1.1 cm. This finding is worrisome for an underlying lesion such as metastasis given patient's history.  -Small focus of abnormal enhancement involving the high left frontal cortex (series 9 image 132). This finding measures approximately 0.6 cm and previously measured approximately 0.2 cm. -Abnormal enhancement involving the right posterior temporal/inferior parietal cortex is again seen. This finding measures approximately 0.9 cm and previously measured approximately 0.6 cm. -Tiny area of abnormal enhancement is seen involving the left frontal subcortical region. This best seen on series 10 image 17 and measures approximately 0.2 cm. -Tiny focus of enhancement involving the high left frontal cortex (series 9 image 131). This finding measures approximately 0.5 cm.  Most recent body imaging 11/3/2020 showed a stable appearance of the chest, abdomen, and pelvis.   Today she feels well. denies headaches. She notes some pain to her lower back, which is longstanding, though has gotten worse in recent months. sometimes with numbness in the legs.  Denies nausea, vomiting, focal weakness.   2/17/2021- Ms. Cardozo presents today for follow up. She underwent a brain MRI  2/17/2021. This revealed Compared to the previous studies, the enhancing lesion in the right parietal lobe has resolved.  The lesions in the left anterior insula, left temporal lobe and left inferior cerebellum remain stable in size. Right occipital FLAIR hyperintensity without appreciable enhancement has also remained stable.  Continues following with Dr. Cohen. Remains on anastrazole, herceptin, and perjeta.  Today she denies headaches, nausea, vomiting. Has some intermittent numbness to the right face which is stable. She has back pain to the lower back and bilateral upper and lower extremities. This pain is stable.  5/25/2021- Ms. Cardozo presents today follow up. She continues to follow with Dr. Cohen and continues on Herceptin, Perjeta, and Arimidex.  Today she notes left eye pain, and feels her right eye is bulging. She has left fontal intermittent headaches that do not require medications. Chronic bilateral LE pain. She has numbness to her face.   MRI brain 5/20/2021 showed Abnormal lesions in the posterior fossa and supratentorial region. Some of these lesions have increased in size which is worrisome for progression of patient's underlying disease process. Clinical correlation continued close interval follow-up is recommended.  9/21/2021- Ms. Cardozo presents today for follow up. MRI brain done 8/23/2021 showed decreased size and enhancement of left inferior cerebellar lesion. Grossly stable bilateral temporal and left insular lesions. No new lesions.  Last body imaging CT CAP 5/29/2021 showed Stable findings. Osseous metastatic disease is without change in the chest and abdomen. Continues to follow with Dr. Cohen. Xeloda tucatinib herceptin started 6/15/2021 due to POD in brain however was discontinued in 8/2021 due to fatigue, diarrhea, SILAS, LE swelling, and pain. She is back on anastrazole herceptin perjeta.   Today she is feeling ok. She notes some headaches from time to time which have been stable over a long time. Stable facial numbness. no new nausea, vomiting, weakness. remains bothered by fluid retention which started after taking xeloda/tucatinib.  12/22/2021: Patient presents today for follow-up. She discontinued tucatinib/xeloda due to poor tolerability.  She presents with a new MRI.   MRI Head w/wo IV contrast (12/18/2021) shows 5 SMALL LESIONS AS DESCRIBED, 2 OF WHICH ARE STABLE, 2 WHICH HAVE MILDLY INCREASED IN SIZE, AND ONE WHICH APPEARS NEW. FINDINGS SUGGEST MILD INTERVAL PROGRESSION OF PATIENT'S KNOWN METASTATIC DISEASE. RECOMMEND CLINICAL CORRELATION AND INTERVAL FOLLOW-UP. Today denies denies complaints.   1/19/2022: Pt presents for follow-up. MRI 1/19/22 shows further slight progression of the cerebellar lesion while other lesions remain stable.    3/16/2022: Pt presents for PTE. Completed Linac SRS 2000 cgy/1fx to L cerebellum on 1/25/22. Reports feeling well overall. Having occasional HA (3/10, no medication). Notes mild pain and weakness in L shoulder, but denies general muscle weakness. Continues to have numbness in hands following secondary to chemo. Currently receiving Herceptin, Perjeta, and Anastrazole with Dr. Cohen.  4/14/2022- Ms. Cardozo presents today for PTE. Her MRI was done on 4/13, there is no final MRI but appears to be a good response to the left cerebellar lesion. Continues on kanjinti and perjeta.  CT CAP 3/26/2022 showed Stable examination. Sclerotic densities in bone which have not significantly changed.  Today she feels well. notes slight intermittent headaches, unchanged recently. no new focal weakness. notes a bald spot to her posterior head  7/13/2022 - Ms. Cardozo presents today for follow up.  Continues to follow with Dr. Cohen. Continues on herceptin and xgeva.  Brain MRI done 7/10/2022.  Body imaging 3/2022 stable.  10/26/2022- Ms. Cardozo presents today for follow up.  CT CAP 9/7/2022 showed Stable examination. Sclerotic densities in bone which have not significantly changed.  MRI brain 10/21/2022 showed New punctate focus of enhancement within the right caudate nucleus. Enhancing left insular nodule appears minimally larger measuring 6 x 5 mm, previously measured 5 x 4 mm. Slight increased enhancement associated with the anterior right frontal FLAIR signal abnormality.  Enhancing nodule in the left temporal region appears essentially stable. New linear focus of enhancement just medial to the lesion may be vascular in nature. Stable signal abnormality with vague enhancement anteromedial aspect of the right temporal lobe.  Findings suggest mild disease progression. Recommend clinical correlation and close interval follow-up.  continues on herceptin/perjeta.  Feeling well today. no headaches, no nausea, no focal weakness or confusion or dizziness.  1/25/2023- Ms. Cardozo presents today for follow up. Seen through TELEPHONE for which she consents on 1/25/2023 at 3:10 PM. MRI brain done 12/21/2022 showed Mild interval increase in size of the small focus of enhancement within the right caudate body currently measuring 3 mm, previously 2 mm. Other areas of signal abnormality and enhancement are stable. Please see report for description.  No new lesions seen.  Continues to follow with Dr. Cohen. Continues on Herceptin and Perjeta every 3 weeks. No new body imaging since September 2022, which was stable. Feeling well overall no headaches, no nausea, no focal weakness, no confusion.  3/9/2023 She presents for follow up. At last visit, repeat MRI ordered 2 months from last MRI for concerning increase in size of the right caudate lesion.  2/25/23 Brain MRI IMPRESSION: No cystic change compared with 12/21/2022. 1. Stable bilateral supratentorial enhancing nodules, as described in detail above, compatible with the provided history of metastatic disease. 2. No new lesions visualized.  3/2/2023 CT C/A/P showed stable exam  Today she reports headaches relived with Tylenol or Advil. Today she reported pain to left leg while walking into the building(likely claudication) encouraged to follow up with PCP.  Visit dated 9/19/2023 patient returns for f/u with images for review. Reports intermittent HAs, occasional "I feel I would pass out and double vision also a spinning sensation of the room" about three weeks ago. Today she is w/o those symptoms. Continues to follow with Dr. Cohen on Herceptin/Perjeta/ Anastrozole Xgeva next dose 1/2024  MRI brain ww/o contrast 9/8/2023 IMPRESSION: No hydrocephalus or new enhancing lesions. 5 parenchymal lesions are identified which appear similar to the prior exam of 2/25/2023. Right caudate head lesion is slightly larger measuring 4.8 mm compared with the prior of 3.2 mm.  CT C/A/P 9/1/82023 IMPRESSION: Stable examination as compared to CT 3/2/2023. Sclerotic osseous lesions, without significant change.  Visit dated 11/29/2023   Patient returns for post treatment f/u and progress check after completion of GKRS Right caudate lesion 9/25/2023 2000cgy over 1 Fx. Reports doing well post treatment. Endorses intermittent HAs Denies N/V, unilateral extremity weakness/memory changes/gait disturbance/bowel/bladder dysfunction or other neurologic symptoms. No issues with speech or comprehension. Persistent knee pain for which she gets injections and is currently in PT  Continues to follow with Dr. Cohen on Herceptin/Perjeta every 3 weeks.   Visit dated 1/10/2024   Patient returns for routine follow up to include progress check and review of completed cranial images. Denies N/V, HA/unilateral extremity weakness/memory changes/gait disturbance/bowel/bladder dysfunction or other neurologic symptoms. No issues with speech or comprehension. SHe is w/o any new verbalized concerns at today's visit.  Continues to follow with Dr. Cohen on Herceptin /Perjeta every 3 weeks since 8/2021 MRI brain w w/o contrast 1/8/2024 - stable on my review, pending read  VISIT DATED: 9/17/2024 Patient returns for routine f/u and progress check with cranial images for review. Reports doing well from a neurological standpoint. Does note bilateral leg edema per patient workup thus far negative. Plans to see vascular on 9/20/2024 for further workup. Leg edema does result in difficulty with extended.  Continues to follow with Dr. Cohen on Kanjinti/Perjeta every 3 weeks since 8/2021.  Also, on Anastrozole Last CT C/A/P w w/o contrast 6/20/2024 IMPRESSION: Stable examination. Sclerotic osseous lesions, unchanged.  MRI brain w w/o contrast 9/16/2024 no final read available at this time  VISIT DATED: 12/18/2024 Patient presents for routine f/u and progress check with cranial images for review. Reports she continues to do well. Does note neuropathy of the hands/ feet. Uses a cane for safety. Intermittent headaches. She is w/o any new concerns today. Continues to follow with Dr. Cohen on Herceptin /Perjeta every 3 weeks since 8/2021 with recent CT C/A/P 12/7/2024 w/o any new findings.  MRI brain w w/o contrast 12/16/2024 IMPRESSION: Multiple parenchymal lesions compatible with metastatic disease. Anterior left insular lesion (16-80) appears stable measuring 6 x 3 mm previously 6 x 3 mm. Punctate anterior right frontal cortical lesion (16-30) in retrospect appears stable measuring 2 mm previously 2 mm. Punctate medial right occipital lesion (16-90) appears stable measuring 2 mm. Linear left temporal lesion (16-71) appears stable measuring 12 x 3 mm. Previously described punctate left frontal subcortical lesion no longer seen. No new lesions identified  VISIT DATED: 3/12/2025 Ms. Cardozo presents for routine f/u and progress check with cranial images for review. States neurologically doing well but notes generalized aches/pain for which she uses OTC Tylenol.  Continues to follow with Dr. Cohen on Herceptin /Perjeta every 3 weeks since 8/2021.Also follows with Dr. Whitehead to monitor for Herceptin induced cardiotoxicity which changes made to her medication regimen to include a diuretics.  MRI brain w w/o contrast 3/7/2025 IMPRESSION: Stable small parenchymal lesions. No new lesions identified

## 2025-03-13 NOTE — PHYSICAL EXAM
[Obese] : obese [] : no respiratory distress [Heart Rate And Rhythm] : heart rate and rhythm were normal [Oriented To Time, Place, And Person] : oriented to person, place, and time [de-identified] : uses cane for stability

## 2025-03-13 NOTE — REVIEW OF SYSTEMS
[Muscle Pain] : muscle pain [Muscle Weakness] : muscle weakness [Dizziness] : dizziness [Negative] : Genitourinary [Cognitive Disturbance: Grade 0] : Cognitive Disturbance: Grade 0 [Concentration Impairment: Grade 0] : Concentration Impairment: Grade 0 [Dizziness: Grade 0] : Dizziness: Grade 0  [Facial Muscle Weakness: Grade 0] : Facial Muscle Weakness: Grade 0 [Headache: Grade 1 - Mild pain] : Headache: Grade 1 - Mild pain [Lethargy: Grade 0] : Lethargy: Grade 0 [Meningismus: Grade 0] : Meningismus: Grade 0 [Peripheral Motor Neuropathy: Grade 0] : Peripheral Motor Neuropathy: Grade 0 [Peripheral Sensory Neuropathy: Grade 1 - Asymptomatic; loss of deep tendon reflexes or paresthesia] : Peripheral Sensory Neuropathy: Grade 1 - Asymptomatic; loss of deep tendon reflexes or paresthesia [Somnolence: Grade 0] : Somnolence: Grade 0 [Confused] : no confusion [FreeTextEntry9] : has pain "all over the place." uses cane for safety LEFT knee pain impairs walking [de-identified] : Intermittent HAs, + neuropathy hands/feet

## 2025-03-13 NOTE — PHYSICAL EXAM
[Obese] : obese [] : no respiratory distress [Heart Rate And Rhythm] : heart rate and rhythm were normal [Oriented To Time, Place, And Person] : oriented to person, place, and time [de-identified] : uses cane for stability

## 2025-04-14 NOTE — REVIEW OF SYSTEMS
[Lower Ext Edema] : lower extremity edema [Under Stress] : under stress [FreeTextEntry2] : weight stable [FreeTextEntry6] : No shortness of breath, no difficulties breathing at night [FreeTextEntry8] : No urinary symptoms reported [FreeTextEntry9] : Chronic leg swelling, heaviness in legs [de-identified] : history of brain radiation (gamma knife) for breast cancer

## 2025-04-14 NOTE — REVIEW OF SYSTEMS
[Lower Ext Edema] : lower extremity edema [Under Stress] : under stress [FreeTextEntry2] : weight stable [FreeTextEntry6] : No shortness of breath, no difficulties breathing at night [FreeTextEntry8] : No urinary symptoms reported [FreeTextEntry9] : Chronic leg swelling, heaviness in legs [de-identified] : history of brain radiation (gamma knife) for breast cancer

## 2025-04-14 NOTE — PHYSICAL EXAM
[Well Developed] : well developed [Well Nourished] : well nourished [No Acute Distress] : no acute distress [Normal Venous Pressure] : normal venous pressure [Normal S1, S2] : normal S1, S2 [No Murmur] : no murmur [No Gallop] : no gallop [Clear Lung Fields] : clear lung fields [Good Air Entry] : good air entry [No Respiratory Distress] : no respiratory distress  [Normal Gait] : normal gait [Venous stasis] : venous stasis [Normal Speech] : normal speech [Alert and Oriented] : alert and oriented [Venous varicosities] : venous varicosities [de-identified] : irregular bradycardia [de-identified] : degenerative joint deformity of metatarsals  [de-identified] : trace bilateral lower extremity edema, faint livedo pattern

## 2025-04-14 NOTE — REASON FOR VISIT
[FreeTextEntry3] : Dr. Cohen [FreeTextEntry1] : ====================================================== MONSERRAT KENT is a 74 year old woman with a history of ER+/HER2 positive breast cancer, hypertension, type 2 diabetes, seen for follow up of lower extremity edema and elevated blood pressure.  Prior Cancer Treatments: ------------------------------------------------------------------------ Chemo/targeted therapy: 7/10/2017: THP --> HP 1/2018: anastrozole 6/2021-8/2021: tucatinib/capecitabine 8/2021-present: Sesar + pertuzumab ------------------------------------------------------------------------ Surgery: ------------------------------------------------------------------------ Radiation: 5/30/2017-2022: gamma knife for multiple brain metastases

## 2025-04-14 NOTE — PHYSICAL EXAM
[Well Developed] : well developed [Well Nourished] : well nourished [No Acute Distress] : no acute distress [Normal Venous Pressure] : normal venous pressure [Normal S1, S2] : normal S1, S2 [No Murmur] : no murmur [No Gallop] : no gallop [Clear Lung Fields] : clear lung fields [Good Air Entry] : good air entry [No Respiratory Distress] : no respiratory distress  [Normal Gait] : normal gait [Venous stasis] : venous stasis [Normal Speech] : normal speech [Alert and Oriented] : alert and oriented [Venous varicosities] : venous varicosities [de-identified] : irregular bradycardia [de-identified] : degenerative joint deformity of metatarsals  [de-identified] : trace bilateral lower extremity edema, faint livedo pattern

## 2025-04-14 NOTE — HISTORY OF PRESENT ILLNESS
[FreeTextEntry1] : Interval History: BP better after medication adjustment. Jardiance not covered by her insurance. Lower extremity edema persists. She has not followed up with the vascular yet. She continues to experience pain all over her body, using tylenol for this. Follow up echo performed in 2025 - normal LVEF and stable findings (grade 2 diastolic dysfunction). Last CMP 3/5/2025 after starting manuel/HCTZ: Cr 1.44 and K 5.3  No longer taking furosemide   History: The patient reports chronic bilateral leg swelling that has been ongoing for a long time and does not subside. Previously, the swelling would reduce by morning but now persists throughout the day. The leg swelling causes discomfort and heaviness.  She has no history of DVT. A duplex ultrasound examination of the lower extremity veins showed no evidence of DVT.   She has been treated with Kajinti and pertuzumab since  for metastatic breast cancer. The patient has been undergoing regular echocardiograms every three months during HER2 targeted therapy, with the most recent one in 2024 showing no significant abnormalities. Given increased edema and no recent echocardiogram, treatment was held pending cardiology evaluation. She reports not having chest pain, shortness of breath, or palpitations.  The patient also experiences elevated blood pressure readings during clinic visits, with readings sometimes reaching 180/187 mmHg. The patient reports taking medication for blood pressure, but fluctuations persist.   Past Medical History: - Hypertension - Diabetes mellitus type 2 - an SGLT-2 inhibitor was prescribed previously but copay cost was too high - Metastatic breast cancer (since ) - Hysterectomy - Knee repair  Social History: - Lives with son and  near [facility] - Retired, previously worked in cleaning - Non-smoker, no ETOH  2025: She continues to be affected by lower extremity edema. She was evaluated by Nephrology and referred to Vascular Medicine who recommended compression and prescribed a trial of furosemide. She is not using compression as she found it too tight. But states she will follow up. She was advised to stop HCTZ and start furosemide 20 mg daily in September. She reports taking two of the furosemide tablets helped the swelling a little. Echocardiogram from November showed normal LVEF and grade 2 diastolic dysfunction. Today, her blood pressure is elevated. She states she is out of her medications and requests refills.    Cardiovascular Summary: ---------------------------------------------- EC2025: sinus bradycardia 53 w PACs, LVH with repolarization abnormality 2025: sinus 65 bpm w sinus arrhythmia, LVH w repolarization abnormality 2024: sinus bradycardia with PACs, LVH with repolarization abnormality 2024: sinus bradycardia 54 bpm, LVH ---------------------------------------------- Echo: 3/25/2025: EF 61 %, GLS -17, grade 2 diastolic dysfunction, mod LAE, PASP 39, trace pericardial effusion  2024: EF 65 %, grade 2 diastolic dysfunction, LAE, GLS -20 (GE), concentric LVH, mild AS 2024: EF 68 %, GLS -17,2 (GE) 2024: EF 67 %, GLS -18.4, mod LAE, mild PHTN 23 LVEF 55% to 60% 4/10/2023 LVEF 61%  ---------------------------------------------- Stress: ---------------------------------------------- CT/MRI ---------------------------------------------- Remote/ambulatory rhythm monitoring:

## 2025-04-14 NOTE — ASSESSMENT
[FreeTextEntry1] : =============================================== 74 year old woman with metastatic HER2+ breast cancer diagnosed in 2017, longstanding type 2 diabetes and hypertension, venous insufficiency, diastolic dysfunction by echo, but pro-BNPs normal.  # Lower extremity edema: weight stable. Chest clear. Suspect due to diabetic nephropathy and venous insufficiency rather than heart failure, but the patient did note some improvement with trial of loop diuretic. - advised to follow up with Vascular to be measured for compression and for pneumatic pump info provided - spironolactone 25 mg daily + HCTZ 25 mg daily for HFpEF and HTN - furosemide DC'd - Jardiance 10 mg daily for diastolic dysfunction, CKD, and uncontrolled type 2 diabetes not covered - will try Farxiga (sent to [facility])  # Hypertension with labile readings at clinic visits: improved after med adjustment, though still above goal. - continue losartan 100 mg daily - continue HCTZ 25 mg daily (with spironolactone 25 mg daily added) - changed metoprolol to carvedilol 3.125 BID - continue this dose for now given bradycardia - continue BP monitoring - repeat labs today  # History of metastatic breast cancer with ongoing targeted therapy - continue surveillance echo with GLS during HER2 targeted therapy. - reasonable to space out to every 6 months - last echo, March 2025 - normal LVEF  # Type 2 diabetes, uncontrolled - would benenfit from SGLT2 for HFpEF, CKD - cont metformin 500 ER (higher dose caused diarrhea per patient) - also on glipizide and pioglitazone - discussed potential for hypoglycemia with SGLT2; she monitors her BG and will hold glipizide if low BG - needs follow up with PCP - repeat a1c  Follow up in 3 months with me  Above recommendations were discussed with the patient and all questions answered to the best of my ability and to her apparent satisfaction.

## 2025-04-14 NOTE — HISTORY OF PRESENT ILLNESS
[FreeTextEntry1] : Interval History: BP better after medication adjustment. Jardiance not covered by her insurance. Lower extremity edema persists. She has not followed up with the vascular yet. She continues to experience pain all over her body, using tylenol for this. Follow up echo performed in 2025 - normal LVEF and stable findings (grade 2 diastolic dysfunction). Last CMP 3/5/2025 after starting manuel/HCTZ: Cr 1.44 and K 5.3  No longer taking furosemide   History: The patient reports chronic bilateral leg swelling that has been ongoing for a long time and does not subside. Previously, the swelling would reduce by morning but now persists throughout the day. The leg swelling causes discomfort and heaviness.  She has no history of DVT. A duplex ultrasound examination of the lower extremity veins showed no evidence of DVT.   She has been treated with Kajinti and pertuzumab since  for metastatic breast cancer. The patient has been undergoing regular echocardiograms every three months during HER2 targeted therapy, with the most recent one in 2024 showing no significant abnormalities. Given increased edema and no recent echocardiogram, treatment was held pending cardiology evaluation. She reports not having chest pain, shortness of breath, or palpitations.  The patient also experiences elevated blood pressure readings during clinic visits, with readings sometimes reaching 180/187 mmHg. The patient reports taking medication for blood pressure, but fluctuations persist.   Past Medical History: - Hypertension - Diabetes mellitus type 2 - an SGLT-2 inhibitor was prescribed previously but copay cost was too high - Metastatic breast cancer (since ) - Hysterectomy - Knee repair  Social History: - Lives with son and  near [facility] - Retired, previously worked in cleaning - Non-smoker, no ETOH  2025: She continues to be affected by lower extremity edema. She was evaluated by Nephrology and referred to Vascular Medicine who recommended compression and prescribed a trial of furosemide. She is not using compression as she found it too tight. But states she will follow up. She was advised to stop HCTZ and start furosemide 20 mg daily in September. She reports taking two of the furosemide tablets helped the swelling a little. Echocardiogram from November showed normal LVEF and grade 2 diastolic dysfunction. Today, her blood pressure is elevated. She states she is out of her medications and requests refills.    Cardiovascular Summary: ---------------------------------------------- EC2025: sinus bradycardia 53 w PACs, LVH with repolarization abnormality 2025: sinus 65 bpm w sinus arrhythmia, LVH w repolarization abnormality 2024: sinus bradycardia with PACs, LVH with repolarization abnormality 2024: sinus bradycardia 54 bpm, LVH ---------------------------------------------- Echo: 3/25/2025: EF 61 %, GLS -17, grade 2 diastolic dysfunction, mod LAE, PASP 39, trace pericardial effusion  2024: EF 65 %, grade 2 diastolic dysfunction, LAE, GLS -20 (GE), concentric LVH, mild AS 2024: EF 68 %, GLS -17,2 (GE) 2024: EF 67 %, GLS -18.4, mod LAE, mild PHTN 23 LVEF 55% to 60% 4/10/2023 LVEF 61%  ---------------------------------------------- Stress: ---------------------------------------------- CT/MRI ---------------------------------------------- Remote/ambulatory rhythm monitoring:

## 2025-04-16 NOTE — ASSESSMENT
[Palliative] : Goals of care discussed with patient: Palliative [FreeTextEntry1] : This is a 75-year-old very pleasant  lady, with medical history significant for diabetes, dyslipidemia, hypertension and hypothyroidism, who is diagnosed with metastatic stage IV ER positive, FL negative, HER-2/douglas positive breast cancer. She presented with symptomatic brain metastasis and completed gamma knife radiosurgery to the brain metastases. She also has bone metastases, lymph node metastasis and peritoneal metastasis. She started THP on 7/10/17. Excellent response to chemo clinically, inflammatory changes resolved, CT scans after 4 cycles showed excellent response except one new sclerotic focus on L3 which was radiated. PET 3/2018 showed good response, Brain MRI shows new small lesion. She is on Arimidex + Herceptin, Perjeta. Progression of disease in the brain, started Xeloda Herceptin and tucatinib 6/2021.  METASTATIC BREAST CANCER WITH BRAIN METASTASES: Progression of disease in the brain, started Xeloda Herceptin and Tucatinib 6/2021. MRI 8/2021 showed response but pt could not tolerate XHT regimen despite dose reduction. HP restarted 8/2021. Patient is tolerating Anastrozole, Herceptin & Perjeta.  4/16/2025 Patient is here today for Kanjinti/Perjecta- Q3 weeks. Will repeat imaging studies in 4-6 months. Last CT C/A/P completed 7 December 2025; SD noted. Plan to continue HP & Anastrozole until POD. Will order imaging at follow-up visit in May 2025. Patient denies any Cardiopulmonary s/s or C/N/V/D. She reports she is eating well, with no changes in appetite and no weight change.   BRAIN METASTASES: MRI 9/2023 POD- s/p SRS MRI 12/2024 SD MRI 3/2024 SD Patient is UTD with Dr. Lundberg; last seen on 13 March 2025. Patient denies any neurologic s/s to include: nausea, vomiting, diplopia, blurry vision, unsteady gait or headaches. MR Head last completed 7 March 2025; stable disease with no new lesions identified. Patient to undergo repeat testing in 3 months and follow-up accordingly with Dr. Lundberg.  HTN & RISK FOR CARDIOTOXICITY: Ms. Cardozo is UTD with Cardiology. Patient saw Dr. Holman last on 14 April 2025. Patient has longstanding DM2 & HTN, as well as venous insufficiency, diastolic dysfunction (noted on ECHO) with normal PRO-BNP's. LE edema continues to be persistent (+2 on PE today), with some improvement with trial of loop diuretic per patient. Patient was encouraged to follow-up with Vascular so she can be evaluated/measured for compression garments. BP remains slightly elevated, but improved with medication adjustments. Patient is currently taking Losartan 100 mg daily, HCTZ 25 mg daily with 25 mg of Spironolactone PRN, and Carvedilol 3.125 BID. Last ECHO was completed on 25 March 2025 with a LVEF of 60%. Repeat imaging Q3 months; imaging ordered/managed by Cardiology.   BONE METASTASES: She is on XGEVA Q6M. She reports she saw dental 4/2021 and exam was good. Last XGEVA given: 1/2025 Patient reported her pain today (4/25/25) was a 10/10 concentrating in her back and knees. Discussed adding in breakthrough medication to supplement OTC Tylenol with Oxycodone, 5mg every 6 hours. Patient was relieved and wished for prescription to be sent to [facility] on file. 2 mg IV Morphine given in treatment room. Pain relieved with x 1 dose to 2/10.   PERITONEAL METASTASES: No ascites on PE. Will continue to monitor.  DIABETES: Patient established care with Dr. Suárez (Nephrology) for CKD. At initial consult, it was thought that the LE edema was unlikely related to her kidney disease. Patient was encouraged to discontinue use of NSAIDS and follow-up in 4-6 months. Since initial evaluation, Creatinine up trending to 1.76 on 14 April 2025. Encouraged patient to follow-up with Nephrology at this time.  - Elevated tumor markers: stable with minor fluctuations noted. Trend q 2 months  Continue Grisel & Hema Q3 weeks XGEVA Q6 months; next due July 2025  RTC 3 weeks

## 2025-04-16 NOTE — HISTORY OF PRESENT ILLNESS
[Patient Refusal] : Patient refused psychosocial distress assessment [ECOG Performance Status: 1 - Restricted in physically strenuous activity but ambulatory and able to carry out work of a light or sedentary nature] : Performance Status: 1 - Restricted in physically strenuous activity but ambulatory and able to carry out work of a light or sedentary nature, e.g., light house work, office work [IV] : IV [de-identified] : Ms. Cardozo is a 74-year-old lady who was recently diagnosed with HER-2 positive breast cancer with brain metastasis. She presented today for  an evaluation and to start chemotherapy. Her oncologic history is as follows:  She presented to  [facility] emergency room on 17 with complaints of aphasia for 2 weeks.  A CT head showed new lesion with mixed attenuation in the left temporofrontal region. MRI had done on 17 showed multiple enhancing parenchymal lesion at the gray-white junction with surrounding vasogenic edema suspicious for metastatic disease. No leptomeningeal enhancement was noted. She also underwent a CT scan of chest abdomen and pelvis on the same day which showed right axillary and pectoral lymphadenopathy, irregular mass in the right breast, 1.5 cm celiac axis lymph node and an omental implant  suspicious for peritoneal carcinomatosis. She was started on Keppra by neurology.  She underwent ultrasound-guided biopsy of the right axillary lymphadenopathy on 5/3/17which showed adenocarcinoma of mammary origin, positive for breast markers. ER 95%, CA negative and HER-2/douglas 3+  She completed gamma knife radiation treatment for multiple brain metastases on 17 by Dr. Savage. She denies headaches, balance issues, seizures, change in vision, hearing difficulty, memory problems, localized motor weakness  or comprehension problems.  She went for a second opinion at [facility] and they made the same recommendation (THP)  She was seen in 2019 with c/o worsening cough, wheezing and HOLLAND. Chest CT didn't show pna or POD. She is using inhalers now and is feeling much better. Cough and wheezing resolved.  CT 2020- ? liver lesion, MRI recommended. 2/15/2020 MRI ABD Impression: No focal liver lesions are visualized   Patient complaint of worsening generalized body pain 2021.  CT scan 2020 showed stable disease.  She was sent for a PET/CT to evaluate bony disease. PET/CT 2021 images reviewed: She has mild increase in FDG activity in the thoracolumbar spine.  MRI 3/2021 showed arthritis, spinal stenosis and stable bone mets.  Since there is no clear evidence of progression, we will continue with Herceptin and Perjeta.  She will continue anastrozole.  She reports overall body pain has improved without intervention.  She was also given 2 weeks anastrozole break which did not help. Pain has since resolved. She doesn't take pain meds daily. Tylenol once or twice a week. Discussed ortho f/u and PT prn   2021 She has been doing very well with the current regimen.  She underwent a brain MRI 2021 which showed progression of CNS mets and possibility of leptomeningeal disease.  Patient is asymptomatic.  I sent her for CT chest abdomen pelvis and discussed results with her today.  CT scans essentially do not show any evidence of disease progression outside of CNS. Dr. Lundberg discussed the case with me and concern is that she has significant burden of CNS disease which is currently not being controlled with localized radiation treatments.  She received gamma knife to multiple areas in May 2017 and 2018.  Current progression is in some of the treated mets but some of them are new as well.  I discussed MRI brain and CT chest abdomen pelvis findings with the patient as well as her son on the phone.  I recommend to switch therapy to cover CNS disease as well. We reviewed that she has been treated with standard of care first-line regimen based on HUBERT study.  We reviewed results from HER 2 CLIMB study which showed benefit of adding tucatinib to Herceptin and Xeloda.  Study showed improvement in overall survival as well as progression free survival especially in patients with brain metastases.  Side effect of the regimen reviewed with the patient and son.  Patient has multiple medical comorbidities and is as such taking multiple oral medications.  She was very concerned about adding multiple pills to her daily regimen.  Her son agreed to maintain a pillbox and supervise the pill intake.    She will take:  Capecitabine 1500 mg twice daily 1 week on 1 week off. Tucatinib 300 mg twice a day is the recommended dose but given the concern about significant GI toxicity, I recommended she start with dose reduction.  She will take 1 pill twice daily.  She will also take Imodium 4 times a day and report if she has more than 3-4 bowel movements a day.  Use of antinausea, antidiarrheal and prophylactic use of udder cream to prevent hand-foot syndrome was reviewed with the patient as well. She will continue Herceptin every 3 weeks.  She will stop Perjeta and anastrozole.  Addendum: Pharmacy called me on 2021 and reported that patient did not want to start the regimen.  I presented the case in tumor board on 2021 and consensus opinion was to recommend change in treatment and to start XTH to treat CNS disease.  I discussed that with the patient and explained her to start.  We reviewed side effect profile again.  She will start with the lower dose and see me in 1 week.  Depending on tolerability, we will uptitrate to full dose.  She started treatment 6/15/2021. She was extremely concerned about side effects and multiple pills therefore she is started on a lower dose.  21: she reports no significant toxicity. She had mild fatigue but no nausea vomiting diarrhea mouth sores or hand-foot syndrome. She was taking Imodium as prescribed and reports mild constipation. This is her off week for Xeloda. I recommend to increase tucatinib dose 2 pills twice daily(full dose). I will see her next week. If she has no concerning side effects, we will increase Xeloda dose as well. Last week she took Xeloda 2 and 2 pills (1000 bid) due to concerns about toxicity. She is due for Herceptin next week. We will do blood work next week and titrate up the dose as tolerated.   21: She iS here with her  today. Getting Herceptin today. She is on full dose of tucatinib 2 pills twice daily. Reports that constipation is resolved. She had 3 episodes of semisolid bowel movements, improved with Imodium. She denies nausea or vomiting. No other concerning symptoms. I recommend to start Xeloda and increase the dose to 3 pills twice daily 1 week on 1 week off. She will call if any concerning symptoms specifically diarrhea. Change in dose was discussed with the patient and . Written instructions were provided as well. Appointment for 3 weeks Herceptin and echocardiogram was discussed. Addendum: Blood work from 2021 showed rising creatinine to 1.93. She has diabetes and hypertension. Patient did not report severe diarrhea but this rising creatinine is concerning. I recommend that she comes for IV fluid this week and we will repeat CMP. She will hold Xeloda in the meantime. hold and Iv luids.   21 Saw patient in tx room today she is here for IV fluids #3 this as she has ongoing loose stools and decreased appetite x 1 week. On 7/3/21 she was also noted to have hypocalcemia and elevated creatinine. Xeloda was stopped on 21 and patient was instructed to decrease Tucatinib to 1 pill BID. Since decreasing Tucatinib she has about 4 diarrhea episodes daily. Instructed to continue Imodium prn. She reports  cough productive of scant amount of yellow mucous x 4 days no fever mild SOB when going up the stairs but this is not new. No SOB with regular activity.    2021 Today she reports that she is compliant with pills.  She reports severe fatigue,  unable to do household chores, lower extremity swelling, loose watery diarrhea 2-3 times a day, maximum 4 times, taking Imodium 2-3 times a day.  She has nausea even with water, unable to eat much, poor taste.  She is taking antinausea without much relief.  GERD is severe since PPI was held (interaction with Xeloda), not much relieved with Pepcid. Patient was crying today and said she cannot do it anymore.  I recommend to stop Xeloda and tucatinib for now.  She can restart PPI.  She will be treated with Herceptin today.  We will draw routine labs.  Stat BMP showed that creatinine is stable. 3-month brain MRI follow-up is coming up soon.  We will reevaluate after brain MRI.  Options include either switching to Herceptin Perjeta and radiating the brain lesions or consider Enhertu as it does have some CNS penetration. We will hold off CT imaging for now due to slight bump in creatinine.  Echo ordered today   5/3/2023  Patient seen in tx room today s/p syncopal episode/fall 2022 2/2 hypoglycemia. She reports feeling dizzy prior to episode. She was seen in ER CT head 4/10/23: stable No evidence of acute cortical infarction or hemorrhage. No dizziness headache or other neuro symptoms presently. D/w Dr. Noel RICO to proceed with Kanjinti/perjeta today. Reports knee pain and will f/u PCP 23 ECHO 4/10/23 LVEF 61% No CP or SOB Patient had imaging done on 3/2023. Upon review of CT films, my interpretation is that patient has STABLE disease. I reviewed these findings with the patient   2024 Here for Kanjinti perjeta today No cp sob, no GI sx with HP  She is on Xgeva q 6 month and is noted to have mild hypocalcemia. Ca 8.3  Rec to continue Ca suppls 600 mg BID while on Xgeva  Will repeat CMP today.. BP high frequently, WNL today. Rec to see cardioonc. BS better now MRI 10/2022, 2023 stable. Fb Dr Lundberg. MRI 2023 showed POD, s/p SRS 2023, repeat MRI 2024 SD CT 2023 SD , 2024 SD She is on xgeva q6 m as she has been on it for > 4 yrs. Last dental exam 2021. Last Xgeva  23, 24. Next due 2024 ECHO Q3M 23 LVEF 55% to 60%, 10/2023, ECHo due 2024 She reports family issues, her  has worsening Parkinson's disease and 2 falls. son had hernia surgery  24: Patient is here today for Kanjinti/Perjecta- Q3 weeks.  Patient denies any SOB or C/N/V/D. She reports eating well, with no changes in appetite and no weight change.  Patient is on XGEVA, Q 6 months. Next dose due: 24. Patient denies any dental issues.  Patient reports significant pitting edema in her bilateral extremities x 2 months. Denies that it improves with elevation and can be painful. Office to r/o DVT. Bilateral Venous Duplex ordered, ASAP. Referral to Nephrology and Cardiology (appt: ) also placed. Ms. Cardozo reports she discussed this concern with her PCP at the last visit who instructed her to take 2 Hydrochlorothiazide pills daily to see if s/s resolved. Patient reports s/s have remained consistent. Creatinine has progressively increased over the last year.  Ms. Cardozo underwent her last ECHO on 24, next imaging: OVERDUE. Orders placed for ASAP Imaging. Results showin. Left ventricular cavity is normal. Left ventricular wall thickness is mildly increased. Left ventricular systolic function is normal with an ejection fraction of 67 % by Braden's method of disks. Last CT C/A/P: 14; Stable disease Ms. Cardozo reports her son has successfully healed from his hernia s/x . Unfortunately, her  continue to decline and will be entering a Rehabilitation Facility in [place]. Patient also request a letter to be written on behalf of her sister-in-law coming from [place] to be a caregiver to her spouse as she is unable to care for him given her treatment schedule and general overall health.  Treatment held 2/2 overdue ECHO and new s/s of b/l LE swelling (2+pitting) RTC:  2 months with treatment; patient to obtain ECHO and Cardiology clearance prior to next infusion. CT before next visit  [FreeTextEntry1] : Started THP on 7/10/17, on HP now\par  Anastrazole 1/2018\par  XTH 6/2021- 8/2021\par  HP 8/2021 [de-identified] : Ms. MONSERRAT CARDOZO is here for follow up visit for metastatic breast cancer. She completed THP 7/2017, HP and anastrazole. Progression of disease in the brain, started Xeloda Herceptin and tucatinib 6/2021. MRI 8/2021 showed response but pt could not tolerate XHT regimen despite dose reduction. HP restarted 8/2021 2/2025 Patient had imaging done on 12/2024 . Upon review of CT films, my interpretation is that patient has STABLE disease. I reviewed these findings with the patient Patient is here today for Kanjinti/Perjecta- Q3 weeks.  Patient denies any SOB or C/N/V/D. She reports eating well, with no changes in appetite and no weight change.  Patient is on XGEVA, Q 6 months.  Patient denies any dental issues.  Next DUE july 2025 Last ECHO: Ordered Patient established care with Dr. Holman on 24 June 2024. BP high, rec to f/u with him  Patient established care with Dr. Suárez (Nephrology) for CKD. Cr stable  Otherwise feeling well.  F/b Dr Lundberg for brain MRI and brain metastases   4/16/2025 Patient is here today for Kanjinti/Perjecta- Q3 weeks. Will repeat imaging studies in 4-6 months. Last CT C/A/P completed 7 December 2025; SD noted. Plan to continue HP & Anastrozole until POD. Will order imaging at follow-up visit in May 2025.  Patient denies any Cardiopulmonary s/s or C/N/V/D. She reports she is eating well, with no changes in appetite and no weight change. Patient reported her pain today was a 10/10 concentrating in her back and knees. Discussed adding in breakthrough medication to supplement OTC Tylenol with Oxycodone, 5mg every 6 hours. Patient was relieved and wished for prescription to be sent to [facility] on file. 2 mg IV Morphine given in treatment room. Pain relieved with x 1 dose to 2/10.  Ms. Cardozo is UTD with Cardiology. Patient saw Dr. Holman last on 14 April 2025. Patient has longstanding DM2 & HTN, as well as venous insufficiency, diastolic dysfunction (noted on ECHO) with normal PRO-BNP's. LE edema continues to be persistent (+2 on PE today), with some improvement with trial of loop diuretic per patient. Patient was encouraged to follow-up with Vascular so she can be evaluated/measured for compression garments. BP remains slightly elevated, but improved with medication adjustments. Patient is currently taking Losartan 100 mg daily, HCTZ 25 mg daily with 25 mg of Spironolactone PRN, and Carvedilol 3.125 BID. Last ECHO was completed on 25 March 2025 with a LVEF of 60%. Repeat imaging Q3 months; imaging ordered/managed by Cardiology.  Patient established care with Dr. Suárez (Nephrology) for CKD. At initial consult, it was thought that the LE edema was unlikely related to her kidney disease. Patient was encouraged to discontinue use of NSAIDS and follow-up in 4-6 months. Since initial evaluation, Creatinine up trending to 1.76 on 14 April 2025. Encouraged patient to follow-up with Nephrology at this time. Patient is UTD with Dr. Lundberg; last seen on 13 March 2025. Patient denies any neurologic s/s to include: nausea, vomiting, diplopia, blurry vision, unsteady gait or headaches. MR Head last completed 7 March 2025; stable disease with no new lesions identified. Patient to undergo repeat testing in 3 months and follow-up accordingly with Dr. Lundberg. RTC: with each infusion; Q3 weeks

## 2025-04-16 NOTE — PHYSICAL EXAM
[Restricted in physically strenuous activity but ambulatory and able to carry out work of a light or sedentary nature] : Status 1- Restricted in physically strenuous activity but ambulatory and able to carry out work of a light or sedentary nature, e.g., light house work, office work [Obese] : obese [Normal] : affect appropriate [de-identified] : port in left chest wall c/d/i , chronic ankle/pedal swelling noted  rt. >left.. Chronic stable B/L LE edema  [de-identified] : Right breast- right breast slightly bigger than left breast + skin thickening in the outer lower quadrant with vague fullness. cellulitis resolved. No LN palpable UNCHANGED [de-identified] : bilateral pitting edema+2

## 2025-04-16 NOTE — ASSESSMENT
[Palliative] : Goals of care discussed with patient: Palliative [FreeTextEntry1] : This is a 75-year-old very pleasant  lady, with medical history significant for diabetes, dyslipidemia, hypertension and hypothyroidism, who is diagnosed with metastatic stage IV ER positive, WA negative, HER-2/douglas positive breast cancer. She presented with symptomatic brain metastasis and completed gamma knife radiosurgery to the brain metastases. She also has bone metastases, lymph node metastasis and peritoneal metastasis. She started THP on 7/10/17. Excellent response to chemo clinically, inflammatory changes resolved, CT scans after 4 cycles showed excellent response except one new sclerotic focus on L3 which was radiated. PET 3/2018 showed good response, Brain MRI shows new small lesion. She is on Arimidex + Herceptin, Perjeta. Progression of disease in the brain, started Xeloda Herceptin and tucatinib 6/2021.  METASTATIC BREAST CANCER WITH BRAIN METASTASES: Progression of disease in the brain, started Xeloda Herceptin and Tucatinib 6/2021. MRI 8/2021 showed response but pt could not tolerate XHT regimen despite dose reduction. HP restarted 8/2021. Patient is tolerating Anastrozole, Herceptin & Perjeta.  4/16/2025 Patient is here today for Kanjinti/Perjecta- Q3 weeks. Will repeat imaging studies in 4-6 months. Last CT C/A/P completed 7 December 2025; SD noted. Plan to continue HP & Anastrozole until POD. Will order imaging at follow-up visit in May 2025. Patient denies any Cardiopulmonary s/s or C/N/V/D. She reports she is eating well, with no changes in appetite and no weight change.   BRAIN METASTASES: MRI 9/2023 POD- s/p SRS MRI 12/2024 SD MRI 3/2024 SD Patient is UTD with Dr. Lundberg; last seen on 13 March 2025. Patient denies any neurologic s/s to include: nausea, vomiting, diplopia, blurry vision, unsteady gait or headaches. MR Head last completed 7 March 2025; stable disease with no new lesions identified. Patient to undergo repeat testing in 3 months and follow-up accordingly with Dr. Lundberg.  HTN & RISK FOR CARDIOTOXICITY: Ms. Cardozo is UTD with Cardiology. Patient saw Dr. Holman last on 14 April 2025. Patient has longstanding DM2 & HTN, as well as venous insufficiency, diastolic dysfunction (noted on ECHO) with normal PRO-BNP's. LE edema continues to be persistent (+2 on PE today), with some improvement with trial of loop diuretic per patient. Patient was encouraged to follow-up with Vascular so she can be evaluated/measured for compression garments. BP remains slightly elevated, but improved with medication adjustments. Patient is currently taking Losartan 100 mg daily, HCTZ 25 mg daily with 25 mg of Spironolactone PRN, and Carvedilol 3.125 BID. Last ECHO was completed on 25 March 2025 with a LVEF of 60%. Repeat imaging Q3 months; imaging ordered/managed by Cardiology.   BONE METASTASES: She is on XGEVA Q6M. She reports she saw dental 4/2021 and exam was good. Last XGEVA given: 1/2025 Patient reported her pain today (4/25/25) was a 10/10 concentrating in her back and knees. Discussed adding in breakthrough medication to supplement OTC Tylenol with Oxycodone, 5mg every 6 hours. Patient was relieved and wished for prescription to be sent to [facility] on file. 2 mg IV Morphine given in treatment room. Pain relieved with x 1 dose to 2/10.   PERITONEAL METASTASES: No ascites on PE. Will continue to monitor.  DIABETES: Patient established care with Dr. Suárez (Nephrology) for CKD. At initial consult, it was thought that the LE edema was unlikely related to her kidney disease. Patient was encouraged to discontinue use of NSAIDS and follow-up in 4-6 months. Since initial evaluation, Creatinine up trending to 1.76 on 14 April 2025. Encouraged patient to follow-up with Nephrology at this time.  - Elevated tumor markers: stable with minor fluctuations noted. Trend q 2 months  Continue Grisel & Hema Q3 weeks XGEVA Q6 months; next due July 2025  RTC 3 weeks

## 2025-04-16 NOTE — HISTORY OF PRESENT ILLNESS
[Patient Refusal] : Patient refused psychosocial distress assessment [ECOG Performance Status: 1 - Restricted in physically strenuous activity but ambulatory and able to carry out work of a light or sedentary nature] : Performance Status: 1 - Restricted in physically strenuous activity but ambulatory and able to carry out work of a light or sedentary nature, e.g., light house work, office work [IV] : IV [de-identified] : Ms. Cardozo is a 74-year-old lady who was recently diagnosed with HER-2 positive breast cancer with brain metastasis. She presented today for  an evaluation and to start chemotherapy. Her oncologic history is as follows:  She presented to  [facility] emergency room on 17 with complaints of aphasia for 2 weeks.  A CT head showed new lesion with mixed attenuation in the left temporofrontal region. MRI had done on 17 showed multiple enhancing parenchymal lesion at the gray-white junction with surrounding vasogenic edema suspicious for metastatic disease. No leptomeningeal enhancement was noted. She also underwent a CT scan of chest abdomen and pelvis on the same day which showed right axillary and pectoral lymphadenopathy, irregular mass in the right breast, 1.5 cm celiac axis lymph node and an omental implant  suspicious for peritoneal carcinomatosis. She was started on Keppra by neurology.  She underwent ultrasound-guided biopsy of the right axillary lymphadenopathy on 5/3/17which showed adenocarcinoma of mammary origin, positive for breast markers. ER 95%, AR negative and HER-2/douglas 3+  She completed gamma knife radiation treatment for multiple brain metastases on 17 by Dr. Savage. She denies headaches, balance issues, seizures, change in vision, hearing difficulty, memory problems, localized motor weakness  or comprehension problems.  She went for a second opinion at [facility] and they made the same recommendation (THP)  She was seen in 2019 with c/o worsening cough, wheezing and HOLLAND. Chest CT didn't show pna or POD. She is using inhalers now and is feeling much better. Cough and wheezing resolved.  CT 2020- ? liver lesion, MRI recommended. 2/15/2020 MRI ABD Impression: No focal liver lesions are visualized   Patient complaint of worsening generalized body pain 2021.  CT scan 2020 showed stable disease.  She was sent for a PET/CT to evaluate bony disease. PET/CT 2021 images reviewed: She has mild increase in FDG activity in the thoracolumbar spine.  MRI 3/2021 showed arthritis, spinal stenosis and stable bone mets.  Since there is no clear evidence of progression, we will continue with Herceptin and Perjeta.  She will continue anastrozole.  She reports overall body pain has improved without intervention.  She was also given 2 weeks anastrozole break which did not help. Pain has since resolved. She doesn't take pain meds daily. Tylenol once or twice a week. Discussed ortho f/u and PT prn   2021 She has been doing very well with the current regimen.  She underwent a brain MRI 2021 which showed progression of CNS mets and possibility of leptomeningeal disease.  Patient is asymptomatic.  I sent her for CT chest abdomen pelvis and discussed results with her today.  CT scans essentially do not show any evidence of disease progression outside of CNS. Dr. Lundberg discussed the case with me and concern is that she has significant burden of CNS disease which is currently not being controlled with localized radiation treatments.  She received gamma knife to multiple areas in May 2017 and 2018.  Current progression is in some of the treated mets but some of them are new as well.  I discussed MRI brain and CT chest abdomen pelvis findings with the patient as well as her son on the phone.  I recommend to switch therapy to cover CNS disease as well. We reviewed that she has been treated with standard of care first-line regimen based on HUBERT study.  We reviewed results from HER 2 CLIMB study which showed benefit of adding tucatinib to Herceptin and Xeloda.  Study showed improvement in overall survival as well as progression free survival especially in patients with brain metastases.  Side effect of the regimen reviewed with the patient and son.  Patient has multiple medical comorbidities and is as such taking multiple oral medications.  She was very concerned about adding multiple pills to her daily regimen.  Her son agreed to maintain a pillbox and supervise the pill intake.    She will take:  Capecitabine 1500 mg twice daily 1 week on 1 week off. Tucatinib 300 mg twice a day is the recommended dose but given the concern about significant GI toxicity, I recommended she start with dose reduction.  She will take 1 pill twice daily.  She will also take Imodium 4 times a day and report if she has more than 3-4 bowel movements a day.  Use of antinausea, antidiarrheal and prophylactic use of udder cream to prevent hand-foot syndrome was reviewed with the patient as well. She will continue Herceptin every 3 weeks.  She will stop Perjeta and anastrozole.  Addendum: Pharmacy called me on 2021 and reported that patient did not want to start the regimen.  I presented the case in tumor board on 2021 and consensus opinion was to recommend change in treatment and to start XTH to treat CNS disease.  I discussed that with the patient and explained her to start.  We reviewed side effect profile again.  She will start with the lower dose and see me in 1 week.  Depending on tolerability, we will uptitrate to full dose.  She started treatment 6/15/2021. She was extremely concerned about side effects and multiple pills therefore she is started on a lower dose.  21: she reports no significant toxicity. She had mild fatigue but no nausea vomiting diarrhea mouth sores or hand-foot syndrome. She was taking Imodium as prescribed and reports mild constipation. This is her off week for Xeloda. I recommend to increase tucatinib dose 2 pills twice daily(full dose). I will see her next week. If she has no concerning side effects, we will increase Xeloda dose as well. Last week she took Xeloda 2 and 2 pills (1000 bid) due to concerns about toxicity. She is due for Herceptin next week. We will do blood work next week and titrate up the dose as tolerated.   21: She iS here with her  today. Getting Herceptin today. She is on full dose of tucatinib 2 pills twice daily. Reports that constipation is resolved. She had 3 episodes of semisolid bowel movements, improved with Imodium. She denies nausea or vomiting. No other concerning symptoms. I recommend to start Xeloda and increase the dose to 3 pills twice daily 1 week on 1 week off. She will call if any concerning symptoms specifically diarrhea. Change in dose was discussed with the patient and . Written instructions were provided as well. Appointment for 3 weeks Herceptin and echocardiogram was discussed. Addendum: Blood work from 2021 showed rising creatinine to 1.93. She has diabetes and hypertension. Patient did not report severe diarrhea but this rising creatinine is concerning. I recommend that she comes for IV fluid this week and we will repeat CMP. She will hold Xeloda in the meantime. hold and Iv luids.   21 Saw patient in tx room today she is here for IV fluids #3 this as she has ongoing loose stools and decreased appetite x 1 week. On 7/3/21 she was also noted to have hypocalcemia and elevated creatinine. Xeloda was stopped on 21 and patient was instructed to decrease Tucatinib to 1 pill BID. Since decreasing Tucatinib she has about 4 diarrhea episodes daily. Instructed to continue Imodium prn. She reports  cough productive of scant amount of yellow mucous x 4 days no fever mild SOB when going up the stairs but this is not new. No SOB with regular activity.    2021 Today she reports that she is compliant with pills.  She reports severe fatigue,  unable to do household chores, lower extremity swelling, loose watery diarrhea 2-3 times a day, maximum 4 times, taking Imodium 2-3 times a day.  She has nausea even with water, unable to eat much, poor taste.  She is taking antinausea without much relief.  GERD is severe since PPI was held (interaction with Xeloda), not much relieved with Pepcid. Patient was crying today and said she cannot do it anymore.  I recommend to stop Xeloda and tucatinib for now.  She can restart PPI.  She will be treated with Herceptin today.  We will draw routine labs.  Stat BMP showed that creatinine is stable. 3-month brain MRI follow-up is coming up soon.  We will reevaluate after brain MRI.  Options include either switching to Herceptin Perjeta and radiating the brain lesions or consider Enhertu as it does have some CNS penetration. We will hold off CT imaging for now due to slight bump in creatinine.  Echo ordered today   5/3/2023  Patient seen in tx room today s/p syncopal episode/fall 2022 2/2 hypoglycemia. She reports feeling dizzy prior to episode. She was seen in ER CT head 4/10/23: stable No evidence of acute cortical infarction or hemorrhage. No dizziness headache or other neuro symptoms presently. D/w Dr. Noel RICO to proceed with Kanjinti/perjeta today. Reports knee pain and will f/u PCP 23 ECHO 4/10/23 LVEF 61% No CP or SOB Patient had imaging done on 3/2023. Upon review of CT films, my interpretation is that patient has STABLE disease. I reviewed these findings with the patient   2024 Here for Kanjinti perjeta today No cp sob, no GI sx with HP  She is on Xgeva q 6 month and is noted to have mild hypocalcemia. Ca 8.3  Rec to continue Ca suppls 600 mg BID while on Xgeva  Will repeat CMP today.. BP high frequently, WNL today. Rec to see cardioonc. BS better now MRI 10/2022, 2023 stable. Fb Dr Lundberg. MRI 2023 showed POD, s/p SRS 2023, repeat MRI 2024 SD CT 2023 SD , 2024 SD She is on xgeva q6 m as she has been on it for > 4 yrs. Last dental exam 2021. Last Xgeva  23, 24. Next due 2024 ECHO Q3M 23 LVEF 55% to 60%, 10/2023, ECHo due 2024 She reports family issues, her  has worsening Parkinson's disease and 2 falls. son had hernia surgery  24: Patient is here today for Kanjinti/Perjecta- Q3 weeks.  Patient denies any SOB or C/N/V/D. She reports eating well, with no changes in appetite and no weight change.  Patient is on XGEVA, Q 6 months. Next dose due: 24. Patient denies any dental issues.  Patient reports significant pitting edema in her bilateral extremities x 2 months. Denies that it improves with elevation and can be painful. Office to r/o DVT. Bilateral Venous Duplex ordered, ASAP. Referral to Nephrology and Cardiology (appt: ) also placed. Ms. Cardozo reports she discussed this concern with her PCP at the last visit who instructed her to take 2 Hydrochlorothiazide pills daily to see if s/s resolved. Patient reports s/s have remained consistent. Creatinine has progressively increased over the last year.  Ms. Cardozo underwent her last ECHO on 24, next imaging: OVERDUE. Orders placed for ASAP Imaging. Results showin. Left ventricular cavity is normal. Left ventricular wall thickness is mildly increased. Left ventricular systolic function is normal with an ejection fraction of 67 % by Braden's method of disks. Last CT C/A/P: 14; Stable disease Ms. Cardozo reports her son has successfully healed from his hernia s/x . Unfortunately, her  continue to decline and will be entering a Rehabilitation Facility in [place]. Patient also request a letter to be written on behalf of her sister-in-law coming from [place] to be a caregiver to her spouse as she is unable to care for him given her treatment schedule and general overall health.  Treatment held 2/2 overdue ECHO and new s/s of b/l LE swelling (2+pitting) RTC:  2 months with treatment; patient to obtain ECHO and Cardiology clearance prior to next infusion. CT before next visit  [FreeTextEntry1] : Started THP on 7/10/17, on HP now\par  Anastrazole 1/2018\par  XTH 6/2021- 8/2021\par  HP 8/2021 [de-identified] : Ms. MONSERRAT CARDOZO is here for follow up visit for metastatic breast cancer. She completed THP 7/2017, HP and anastrazole. Progression of disease in the brain, started Xeloda Herceptin and tucatinib 6/2021. MRI 8/2021 showed response but pt could not tolerate XHT regimen despite dose reduction. HP restarted 8/2021 2/2025 Patient had imaging done on 12/2024 . Upon review of CT films, my interpretation is that patient has STABLE disease. I reviewed these findings with the patient Patient is here today for Kanjinti/Perjecta- Q3 weeks.  Patient denies any SOB or C/N/V/D. She reports eating well, with no changes in appetite and no weight change.  Patient is on XGEVA, Q 6 months.  Patient denies any dental issues.  Next DUE july 2025 Last ECHO: Ordered Patient established care with Dr. Holman on 24 June 2024. BP high, rec to f/u with him  Patient established care with Dr. Suárez (Nephrology) for CKD. Cr stable  Otherwise feeling well.  F/b Dr Lundberg for brain MRI and brain metastases   4/16/2025 Patient is here today for Kanjinti/Perjecta- Q3 weeks. Will repeat imaging studies in 4-6 months. Last CT C/A/P completed 7 December 2025; SD noted. Plan to continue HP & Anastrozole until POD. Will order imaging at follow-up visit in May 2025.  Patient denies any Cardiopulmonary s/s or C/N/V/D. She reports she is eating well, with no changes in appetite and no weight change. Patient reported her pain today was a 10/10 concentrating in her back and knees. Discussed adding in breakthrough medication to supplement OTC Tylenol with Oxycodone, 5mg every 6 hours. Patient was relieved and wished for prescription to be sent to [facility] on file. 2 mg IV Morphine given in treatment room. Pain relieved with x 1 dose to 2/10.  Ms. Cardozo is UTD with Cardiology. Patient saw Dr. Holman last on 14 April 2025. Patient has longstanding DM2 & HTN, as well as venous insufficiency, diastolic dysfunction (noted on ECHO) with normal PRO-BNP's. LE edema continues to be persistent (+2 on PE today), with some improvement with trial of loop diuretic per patient. Patient was encouraged to follow-up with Vascular so she can be evaluated/measured for compression garments. BP remains slightly elevated, but improved with medication adjustments. Patient is currently taking Losartan 100 mg daily, HCTZ 25 mg daily with 25 mg of Spironolactone PRN, and Carvedilol 3.125 BID. Last ECHO was completed on 25 March 2025 with a LVEF of 60%. Repeat imaging Q3 months; imaging ordered/managed by Cardiology.  Patient established care with Dr. Suárez (Nephrology) for CKD. At initial consult, it was thought that the LE edema was unlikely related to her kidney disease. Patient was encouraged to discontinue use of NSAIDS and follow-up in 4-6 months. Since initial evaluation, Creatinine up trending to 1.76 on 14 April 2025. Encouraged patient to follow-up with Nephrology at this time. Patient is UTD with Dr. Lundberg; last seen on 13 March 2025. Patient denies any neurologic s/s to include: nausea, vomiting, diplopia, blurry vision, unsteady gait or headaches. MR Head last completed 7 March 2025; stable disease with no new lesions identified. Patient to undergo repeat testing in 3 months and follow-up accordingly with Dr. Lundberg. RTC: with each infusion; Q3 weeks

## 2025-04-16 NOTE — PHYSICAL EXAM
[Restricted in physically strenuous activity but ambulatory and able to carry out work of a light or sedentary nature] : Status 1- Restricted in physically strenuous activity but ambulatory and able to carry out work of a light or sedentary nature, e.g., light house work, office work [Obese] : obese [Normal] : affect appropriate [de-identified] : port in left chest wall c/d/i , chronic ankle/pedal swelling noted  rt. >left.. Chronic stable B/L LE edema  [de-identified] : Right breast- right breast slightly bigger than left breast + skin thickening in the outer lower quadrant with vague fullness. cellulitis resolved. No LN palpable UNCHANGED [de-identified] : bilateral pitting edema+2

## 2025-04-16 NOTE — PHYSICAL EXAM
[Restricted in physically strenuous activity but ambulatory and able to carry out work of a light or sedentary nature] : Status 1- Restricted in physically strenuous activity but ambulatory and able to carry out work of a light or sedentary nature, e.g., light house work, office work [Obese] : obese [Normal] : affect appropriate [de-identified] : port in left chest wall c/d/i , chronic ankle/pedal swelling noted  rt. >left.. Chronic stable B/L LE edema  [de-identified] : Right breast- right breast slightly bigger than left breast + skin thickening in the outer lower quadrant with vague fullness. cellulitis resolved. No LN palpable UNCHANGED [de-identified] : bilateral pitting edema+2

## 2025-04-16 NOTE — ASSESSMENT
[Palliative] : Goals of care discussed with patient: Palliative [FreeTextEntry1] : This is a 75-year-old very pleasant  lady, with medical history significant for diabetes, dyslipidemia, hypertension and hypothyroidism, who is diagnosed with metastatic stage IV ER positive, OR negative, HER-2/douglas positive breast cancer. She presented with symptomatic brain metastasis and completed gamma knife radiosurgery to the brain metastases. She also has bone metastases, lymph node metastasis and peritoneal metastasis. She started THP on 7/10/17. Excellent response to chemo clinically, inflammatory changes resolved, CT scans after 4 cycles showed excellent response except one new sclerotic focus on L3 which was radiated. PET 3/2018 showed good response, Brain MRI shows new small lesion. She is on Arimidex + Herceptin, Perjeta. Progression of disease in the brain, started Xeloda Herceptin and tucatinib 6/2021.  METASTATIC BREAST CANCER WITH BRAIN METASTASES: Progression of disease in the brain, started Xeloda Herceptin and Tucatinib 6/2021. MRI 8/2021 showed response but pt could not tolerate XHT regimen despite dose reduction. HP restarted 8/2021. Patient is tolerating Anastrozole, Herceptin & Perjeta.  4/16/2025 Patient is here today for Kanjinti/Perjecta- Q3 weeks. Will repeat imaging studies in 4-6 months. Last CT C/A/P completed 7 December 2025; SD noted. Plan to continue HP & Anastrozole until POD. Will order imaging at follow-up visit in May 2025. Patient denies any Cardiopulmonary s/s or C/N/V/D. She reports she is eating well, with no changes in appetite and no weight change.   BRAIN METASTASES: MRI 9/2023 POD- s/p SRS MRI 12/2024 SD MRI 3/2024 SD Patient is UTD with Dr. Lundberg; last seen on 13 March 2025. Patient denies any neurologic s/s to include: nausea, vomiting, diplopia, blurry vision, unsteady gait or headaches. MR Head last completed 7 March 2025; stable disease with no new lesions identified. Patient to undergo repeat testing in 3 months and follow-up accordingly with Dr. Lundberg.  HTN & RISK FOR CARDIOTOXICITY: Ms. Cardozo is UTD with Cardiology. Patient saw Dr. Holman last on 14 April 2025. Patient has longstanding DM2 & HTN, as well as venous insufficiency, diastolic dysfunction (noted on ECHO) with normal PRO-BNP's. LE edema continues to be persistent (+2 on PE today), with some improvement with trial of loop diuretic per patient. Patient was encouraged to follow-up with Vascular so she can be evaluated/measured for compression garments. BP remains slightly elevated, but improved with medication adjustments. Patient is currently taking Losartan 100 mg daily, HCTZ 25 mg daily with 25 mg of Spironolactone PRN, and Carvedilol 3.125 BID. Last ECHO was completed on 25 March 2025 with a LVEF of 60%. Repeat imaging Q3 months; imaging ordered/managed by Cardiology.   BONE METASTASES: She is on XGEVA Q6M. She reports she saw dental 4/2021 and exam was good. Last XGEVA given: 1/2025 Patient reported her pain today (4/25/25) was a 10/10 concentrating in her back and knees. Discussed adding in breakthrough medication to supplement OTC Tylenol with Oxycodone, 5mg every 6 hours. Patient was relieved and wished for prescription to be sent to [facility] on file. 2 mg IV Morphine given in treatment room. Pain relieved with x 1 dose to 2/10.   PERITONEAL METASTASES: No ascites on PE. Will continue to monitor.  DIABETES: Patient established care with Dr. Suárez (Nephrology) for CKD. At initial consult, it was thought that the LE edema was unlikely related to her kidney disease. Patient was encouraged to discontinue use of NSAIDS and follow-up in 4-6 months. Since initial evaluation, Creatinine up trending to 1.76 on 14 April 2025. Encouraged patient to follow-up with Nephrology at this time.  - Elevated tumor markers: stable with minor fluctuations noted. Trend q 2 months  Continue Grisel & Hema Q3 weeks XGEVA Q6 months; next due July 2025  RTC 3 weeks

## 2025-04-16 NOTE — HISTORY OF PRESENT ILLNESS
[Patient Refusal] : Patient refused psychosocial distress assessment [ECOG Performance Status: 1 - Restricted in physically strenuous activity but ambulatory and able to carry out work of a light or sedentary nature] : Performance Status: 1 - Restricted in physically strenuous activity but ambulatory and able to carry out work of a light or sedentary nature, e.g., light house work, office work [IV] : IV [de-identified] : Ms. Cardozo is a 74-year-old lady who was recently diagnosed with HER-2 positive breast cancer with brain metastasis. She presented today for  an evaluation and to start chemotherapy. Her oncologic history is as follows:  She presented to  [facility] emergency room on 17 with complaints of aphasia for 2 weeks.  A CT head showed new lesion with mixed attenuation in the left temporofrontal region. MRI had done on 17 showed multiple enhancing parenchymal lesion at the gray-white junction with surrounding vasogenic edema suspicious for metastatic disease. No leptomeningeal enhancement was noted. She also underwent a CT scan of chest abdomen and pelvis on the same day which showed right axillary and pectoral lymphadenopathy, irregular mass in the right breast, 1.5 cm celiac axis lymph node and an omental implant  suspicious for peritoneal carcinomatosis. She was started on Keppra by neurology.  She underwent ultrasound-guided biopsy of the right axillary lymphadenopathy on 5/3/17which showed adenocarcinoma of mammary origin, positive for breast markers. ER 95%, CT negative and HER-2/douglas 3+  She completed gamma knife radiation treatment for multiple brain metastases on 17 by Dr. Savage. She denies headaches, balance issues, seizures, change in vision, hearing difficulty, memory problems, localized motor weakness  or comprehension problems.  She went for a second opinion at [facility] and they made the same recommendation (THP)  She was seen in 2019 with c/o worsening cough, wheezing and HOLLAND. Chest CT didn't show pna or POD. She is using inhalers now and is feeling much better. Cough and wheezing resolved.  CT 2020- ? liver lesion, MRI recommended. 2/15/2020 MRI ABD Impression: No focal liver lesions are visualized   Patient complaint of worsening generalized body pain 2021.  CT scan 2020 showed stable disease.  She was sent for a PET/CT to evaluate bony disease. PET/CT 2021 images reviewed: She has mild increase in FDG activity in the thoracolumbar spine.  MRI 3/2021 showed arthritis, spinal stenosis and stable bone mets.  Since there is no clear evidence of progression, we will continue with Herceptin and Perjeta.  She will continue anastrozole.  She reports overall body pain has improved without intervention.  She was also given 2 weeks anastrozole break which did not help. Pain has since resolved. She doesn't take pain meds daily. Tylenol once or twice a week. Discussed ortho f/u and PT prn   2021 She has been doing very well with the current regimen.  She underwent a brain MRI 2021 which showed progression of CNS mets and possibility of leptomeningeal disease.  Patient is asymptomatic.  I sent her for CT chest abdomen pelvis and discussed results with her today.  CT scans essentially do not show any evidence of disease progression outside of CNS. Dr. Lundberg discussed the case with me and concern is that she has significant burden of CNS disease which is currently not being controlled with localized radiation treatments.  She received gamma knife to multiple areas in May 2017 and 2018.  Current progression is in some of the treated mets but some of them are new as well.  I discussed MRI brain and CT chest abdomen pelvis findings with the patient as well as her son on the phone.  I recommend to switch therapy to cover CNS disease as well. We reviewed that she has been treated with standard of care first-line regimen based on HUBERT study.  We reviewed results from HER 2 CLIMB study which showed benefit of adding tucatinib to Herceptin and Xeloda.  Study showed improvement in overall survival as well as progression free survival especially in patients with brain metastases.  Side effect of the regimen reviewed with the patient and son.  Patient has multiple medical comorbidities and is as such taking multiple oral medications.  She was very concerned about adding multiple pills to her daily regimen.  Her son agreed to maintain a pillbox and supervise the pill intake.    She will take:  Capecitabine 1500 mg twice daily 1 week on 1 week off. Tucatinib 300 mg twice a day is the recommended dose but given the concern about significant GI toxicity, I recommended she start with dose reduction.  She will take 1 pill twice daily.  She will also take Imodium 4 times a day and report if she has more than 3-4 bowel movements a day.  Use of antinausea, antidiarrheal and prophylactic use of udder cream to prevent hand-foot syndrome was reviewed with the patient as well. She will continue Herceptin every 3 weeks.  She will stop Perjeta and anastrozole.  Addendum: Pharmacy called me on 2021 and reported that patient did not want to start the regimen.  I presented the case in tumor board on 2021 and consensus opinion was to recommend change in treatment and to start XTH to treat CNS disease.  I discussed that with the patient and explained her to start.  We reviewed side effect profile again.  She will start with the lower dose and see me in 1 week.  Depending on tolerability, we will uptitrate to full dose.  She started treatment 6/15/2021. She was extremely concerned about side effects and multiple pills therefore she is started on a lower dose.  21: she reports no significant toxicity. She had mild fatigue but no nausea vomiting diarrhea mouth sores or hand-foot syndrome. She was taking Imodium as prescribed and reports mild constipation. This is her off week for Xeloda. I recommend to increase tucatinib dose 2 pills twice daily(full dose). I will see her next week. If she has no concerning side effects, we will increase Xeloda dose as well. Last week she took Xeloda 2 and 2 pills (1000 bid) due to concerns about toxicity. She is due for Herceptin next week. We will do blood work next week and titrate up the dose as tolerated.   21: She iS here with her  today. Getting Herceptin today. She is on full dose of tucatinib 2 pills twice daily. Reports that constipation is resolved. She had 3 episodes of semisolid bowel movements, improved with Imodium. She denies nausea or vomiting. No other concerning symptoms. I recommend to start Xeloda and increase the dose to 3 pills twice daily 1 week on 1 week off. She will call if any concerning symptoms specifically diarrhea. Change in dose was discussed with the patient and . Written instructions were provided as well. Appointment for 3 weeks Herceptin and echocardiogram was discussed. Addendum: Blood work from 2021 showed rising creatinine to 1.93. She has diabetes and hypertension. Patient did not report severe diarrhea but this rising creatinine is concerning. I recommend that she comes for IV fluid this week and we will repeat CMP. She will hold Xeloda in the meantime. hold and Iv luids.   21 Saw patient in tx room today she is here for IV fluids #3 this as she has ongoing loose stools and decreased appetite x 1 week. On 7/3/21 she was also noted to have hypocalcemia and elevated creatinine. Xeloda was stopped on 21 and patient was instructed to decrease Tucatinib to 1 pill BID. Since decreasing Tucatinib she has about 4 diarrhea episodes daily. Instructed to continue Imodium prn. She reports  cough productive of scant amount of yellow mucous x 4 days no fever mild SOB when going up the stairs but this is not new. No SOB with regular activity.    2021 Today she reports that she is compliant with pills.  She reports severe fatigue,  unable to do household chores, lower extremity swelling, loose watery diarrhea 2-3 times a day, maximum 4 times, taking Imodium 2-3 times a day.  She has nausea even with water, unable to eat much, poor taste.  She is taking antinausea without much relief.  GERD is severe since PPI was held (interaction with Xeloda), not much relieved with Pepcid. Patient was crying today and said she cannot do it anymore.  I recommend to stop Xeloda and tucatinib for now.  She can restart PPI.  She will be treated with Herceptin today.  We will draw routine labs.  Stat BMP showed that creatinine is stable. 3-month brain MRI follow-up is coming up soon.  We will reevaluate after brain MRI.  Options include either switching to Herceptin Perjeta and radiating the brain lesions or consider Enhertu as it does have some CNS penetration. We will hold off CT imaging for now due to slight bump in creatinine.  Echo ordered today   5/3/2023  Patient seen in tx room today s/p syncopal episode/fall 2022 2/2 hypoglycemia. She reports feeling dizzy prior to episode. She was seen in ER CT head 4/10/23: stable No evidence of acute cortical infarction or hemorrhage. No dizziness headache or other neuro symptoms presently. D/w Dr. Noel RICO to proceed with Kanjinti/perjeta today. Reports knee pain and will f/u PCP 23 ECHO 4/10/23 LVEF 61% No CP or SOB Patient had imaging done on 3/2023. Upon review of CT films, my interpretation is that patient has STABLE disease. I reviewed these findings with the patient   2024 Here for Kanjinti perjeta today No cp sob, no GI sx with HP  She is on Xgeva q 6 month and is noted to have mild hypocalcemia. Ca 8.3  Rec to continue Ca suppls 600 mg BID while on Xgeva  Will repeat CMP today.. BP high frequently, WNL today. Rec to see cardioonc. BS better now MRI 10/2022, 2023 stable. Fb Dr Lundberg. MRI 2023 showed POD, s/p SRS 2023, repeat MRI 2024 SD CT 2023 SD , 2024 SD She is on xgeva q6 m as she has been on it for > 4 yrs. Last dental exam 2021. Last Xgeva  23, 24. Next due 2024 ECHO Q3M 23 LVEF 55% to 60%, 10/2023, ECHo due 2024 She reports family issues, her  has worsening Parkinson's disease and 2 falls. son had hernia surgery  24: Patient is here today for Kanjinti/Perjecta- Q3 weeks.  Patient denies any SOB or C/N/V/D. She reports eating well, with no changes in appetite and no weight change.  Patient is on XGEVA, Q 6 months. Next dose due: 24. Patient denies any dental issues.  Patient reports significant pitting edema in her bilateral extremities x 2 months. Denies that it improves with elevation and can be painful. Office to r/o DVT. Bilateral Venous Duplex ordered, ASAP. Referral to Nephrology and Cardiology (appt: ) also placed. Ms. Cardozo reports she discussed this concern with her PCP at the last visit who instructed her to take 2 Hydrochlorothiazide pills daily to see if s/s resolved. Patient reports s/s have remained consistent. Creatinine has progressively increased over the last year.  Ms. Cardozo underwent her last ECHO on 24, next imaging: OVERDUE. Orders placed for ASAP Imaging. Results showin. Left ventricular cavity is normal. Left ventricular wall thickness is mildly increased. Left ventricular systolic function is normal with an ejection fraction of 67 % by Braden's method of disks. Last CT C/A/P: 14; Stable disease Ms. Cardozo reports her son has successfully healed from his hernia s/x . Unfortunately, her  continue to decline and will be entering a Rehabilitation Facility in [place]. Patient also request a letter to be written on behalf of her sister-in-law coming from [place] to be a caregiver to her spouse as she is unable to care for him given her treatment schedule and general overall health.  Treatment held 2/2 overdue ECHO and new s/s of b/l LE swelling (2+pitting) RTC:  2 months with treatment; patient to obtain ECHO and Cardiology clearance prior to next infusion. CT before next visit  [FreeTextEntry1] : Started THP on 7/10/17, on HP now\par  Anastrazole 1/2018\par  XTH 6/2021- 8/2021\par  HP 8/2021 [de-identified] : Ms. MONSERRAT CARDOZO is here for follow up visit for metastatic breast cancer. She completed THP 7/2017, HP and anastrazole. Progression of disease in the brain, started Xeloda Herceptin and tucatinib 6/2021. MRI 8/2021 showed response but pt could not tolerate XHT regimen despite dose reduction. HP restarted 8/2021 2/2025 Patient had imaging done on 12/2024 . Upon review of CT films, my interpretation is that patient has STABLE disease. I reviewed these findings with the patient Patient is here today for Kanjinti/Perjecta- Q3 weeks.  Patient denies any SOB or C/N/V/D. She reports eating well, with no changes in appetite and no weight change.  Patient is on XGEVA, Q 6 months.  Patient denies any dental issues.  Next DUE july 2025 Last ECHO: Ordered Patient established care with Dr. Holman on 24 June 2024. BP high, rec to f/u with him  Patient established care with Dr. Suárez (Nephrology) for CKD. Cr stable  Otherwise feeling well.  F/b Dr Lundberg for brain MRI and brain metastases   4/16/2025 Patient is here today for Kanjinti/Perjecta- Q3 weeks. Will repeat imaging studies in 4-6 months. Last CT C/A/P completed 7 December 2025; SD noted. Plan to continue HP & Anastrozole until POD. Will order imaging at follow-up visit in May 2025.  Patient denies any Cardiopulmonary s/s or C/N/V/D. She reports she is eating well, with no changes in appetite and no weight change. Patient reported her pain today was a 10/10 concentrating in her back and knees. Discussed adding in breakthrough medication to supplement OTC Tylenol with Oxycodone, 5mg every 6 hours. Patient was relieved and wished for prescription to be sent to [facility] on file. 2 mg IV Morphine given in treatment room. Pain relieved with x 1 dose to 2/10.  Ms. Cardozo is UTD with Cardiology. Patient saw Dr. Holman last on 14 April 2025. Patient has longstanding DM2 & HTN, as well as venous insufficiency, diastolic dysfunction (noted on ECHO) with normal PRO-BNP's. LE edema continues to be persistent (+2 on PE today), with some improvement with trial of loop diuretic per patient. Patient was encouraged to follow-up with Vascular so she can be evaluated/measured for compression garments. BP remains slightly elevated, but improved with medication adjustments. Patient is currently taking Losartan 100 mg daily, HCTZ 25 mg daily with 25 mg of Spironolactone PRN, and Carvedilol 3.125 BID. Last ECHO was completed on 25 March 2025 with a LVEF of 60%. Repeat imaging Q3 months; imaging ordered/managed by Cardiology.  Patient established care with Dr. Suárez (Nephrology) for CKD. At initial consult, it was thought that the LE edema was unlikely related to her kidney disease. Patient was encouraged to discontinue use of NSAIDS and follow-up in 4-6 months. Since initial evaluation, Creatinine up trending to 1.76 on 14 April 2025. Encouraged patient to follow-up with Nephrology at this time. Patient is UTD with Dr. Lundberg; last seen on 13 March 2025. Patient denies any neurologic s/s to include: nausea, vomiting, diplopia, blurry vision, unsteady gait or headaches. MR Head last completed 7 March 2025; stable disease with no new lesions identified. Patient to undergo repeat testing in 3 months and follow-up accordingly with Dr. Lundberg. RTC: with each infusion; Q3 weeks

## 2025-05-05 NOTE — HISTORY OF PRESENT ILLNESS
[FreeTextEntry1] : leg swelling [de-identified] : 76 yo F with DM, breast ca, HTN, CKD, chronic leg edema presents for leg swelling. It has been more bothersome lately. On spironolactone-HCTZ. Not helping. Has been on furosemide in the past. Recent proBNP neg. Recent a1c 7.5. Pt has severe bunion and toe deformities. Does not have podiatrist. Has been keeping legs elevated. Has compression boots that she uses PRN.  Getting around has been tough. No help at home.  with Parkinson's disease. Son works overnights. Per patient, insurance doesn't cover home health aide or [id number]. Pt to reach out to social work at oncology office for help.

## 2025-05-13 NOTE — PHYSICAL EXAM
[Normal] : soft, non-tender, non-distended, no masses palpated, no HSM and normal bowel sounds [de-identified] : 1+ swelling

## 2025-05-13 NOTE — HISTORY OF PRESENT ILLNESS
[de-identified] : 76 yo F with breast ca, DM, CKD, HLD, HTN, OA presents for annual.  Pt reports leg swelling improved slightly with furosemide and spironolactone-HCTZ.  She will get Prevnar 21 at pharm if ok by oncology. Pt still doing chemo.

## 2025-05-13 NOTE — HEALTH RISK ASSESSMENT
[Fair] :  ~his/her~ mood as fair [No] : In the past 12 months have you used drugs other than those required for medical reasons? No [No falls in past year] : Patient reported no falls in the past year [0] : 2) Feeling down, depressed, or hopeless: Not at all (0) [PHQ-2 Negative - No further assessment needed] : PHQ-2 Negative - No further assessment needed [de-identified] : none [de-identified] : fine [[id number]] : 0 [Yes] : Reviewed medication list for presence of high-risk medications. [Benzodiazepines] : benzodiazepines [Opioids] : opioids [Blood Thinners] : blood thinners [Muscle Relaxants] : muscle relaxants [Sedatives] : sedatives [Never] : Never [NO] : No [No Retinopathy] : No retinopathy [EyeExamDate] : 01/25 [Patient reported bone density results were normal] : Patient reported bone density results were normal [Patient reported colonoscopy was normal] : Patient reported colonoscopy was normal [Language] : denies difficulty with language [Behavior] : denies difficulty with behavior [Learning/Retaining New Information] : denies difficulty learning/retaining new information [Handling Complex Tasks] : denies difficulty handling complex tasks [Reasoning] : denies difficulty with reasoning [Spatial Ability and Orientation] : denies difficulty with spatial ability and orientation [Fully functional (bathing, dressing, toileting, transferring, walking, feeding)] : Fully functional (bathing, dressing, toileting, transferring, walking, feeding) [Some assistance needed] : using transportation [MammogramComments] : follows with oncology [PapSmearComments] : s/p hysterectomy [BoneDensityDate] : 02/21 [BoneDensityComments] : normal [ColonoscopyDate] : 2021 [Patient/Caregiver not ready to engage] : , patient/caregiver not ready to engage [AdvancecareDate] : 5/25

## 2025-05-21 NOTE — PHYSICAL EXAM
[Ankle Swelling (On Exam)] : present [Ankle Swelling Bilaterally] : bilaterally  [Ankle Swelling On The Left] : moderate [0] : left foot posterior tibialis 0 [1+] : left foot dorsalis pedis 1+ [Pes Planus] : pes planus deformity [Skin Turgor] : normal skin turgor [Skin Lesions] : no skin lesions [No Focal Deficits] : no focal deficits [Deep Tendon Reflexes (DTR)] : deep tendon reflexes were 2+ and symmetric [Motor Exam] : the motor exam was normal [Vibration Dec.] : diminished vibratory sensation at the level of the toes [General Appearance - Alert] : alert [Oriented To Time, Place, And Person] : oriented to person, place, and time [Varicose Veins Of Lower Extremities] : not present [] : not present [Delayed in the Right Toes] : capillary refills normal in right toes [Delayed in the Left Toes] : capillary refills normal in the left toes [de-identified] : Dislocated 1st MPJ, 2nd, 3rd and 4th MPJ's with crossover deformity of the 2nd and 3rd toes with a hallux valgus.  There is a large intermetatarsal angle with bunion.  There is complete collapse of the medial, longitudinal arch and uncovering of the talar head.  Subtalar joint subluxation with a depressed calcaneal pitch as well as what appears to be subluxation of the midfoot.  Rigid hammertoes noted.  The deformities are non-reducible.   [Foot Ulcer] : no foot ulcer [Skin Induration] : no skin induration [Position Sense Dec.] : normal position sense at the level of the toes [FreeTextEntry1] : Q8 - The patient has class findings of Q8 - Two Class B findings.

## 2025-05-21 NOTE — PHYSICAL EXAM
[Ankle Swelling (On Exam)] : present [Ankle Swelling Bilaterally] : bilaterally  [Ankle Swelling On The Left] : moderate [0] : left foot posterior tibialis 0 [1+] : left foot dorsalis pedis 1+ [Pes Planus] : pes planus deformity [Skin Turgor] : normal skin turgor [Skin Lesions] : no skin lesions [No Focal Deficits] : no focal deficits [Deep Tendon Reflexes (DTR)] : deep tendon reflexes were 2+ and symmetric [Motor Exam] : the motor exam was normal [Vibration Dec.] : diminished vibratory sensation at the level of the toes [General Appearance - Alert] : alert [Oriented To Time, Place, And Person] : oriented to person, place, and time [Varicose Veins Of Lower Extremities] : not present [] : not present [Delayed in the Right Toes] : capillary refills normal in right toes [Delayed in the Left Toes] : capillary refills normal in the left toes [de-identified] : Dislocated 1st MPJ, 2nd, 3rd and 4th MPJ's with crossover deformity of the 2nd and 3rd toes with a hallux valgus.  There is a large intermetatarsal angle with bunion.  There is complete collapse of the medial, longitudinal arch and uncovering of the talar head.  Subtalar joint subluxation with a depressed calcaneal pitch as well as what appears to be subluxation of the midfoot.  Rigid hammertoes noted.  The deformities are non-reducible.   [Foot Ulcer] : no foot ulcer [Skin Induration] : no skin induration [Position Sense Dec.] : normal position sense at the level of the toes [FreeTextEntry1] : Q8 - The patient has class findings of Q8 - Two Class B findings.

## 2025-05-21 NOTE — PROCEDURE
[FreeTextEntry1] : X-rays were taken of the foot and ankle to assess them. (3 views - right ankle) The ankle has no valgus or varus. (3 views - right foot) There is subluxation of the midfoot at the talonavicular joint and subtalar joint.  Negative calcaneal pitch.  Abduction of the forefoot.  Uncovering of the talar head at the rearfoot.  Large HAV with bunion with large intermetatarsal angle.  Dislocated 1st MPJ, 2nd, 3rd and 4th MPJ's.  Hammertoe deformities.

## 2025-05-21 NOTE — CONSULT LETTER
[Dear  ___] : Dear  [unfilled], [Consult Letter:] : I had the pleasure of evaluating your patient, [unfilled]. [Please see my note below.] : Please see my note below. [Consult Closing:] : Thank you very much for allowing me to participate in the care of this patient.  If you have any questions, please do not hesitate to contact me. [Sincerely,] : Sincerely, [FreeTextEntry2] : Licha Willis MD [address] [place] [address] [address] [place] [address]  [FreeTextEntry3] : Charles M. Lombardi, DPM, FACFAS Systems Chief, Podiatric Services [facility] Assistant Professor of Surgery [employer] [facility] [employer] at [facility]

## 2025-05-21 NOTE — HISTORY OF PRESENT ILLNESS
[FreeTextEntry1] : Patient presents today with a chief complaint of a bunion deformity and crossover deformity of the right 2nd and 3rd toes.  Patient has difficulty ambulating due to pain and discomfort.  She has a collapse of the rearfoot as well with severe pes planus of the right foot that seems to be acquired.  The patient states that the foot has completely collapsed over time.   Patient is diabetic.  Her last A1c was 7.5%.

## 2025-05-21 NOTE — CONSULT LETTER
[Dear  ___] : Dear  [unfilled], [Consult Letter:] : I had the pleasure of evaluating your patient, [unfilled]. [Please see my note below.] : Please see my note below. [Consult Closing:] : Thank you very much for allowing me to participate in the care of this patient.  If you have any questions, please do not hesitate to contact me. [Sincerely,] : Sincerely, [FreeTextEntry2] : Licha Willis MD [address] [place] [address] [address] [place] [address]  [FreeTextEntry3] : Charles M. Lombardi, DPM, FACFAS Systems Chief, Podiatric Services [facility] Assistant Professor of Surgery [facility] [employer] at [facility]

## 2025-05-21 NOTE — CONSULT LETTER
[Dear  ___] : Dear  [unfilled], [Consult Letter:] : I had the pleasure of evaluating your patient, [unfilled]. [Please see my note below.] : Please see my note below. [Consult Closing:] : Thank you very much for allowing me to participate in the care of this patient.  If you have any questions, please do not hesitate to contact me. [Sincerely,] : Sincerely, [FreeTextEntry2] : Licha Willis MD [address] [place] [address] [address] [place] [address]  [FreeTextEntry3] : Charles M. Lombardi, DPM, FACFAS Systems Chief, Podiatric Services [facility] Assistant Professor of Surgery [facility] [employer] at [facility]

## 2025-05-21 NOTE — PHYSICAL EXAM
[Ankle Swelling (On Exam)] : present [Ankle Swelling Bilaterally] : bilaterally  [Ankle Swelling On The Left] : moderate [0] : left foot posterior tibialis 0 [1+] : left foot dorsalis pedis 1+ [Pes Planus] : pes planus deformity [Skin Turgor] : normal skin turgor [Skin Lesions] : no skin lesions [No Focal Deficits] : no focal deficits [Deep Tendon Reflexes (DTR)] : deep tendon reflexes were 2+ and symmetric [Motor Exam] : the motor exam was normal [Vibration Dec.] : diminished vibratory sensation at the level of the toes [General Appearance - Alert] : alert [Oriented To Time, Place, And Person] : oriented to person, place, and time [Varicose Veins Of Lower Extremities] : not present [] : not present [Delayed in the Right Toes] : capillary refills normal in right toes [Delayed in the Left Toes] : capillary refills normal in the left toes [de-identified] : Dislocated 1st MPJ, 2nd, 3rd and 4th MPJ's with crossover deformity of the 2nd and 3rd toes with a hallux valgus.  There is a large intermetatarsal angle with bunion.  There is complete collapse of the medial, longitudinal arch and uncovering of the talar head.  Subtalar joint subluxation with a depressed calcaneal pitch as well as what appears to be subluxation of the midfoot.  Rigid hammertoes noted.  The deformities are non-reducible.   [Foot Ulcer] : no foot ulcer [Skin Induration] : no skin induration [Position Sense Dec.] : normal position sense at the level of the toes [FreeTextEntry1] : Q8 - The patient has class findings of Q8 - Two Class B findings.

## 2025-05-21 NOTE — ASSESSMENT
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  Crossover deformities of the 2nd and 3rd toes, right (M20.5x1).  Hammertoes, right foot (M20.41).    The patient has multiple rearfoot and forefoot deformities.  The rearfoot deformities are not bothering the patient and I do not feel that she is a good candidate for a full reconstruction of the rearfoot.  She is unable to wear shoes because of the right foot deformity at the forefoot.   At this time, because her circulatory status is questionable, I want a complete vascular workup to see if she has the potential to heal for determining any potential surgical intervention.  If she does have circulatory status, I would then consider a pan metatarsal head resection, digital fusions of the 2nd, 3rd and 4th toes with a fusion of the 1st metatarsal phalangeal joint with plate.  This was explained to her as well as the need for a vascular workup.

## 2025-05-28 NOTE — HISTORY OF PRESENT ILLNESS
[Patient Refusal] : Patient refused psychosocial distress assessment [ECOG Performance Status: 1 - Restricted in physically strenuous activity but ambulatory and able to carry out work of a light or sedentary nature] : Performance Status: 1 - Restricted in physically strenuous activity but ambulatory and able to carry out work of a light or sedentary nature, e.g., light house work, office work [IV] : IV [de-identified] : Ms. aCrdozo is a 74-year-old lady who was recently diagnosed with HER-2 positive breast cancer with brain metastasis. She presented today for  an evaluation and to start chemotherapy. Her oncologic history is as follows:  She presented to  [facility] emergency room on 17 with complaints of aphasia for 2 weeks.  A CT head showed new lesion with mixed attenuation in the left temporofrontal region. MRI had done on 17 showed multiple enhancing parenchymal lesion at the gray-white junction with surrounding vasogenic edema suspicious for metastatic disease. No leptomeningeal enhancement was noted. She also underwent a CT scan of chest abdomen and pelvis on the same day which showed right axillary and pectoral lymphadenopathy, irregular mass in the right breast, 1.5 cm celiac axis lymph node and an omental implant  suspicious for peritoneal carcinomatosis. She was started on Keppra by neurology.  She underwent ultrasound-guided biopsy of the right axillary lymphadenopathy on 5/3/17which showed adenocarcinoma of mammary origin, positive for breast markers. ER 95%, VA negative and HER-2/douglas 3+  She completed gamma knife radiation treatment for multiple brain metastases on 17 by Dr. Savage. She denies headaches, balance issues, seizures, change in vision, hearing difficulty, memory problems, localized motor weakness  or comprehension problems.  She went for a second opinion at [facility] and they made the same recommendation (THP)  She was seen in 2019 with c/o worsening cough, wheezing and HOLLAND. Chest CT didn't show pna or POD. She is using inhalers now and is feeling much better. Cough and wheezing resolved.  CT 2020- ? liver lesion, MRI recommended. 2/15/2020 MRI ABD Impression: No focal liver lesions are visualized   Patient complaint of worsening generalized body pain 2021.  CT scan 2020 showed stable disease.  She was sent for a PET/CT to evaluate bony disease. PET/CT 2021 images reviewed: She has mild increase in FDG activity in the thoracolumbar spine.  MRI 3/2021 showed arthritis, spinal stenosis and stable bone mets.  Since there is no clear evidence of progression, we will continue with Herceptin and Perjeta.  She will continue anastrozole.  She reports overall body pain has improved without intervention.  She was also given 2 weeks anastrozole break which did not help. Pain has since resolved. She doesn't take pain meds daily. Tylenol once or twice a week. Discussed ortho f/u and PT prn   2021 She has been doing very well with the current regimen.  She underwent a brain MRI 2021 which showed progression of CNS mets and possibility of leptomeningeal disease.  Patient is asymptomatic.  I sent her for CT chest abdomen pelvis and discussed results with her today.  CT scans essentially do not show any evidence of disease progression outside of CNS. Dr. Lundberg discussed the case with me and concern is that she has significant burden of CNS disease which is currently not being controlled with localized radiation treatments.  She received gamma knife to multiple areas in May 2017 and 2018.  Current progression is in some of the treated mets but some of them are new as well.  I discussed MRI brain and CT chest abdomen pelvis findings with the patient as well as her son on the phone.  I recommend to switch therapy to cover CNS disease as well. We reviewed that she has been treated with standard of care first-line regimen based on HUBERT study.  We reviewed results from HER 2 CLIMB study which showed benefit of adding tucatinib to Herceptin and Xeloda.  Study showed improvement in overall survival as well as progression free survival especially in patients with brain metastases.  Side effect of the regimen reviewed with the patient and son.  Patient has multiple medical comorbidities and is as such taking multiple oral medications.  She was very concerned about adding multiple pills to her daily regimen.  Her son agreed to maintain a pillbox and supervise the pill intake.    She will take:  Capecitabine 1500 mg twice daily 1 week on 1 week off. Tucatinib 300 mg twice a day is the recommended dose but given the concern about significant GI toxicity, I recommended she start with dose reduction.  She will take 1 pill twice daily.  She will also take Imodium 4 times a day and report if she has more than 3-4 bowel movements a day.  Use of antinausea, antidiarrheal and prophylactic use of udder cream to prevent hand-foot syndrome was reviewed with the patient as well. She will continue Herceptin every 3 weeks.  She will stop Perjeta and anastrozole.  Addendum: Pharmacy called me on 2021 and reported that patient did not want to start the regimen.  I presented the case in tumor board on 2021 and consensus opinion was to recommend change in treatment and to start XTH to treat CNS disease.  I discussed that with the patient and explained her to start.  We reviewed side effect profile again.  She will start with the lower dose and see me in 1 week.  Depending on tolerability, we will uptitrate to full dose.  She started treatment 6/15/2021. She was extremely concerned about side effects and multiple pills therefore she is started on a lower dose.  21: she reports no significant toxicity. She had mild fatigue but no nausea vomiting diarrhea mouth sores or hand-foot syndrome. She was taking Imodium as prescribed and reports mild constipation. This is her off week for Xeloda. I recommend to increase tucatinib dose 2 pills twice daily(full dose). I will see her next week. If she has no concerning side effects, we will increase Xeloda dose as well. Last week she took Xeloda 2 and 2 pills (1000 bid) due to concerns about toxicity. She is due for Herceptin next week. We will do blood work next week and titrate up the dose as tolerated.   21: She iS here with her  today. Getting Herceptin today. She is on full dose of tucatinib 2 pills twice daily. Reports that constipation is resolved. She had 3 episodes of semisolid bowel movements, improved with Imodium. She denies nausea or vomiting. No other concerning symptoms. I recommend to start Xeloda and increase the dose to 3 pills twice daily 1 week on 1 week off. She will call if any concerning symptoms specifically diarrhea. Change in dose was discussed with the patient and . Written instructions were provided as well. Appointment for 3 weeks Herceptin and echocardiogram was discussed. Addendum: Blood work from 2021 showed rising creatinine to 1.93. She has diabetes and hypertension. Patient did not report severe diarrhea but this rising creatinine is concerning. I recommend that she comes for IV fluid this week and we will repeat CMP. She will hold Xeloda in the meantime. hold and Iv luids.   21 Saw patient in tx room today she is here for IV fluids #3 this as she has ongoing loose stools and decreased appetite x 1 week. On 7/3/21 she was also noted to have hypocalcemia and elevated creatinine. Xeloda was stopped on 21 and patient was instructed to decrease Tucatinib to 1 pill BID. Since decreasing Tucatinib she has about 4 diarrhea episodes daily. Instructed to continue Imodium prn. She reports  cough productive of scant amount of yellow mucous x 4 days no fever mild SOB when going up the stairs but this is not new. No SOB with regular activity.    2021 Today she reports that she is compliant with pills.  She reports severe fatigue,  unable to do household chores, lower extremity swelling, loose watery diarrhea 2-3 times a day, maximum 4 times, taking Imodium 2-3 times a day.  She has nausea even with water, unable to eat much, poor taste.  She is taking antinausea without much relief.  GERD is severe since PPI was held (interaction with Xeloda), not much relieved with Pepcid. Patient was crying today and said she cannot do it anymore.  I recommend to stop Xeloda and tucatinib for now.  She can restart PPI.  She will be treated with Herceptin today.  We will draw routine labs.  Stat BMP showed that creatinine is stable. 3-month brain MRI follow-up is coming up soon.  We will reevaluate after brain MRI.  Options include either switching to Herceptin Perjeta and radiating the brain lesions or consider Enhertu as it does have some CNS penetration. We will hold off CT imaging for now due to slight bump in creatinine.  Echo ordered today   5/3/2023  Patient seen in tx room today s/p syncopal episode/fall 2022 2/2 hypoglycemia. She reports feeling dizzy prior to episode. She was seen in ER CT head 4/10/23: stable No evidence of acute cortical infarction or hemorrhage. No dizziness headache or other neuro symptoms presently. D/w Dr. Noel RICO to proceed with Kanjinti/perjeta today. Reports knee pain and will f/u PCP 23 ECHO 4/10/23 LVEF 61% No CP or SOB Patient had imaging done on 3/2023. Upon review of CT films, my interpretation is that patient has STABLE disease. I reviewed these findings with the patient   2024 Here for Kanjinti perjeta today No cp sob, no GI sx with HP  She is on Xgeva q 6 month and is noted to have mild hypocalcemia. Ca 8.3  Rec to continue Ca suppls 600 mg BID while on Xgeva  Will repeat CMP today.. BP high frequently, WNL today. Rec to see cardioonc. BS better now MRI 10/2022, 2023 stable. Fb Dr Lundberg. MRI 2023 showed POD, s/p SRS 2023, repeat MRI 2024 SD CT 2023 SD , 2024 SD She is on xgeva q6 m as she has been on it for > 4 yrs. Last dental exam 2021. Last Xgeva  23, 24. Next due 2024 ECHO Q3M 23 LVEF 55% to 60%, 10/2023, ECHo due 2024 She reports family issues, her  has worsening Parkinson's disease and 2 falls. son had hernia surgery  24: Patient is here today for Kanjinti/Perjecta- Q3 weeks.  Patient denies any SOB or C/N/V/D. She reports eating well, with no changes in appetite and no weight change.  Patient is on XGEVA, Q 6 months. Next dose due: 24. Patient denies any dental issues.  Patient reports significant pitting edema in her bilateral extremities x 2 months. Denies that it improves with elevation and can be painful. Office to r/o DVT. Bilateral Venous Duplex ordered, ASAP. Referral to Nephrology and Cardiology (appt: ) also placed. Ms. Cardozo reports she discussed this concern with her PCP at the last visit who instructed her to take 2 Hydrochlorothiazide pills daily to see if s/s resolved. Patient reports s/s have remained consistent. Creatinine has progressively increased over the last year.  Ms. Cardozo underwent her last ECHO on 24, next imaging: OVERDUE. Orders placed for ASAP Imaging. Results showin. Left ventricular cavity is normal. Left ventricular wall thickness is mildly increased. Left ventricular systolic function is normal with an ejection fraction of 67 % by Braden's method of disks. Last CT C/A/P: 14; Stable disease Ms. Cardozo reports her son has successfully healed from his hernia s/x . Unfortunately, her  continue to decline and will be entering a Rehabilitation Facility in [place]. Patient also request a letter to be written on behalf of her sister-in-law coming from [place] to be a caregiver to her spouse as she is unable to care for him given her treatment schedule and general overall health.  Treatment held 2/2 overdue ECHO and new s/s of b/l LE swelling (2+pitting) RTC:  2 months with treatment; patient to obtain ECHO and Cardiology clearance prior to next infusion. CT before next visit   2025 Patient is here today for Kanjinti/Perjecta- Q3 weeks. Will repeat imaging studies in 4-6 months. Last CT C/A/P completed 2025; SD noted. Plan to continue HP & Anastrozole until POD. Will order imaging at follow-up visit in May 2025.  Patient denies any Cardiopulmonary s/s or C/N/V/D. She reports she is eating well, with no changes in appetite and no weight change. Patient reported her pain today was a 10/10 concentrating in her back and knees. Discussed adding in breakthrough medication to supplement OTC Tylenol with Oxycodone, 5mg every 6 hours. Patient was relieved and wished for prescription to be sent to [facility] on file. 2 mg IV Morphine given in treatment room. Pain relieved with x 1 dose to 2/10.  Ms. Cardozo is UTD with Cardiology. Patient saw Dr. Holman last on 2025. Patient has longstanding DM2 & HTN, as well as venous insufficiency, diastolic dysfunction (noted on ECHO) with normal PRO-BNP's. LE edema continues to be persistent (+2 on PE today), with some improvement with trial of loop diuretic per patient. Patient was encouraged to follow-up with Vascular so she can be evaluated/measured for compression garments. BP remains slightly elevated, but improved with medication adjustments. Patient is currently taking Losartan 100 mg daily, HCTZ 25 mg daily with 25 mg of Spironolactone PRN, and Carvedilol 3.125 BID. Last ECHO was completed on 2025 with a LVEF of 60%. Repeat imaging Q3 months; imaging ordered/managed by Cardiology.  Patient established care with Dr. Suárez (Nephrology) for CKD. At initial consult, it was thought that the LE edema was unlikely related to her kidney disease. Patient was encouraged to discontinue use of NSAIDS and follow-up in 4-6 months. Since initial evaluation, Creatinine up trending to 1.76 on 2025. Encouraged patient to follow-up with Nephrology at this time. Patient is UTD with Dr. Lundberg; last seen on 2025. Patient denies any neurologic s/s to include: nausea, vomiting, diplopia, blurry vision, unsteady gait or headaches. MR Head last completed 2025; stable disease with no new lesions identified. Patient to undergo repeat testing in 3 months and follow-up accordingly with Dr. Lundberg. RTC: with each infusion; Q3 weeks [FreeTextEntry1] : Started THP on 7/10/17, on HP now\par  Anastrazole 1/2018\par  XTH 6/2021- 8/2021\par  HP 8/2021 [de-identified] : Ms. MONSERRAT CARDOZO is here for follow up visit for metastatic breast cancer. She completed THP 7/2017, HP and Anastrazole. Progression of disease in the brain, started Xeloda Herceptin and tucatinib 6/2021. MRI 8/2021 showed response but pt could not tolerate XHT regimen despite dose reduction. HP restarted 8/2021 2/2025 Patient had imaging done on 12/2024 . Upon review of CT films, my interpretation is that patient has STABLE disease. I reviewed these findings with the patient Patient is here today for Kanjinti/Perjecta- Q3 weeks.  Patient denies any SOB or C/N/V/D. She reports eating well, with no changes in appetite and no weight change.  Patient is on XGEVA, Q 6 months.  Patient denies any dental issues.  Next DUE july 2025 Last ECHO: Ordered Patient established care with Dr. Holman on 24 June 2024. BP high, rec to f/u with him  Patient established care with Dr. Suárez (Nephrology) for CKD. Cr stable  Otherwise feeling well.  F/b Dr Lundberg for brain MRI and brain metastases   5/28/2025: Patient is here today for Kanjinti/Perjecta- Q3 weeks. Will repeat imaging studies in 4-6 months. Last CT C/A/P completed 7 December 2025; SD noted. Plan to continue HP & Anastrozole until POD. PET/CT ordered today secondary to kidney disease and up trending creatinine values with considerable side effects associated: +3 pitting edema, elevated BP, and 10/10 LE pain.  Patient denies any SOB, s/s or C/N/V/D. She reports she is eating well, with no changes in appetite and no weight change. Patient reported her pain today was a 10/10 concentrating in her knees. Discussed referral to Palliative Care for tighter pain control, but declines today. Refill of 5 mg Oxycodone sent to [facility] on file. 1 mg IV Hydromorphone given in treatment room. Discussed ability to complete ADL's and take care of her ailing spouse. The patient reports she is experiencing some challenges and is working to bring her sister back from her home country.  Patient UTD with PCP; last seen by Dr. Willis on 13 May 2025. Lab work and Bilateral Venous Duplexes ordered. Imaging completed on 23 May 2025; no acute findings. Referral to Vascular Surgery sent; scheduled for 3 Dina 2025. Ms. Cardozo is UTD with Cardiology. Patient saw Dr. Holman last on 14 April 2025. Patient has longstanding DM2 & HTN, as well as venous insufficiency, diastolic dysfunction (noted on ECHO) with normal PRO-BNP's. LE edema continues to be persistent (+3 on PE today), with some improvement with trial of loop diuretic per patient.  BP remains slightly elevated, but improved with medication adjustments. Patient is currently taking Losartan 100 mg daily, HCTZ 25 mg daily with 25 mg of Spironolactone PRN, and Carvedilol 3.125 BID. Last ECHO was completed on 25 March 2025 with a LVEF of 60%. Repeat imaging Q3 months; imaging ordered/managed by Cardiology.  Patient is UTD with Dr. Lundberg; last seen on 13 March 2025. Patient denies any neurologic s/s to include: nausea, vomiting, diplopia, blurry vision, unsteady gait or headaches. MR Head last completed 7 March 2025; stable disease with no new lesions identified. Patient to undergo repeat testing in 3 months and follow-up accordingly with Dr. Lundberg; scheduled for 4 June 2025. RTC: with each infusion; Q3 weeks

## 2025-05-28 NOTE — ASSESSMENT
[Palliative] : Goals of care discussed with patient: Palliative [FreeTextEntry1] : This is a 75-year-old very pleasant  lady, with medical history significant for diabetes, dyslipidemia, hypertension and hypothyroidism, who is diagnosed with metastatic stage IV ER positive, ME negative, HER-2/douglas positive breast cancer. She presented with symptomatic brain metastasis and completed gamma knife radiosurgery to the brain metastases. She also has bone metastases, lymph node metastasis and peritoneal metastasis. She started THP on 7/10/17. Excellent response to chemo clinically, inflammatory changes resolved, CT scans after 4 cycles showed excellent response except one new sclerotic focus on L3 which was radiated. PET 3/2018 showed good response, Brain MRI shows new small lesion. She is on Arimidex + Herceptin, Perjeta. Progression of disease in the brain, started Xeloda Herceptin and tucatinib 6/2021.  METASTATIC BREAST CANCER WITH BRAIN METASTASES: Progression of disease in the brain, started Xeloda Herceptin and Tucatinib 6/2021. MRI 8/2021 showed response but pt could not tolerate XHT regimen despite dose reduction. HP restarted 8/2021. Patient is tolerating Anastrozole, Herceptin & Perjeta.  5/28/2025: Patient is here today for Kanjinti/Perjecta- Q3 weeks. Will repeat imaging studies in 4-6 months. Last CT C/A/P completed 7 December 2025; SD noted. Plan to continue HP & Anastrozole until POD. PET/CT ordered today secondary to kidney disease and up trending creatinine values with considerable side effects associated: +3 pitting edema, elevated BP, and 10/10 LE pain.  Patient denies any SOB, s/s or C/N/V/D. She reports she is eating well, with no changes in appetite and no weight change. Patient reported her pain today was a 10/10 concentrating in her knees. Discussed referral to Palliative Care for tighter pain control, but declines today. Refill of 5 mg Oxycodone sent to [facility] on file. 1 mg IV Hydromorphone given in treatment room. Discussed ability to complete ADL's and take care of her ailing spouse. The patient reports she is experiencing some challenges and is working to bring her sister back from her home country.  Patient UTD with PCP; last seen by Dr. Willis on 13 May 2025. Lab work and Bilateral Venous Duplexes ordered. Imaging completed on 23 May 2025; no acute findings. Referral to Vascular Surgery sent; scheduled for 3 Dina 2025. Ms. Cardozo is UTD with Cardiology. Patient saw Dr. Holman last on 14 April 2025. Patient has longstanding DM2 & HTN, as well as venous insufficiency, diastolic dysfunction (noted on ECHO) with normal PRO-BNP's. LE edema continues to be persistent (+3 on PE today), with some improvement with trial of loop diuretic per patient.  BP remains slightly elevated, but improved with medication adjustments. Patient is currently taking Losartan 100 mg daily, HCTZ 25 mg daily with 25 mg of Spironolactone PRN, and Carvedilol 3.125 BID. Last ECHO was completed on 25 March 2025 with a LVEF of 60%. Repeat imaging Q3 months; imaging ordered/managed by Cardiology.  Patient is UTD with Dr. Lundberg; last seen on 13 March 2025. Patient denies any neurologic s/s to include: nausea, vomiting, diplopia, blurry vision, unsteady gait or headaches. MR Head last completed 7 March 2025; stable disease with no new lesions identified. Patient to undergo repeat testing in 3 months and follow-up accordingly with Dr. Lundberg; scheduled for 4 June 2025. RTC: with each infusion; Q3 weeks     Patient UTD with PCP; last seen by Dr. Willis on 13 May 2025. Lab work and Bilateral Venous Duplexes ordered. Imaging completed on 23 May 2025; no acute findings. Referral to Vascular Surgery sent; scheduled for 3 Dina 2025.  BRAIN METASTASES: MRI 9/2023 POD- s/p SRS MRI 12/2024 SD MRI 3/2024 SD  Patient is UTD with Dr. Lundberg; last seen on 13 March 2025. Patient denies any neurologic s/s to include: nausea, vomiting, diplopia, blurry vision, unsteady gait or headaches. MR Head last completed 7 March 2025; stable disease with no new lesions identified. Patient to undergo repeat testing in 3 months and follow-up accordingly with Dr. Lundberg; scheduled for 4 June 2025.  HTN & RISK FOR CARDIOTOXICITY: Ms. Cardozo is UTD with Cardiology. Patient saw Dr. Holman last on 14 April 2025. Patient has longstanding DM2 & HTN, as well as venous insufficiency, diastolic dysfunction (noted on ECHO) with normal PRO-BNP's. LE edema continues to be persistent (+2 on PE today), with some improvement with trial of loop diuretic per patient. Patient was encouraged to follow-up with Vascular so she can be evaluated/measured for compression garments. BP remains slightly elevated, but improved with medication adjustments. Patient is currently taking Losartan 100 mg daily, HCTZ 25 mg daily with 25 mg of Spironolactone PRN, and Carvedilol 3.125 BID. Last ECHO was completed on 25 March 2025 with a LVEF of 60%. Repeat imaging Q3 months; imaging ordered/managed by Cardiology.   BONE METASTASES: She is on XGEVA Q6M. She reports she saw dental 4/2021 and exam was good. Last XGEVA given: 1/2025  PERITONEAL METASTASES: No ascites on PE. Will continue to monitor.  DIABETES: Patient established care with Dr. Suárez (Nephrology) for CKD. At initial consult, it was thought that the LE edema was unlikely related to her kidney disease. Patient was encouraged to discontinue use of NSAIDS and follow-up in 4-6 months. Since initial evaluation, Creatinine up trending to 1.76 on 14 April 2025. Encouraged patient to follow-up with Nephrology at this time.  - Elevated tumor markers: stable with minor fluctuations noted. Trend q 2 months  Continue Grisel Garrido Q3 weeks; appointments made through August 2025 XGEVA Q6 months; next due July 2025  RTC 3 weeks

## 2025-05-28 NOTE — PHYSICAL EXAM
[Restricted in physically strenuous activity but ambulatory and able to carry out work of a light or sedentary nature] : Status 1- Restricted in physically strenuous activity but ambulatory and able to carry out work of a light or sedentary nature, e.g., light house work, office work [Obese] : obese [Normal] : affect appropriate [de-identified] : port in left chest wall c/d/i , chronic ankle/pedal swelling noted  rt. >left.. Chronic stable B/L LE edema  [de-identified] : Right breast- right breast slightly bigger than left breast + skin thickening in the outer lower quadrant with vague fullness. cellulitis resolved. No LN palpable UNCHANGED [de-identified] : bilateral pitting edema+2

## 2025-06-04 NOTE — HISTORY OF PRESENT ILLNESS
[FreeTextEntry1] : Ms. Cardozo presents with a HER-2 positive breast cancer diagnosed in 2017 with multiple brain metastasis s/p gamma knife in 5/2017 to a right frontal, right parietal, left insular, left parietal, left parietotemporal, and right medial temporal areas. She completed additional gamma knife radiation to a right temporal, left parietal, right cerebella, and right frontal metastasis 12/18/18.  She additionally completed gamma knife to a right parietal and right frontal met on 12/3/2020.  Ms. Cardozo completed SRS on the LINAC for a total of 2000 cgy to the left cerebellum on 1/25/2022. GKRS Right caudate lesion 9/25/2023 2000cgy over 1 Fx.  Oncologic history: She initially presented with aphasia to the [facility] ER in late April 2017. CT head demonstrated a left temporofrontal lesion and MRI demonstrated multiple enhancing lesions with surrounding vasogenic edema without leptomeningeal enhancement. She underwent a CT Chest, Abdomen, and Pelvis demonstrating right axillary and pectoral lymphadenopathy, an irregular mass in the right breast, a 1.5 cm celiac axis lymph node and an omental implant suspicious for peritoneal carcinomatosis. She underwent right axillary ultrasound guided biopsy demonstrating ER+/CT negative/Her2 positive breast cancer. She underwent gamma knife SRS to multiple brain metastasis on 5/30/17 and did well. She then went on THP chemotherapy in July 2017, transitioned to HP chemotherapy, and  anastrozole as well.  PET/CT on 3/17/18 demonstrated increased FDG activity in the right breast with SUV 3.2, previously 2.7 with FDG-avid skin thickening overlying the breast. Otherwise it demonstrated a non-specific sclerotic lesion in the body of T8 and a lucent lesion in the body of T5 without increased FDG activity. MRI brain demonstrated a new tiny focus of abnormal enhancement in the right cerebellum measuring 0.2 mm with otherwise stable/decreasing size/enhancement of metastases. She was not a candidate for IA herceptin. She saw Dr. Phipps in April who recommended expectant management. She is now on arimidex + herceptin, perjeta.   7/19/19 Repeat MRI in June 2018 showed overall stable or diminished lesion but demonstrated one area of abnormal enhancement v artifact seen in left parietal region. PET/CT in June 2018 was stable with no new disease.  Today, patient reports no HA, vision changes, or dizziness. She reports new left leg numbness and pins/needle sensation on the anterior surface of her legs and pain radiation down from her back to her toes. She has a history of sciatica but the numbness is new. Ms. Cardozo denies urinary retention or lower extremity weakness. She is scheduled for MR of the spine on Sunday. In addition, she also reports problem with sleeping as well as diffuse aches in her shoulders, arms, and legs  12/5/18- Ms. Cardozo presents today for follow up . Since she saw us last she has continued to follow with Dr. Shaunna Cohen. Has continued on arimidex.  MRI brain done 11/30/18 showed multiple new brain mets worrisome for metastatic disease. Today she notes she has headaches, 2-3/10/ in varying spots on her head. These have been long standing, even before treatment. Denies focal weakness. Notes numbness to the right leg for the past week. Denies dizziness, trouble walking. Has lower longstanding, currently having injections to her back every two weeks. CT CAP done 11/19/18 showed sclerotic lesions in several vertebral bodies and the left hemisacrum, suspicious for metastatic disease,stable since 10/6/18  3/6/19- Ms. Cardozo presents today for follow up. She underwent gamma knife on 12/18/18 to four focuses of disease, including right temporal left parietal, right cerebellar, and right frontal. She continues on arimidex and xgeva. Today she notes on and off headaches, which are stable for a long time, relieved by tylenol. She notes a new development of numbness to her lips, with a feeling that something is walking on her face. She denies focal weakness, confusion, seizures, dizziness, trouble with vision.  3/26/19- Ms. Cardozo presents today for follow up. She underwent a brain MRI on 3/19/19 which showed previously noted tiny areas of abnormal enhancement in the posterior fossa tentorial region are no longer seen which is likely compatible with response to therapy. Clinical correlation and continued close follow up is recommended.  CT CAP showed stable osseous mets. Continues on xgeva and anastrazole.  Today she still has headaches that come and go and are unchanged. She still has intermittent numbness to her face. She denies confusion, dizziness, visual difficulties, trouble swallowing, trouble hearing. She has intermittent numbness to the right toes.  She recently completed antibiotics for bronchitis, still with some residual cough.   7/3/19- Ms. Cardozo presents today for follow up. She has continued to follow with Dr. Shaunna Cohen. She continues on arimidex, herceptin, and perjeta. Due for next body scans in 8/2019.  MRI 7/2/19 showed In comparison with 3/19/2019, no significant interval change, previous noted enhancing foci are again noted longer well appreciated. There is redemonstration of foci of hyperintense T2 and FLAIR signal within the right frontal, left subinsular and temporal lobes without interval change from prior. There is no new large focal abnormal areas of enhancement, restricted diffusion, hemorrhage or midline shift.   Today she feels well. Still has very slight intermittent headaches, not bothersome. Facial numbness is improved. Denies dizziness, trouble with balance, trouble with vision, nausea, vomiting.   11/19/2020- Ms. Cardozo presents today for follow up. She is seen today through TELEPHONE for which she provides verbal consent on 11/19/2020 at 2:39 PM. She has continued to follow with Dr. Cohen.  She is on armimidex, herceptin, and perjeta.  MRI brain in 2/2020 was stable, however her brain MRI 11/3/2020 showed: -Area of abnormal T1 and T2 prolongation with associated enhancement is again seen involving the left posterior temporal cortex. This finding continues to increase when compared with the prior study and currently measures approximately 0.7 x 1.1 cm. This finding is worrisome for an underlying lesion such as metastasis given patient's history.  -Small focus of abnormal enhancement involving the high left frontal cortex (series 9 image 132). This finding measures approximately 0.6 cm and previously measured approximately 0.2 cm. -Abnormal enhancement involving the right posterior temporal/inferior parietal cortex is again seen. This finding measures approximately 0.9 cm and previously measured approximately 0.6 cm. -Tiny area of abnormal enhancement is seen involving the left frontal subcortical region. This best seen on series 10 image 17 and measures approximately 0.2 cm. -Tiny focus of enhancement involving the high left frontal cortex (series 9 image 131). This finding measures approximately 0.5 cm.  Most recent body imaging 11/3/2020 showed a stable appearance of the chest, abdomen, and pelvis.   Today she feels well. denies headaches. She notes some pain to her lower back, which is longstanding, though has gotten worse in recent months. sometimes with numbness in the legs.  Denies nausea, vomiting, focal weakness.   2/17/2021- Ms. Cardozo presents today for follow up. She underwent a brain MRI  2/17/2021. This revealed Compared to the previous studies, the enhancing lesion in the right parietal lobe has resolved.  The lesions in the left anterior insula, left temporal lobe and left inferior cerebellum remain stable in size. Right occipital FLAIR hyperintensity without appreciable enhancement has also remained stable.  Continues following with Dr. Cohen. Remains on anastrazole, herceptin, and perjeta.  Today she denies headaches, nausea, vomiting. Has some intermittent numbness to the right face which is stable. She has back pain to the lower back and bilateral upper and lower extremities. This pain is stable.  5/25/2021- Ms. Cardozo presents today follow up. She continues to follow with Dr. Cohen and continues on Herceptin, Perjeta, and Arimidex.  Today she notes left eye pain, and feels her right eye is bulging. She has left fontal intermittent headaches that do not require medications. Chronic bilateral LE pain. She has numbness to her face.   MRI brain 5/20/2021 showed Abnormal lesions in the posterior fossa and supratentorial region. Some of these lesions have increased in size which is worrisome for progression of patient's underlying disease process. Clinical correlation continued close interval follow-up is recommended.  9/21/2021- Ms. Cardozo presents today for follow up. MRI brain done 8/23/2021 showed decreased size and enhancement of left inferior cerebellar lesion. Grossly stable bilateral temporal and left insular lesions. No new lesions.  Last body imaging CT CAP 5/29/2021 showed Stable findings. Osseous metastatic disease is without change in the chest and abdomen. Continues to follow with Dr. Cohen. Xeloda tucatinib herceptin started 6/15/2021 due to POD in brain however was discontinued in 8/2021 due to fatigue, diarrhea, SILAS, LE swelling, and pain. She is back on anastrazole herceptin perjeta.   Today she is feeling ok. She notes some headaches from time to time which have been stable over a long time. Stable facial numbness. no new nausea, vomiting, weakness. remains bothered by fluid retention which started after taking xeloda/tucatinib.  12/22/2021: Patient presents today for follow-up. She discontinued tucatinib/xeloda due to poor tolerability.  She presents with a new MRI.   MRI Head w/wo IV contrast (12/18/2021) shows 5 SMALL LESIONS AS DESCRIBED, 2 OF WHICH ARE STABLE, 2 WHICH HAVE MILDLY INCREASED IN SIZE, AND ONE WHICH APPEARS NEW. FINDINGS SUGGEST MILD INTERVAL PROGRESSION OF PATIENT'S KNOWN METASTATIC DISEASE. RECOMMEND CLINICAL CORRELATION AND INTERVAL FOLLOW-UP. Today denies denies complaints.   1/19/2022: Pt presents for follow-up. MRI 1/19/22 shows further slight progression of the cerebellar lesion while other lesions remain stable.    3/16/2022: Pt presents for PTE. Completed Linac SRS 2000 cgy/1fx to L cerebellum on 1/25/22. Reports feeling well overall. Having occasional HA (3/10, no medication). Notes mild pain and weakness in L shoulder, but denies general muscle weakness. Continues to have numbness in hands following secondary to chemo. Currently receiving Herceptin, Perjeta, and Anastrazole with Dr. Cohen.  4/14/2022- Ms. Cardozo presents today for PTE. Her MRI was done on 4/13, there is no final MRI but appears to be a good response to the left cerebellar lesion. Continues on kanjinti and perjeta.  CT CAP 3/26/2022 showed Stable examination. Sclerotic densities in bone which have not significantly changed.  Today she feels well. notes slight intermittent headaches, unchanged recently. no new focal weakness. notes a bald spot to her posterior head  7/13/2022 - Ms. Cardozo presents today for follow up.  Continues to follow with Dr. Cohen. Continues on herceptin and xgeva.  Brain MRI done 7/10/2022.  Body imaging 3/2022 stable.  10/26/2022- Ms. Cardozo presents today for follow up.  CT CAP 9/7/2022 showed Stable examination. Sclerotic densities in bone which have not significantly changed.  MRI brain 10/21/2022 showed New punctate focus of enhancement within the right caudate nucleus. Enhancing left insular nodule appears minimally larger measuring 6 x 5 mm, previously measured 5 x 4 mm. Slight increased enhancement associated with the anterior right frontal FLAIR signal abnormality.  Enhancing nodule in the left temporal region appears essentially stable. New linear focus of enhancement just medial to the lesion may be vascular in nature. Stable signal abnormality with vague enhancement anteromedial aspect of the right temporal lobe.  Findings suggest mild disease progression. Recommend clinical correlation and close interval follow-up.  continues on herceptin/perjeta.  Feeling well today. no headaches, no nausea, no focal weakness or confusion or dizziness.  1/25/2023- Ms. Cardozo presents today for follow up. Seen through TELEPHONE for which she consents on 1/25/2023 at 3:10 PM. MRI brain done 12/21/2022 showed Mild interval increase in size of the small focus of enhancement within the right caudate body currently measuring 3 mm, previously 2 mm. Other areas of signal abnormality and enhancement are stable. Please see report for description.  No new lesions seen.  Continues to follow with Dr. Cohen. Continues on Herceptin and Perjeta every 3 weeks. No new body imaging since September 2022, which was stable. Feeling well overall no headaches, no nausea, no focal weakness, no confusion.  3/9/2023 She presents for follow up. At last visit, repeat MRI ordered 2 months from last MRI for concerning increase in size of the right caudate lesion.  2/25/23 Brain MRI IMPRESSION: No cystic change compared with 12/21/2022. 1. Stable bilateral supratentorial enhancing nodules, as described in detail above, compatible with the provided history of metastatic disease. 2. No new lesions visualized.  3/2/2023 CT C/A/P showed stable exam  Today she reports headaches relived with Tylenol or Advil. Today she reported pain to left leg while walking into the building(likely claudication) encouraged to follow up with PCP.  Visit dated 9/19/2023 patient returns for f/u with images for review. Reports intermittent HAs, occasional "I feel I would pass out and double vision also a spinning sensation of the room" about three weeks ago. Today she is w/o those symptoms. Continues to follow with Dr. Cohen on Herceptin/Perjeta/ Anastrozole Xgeva next dose 1/2024  MRI brain ww/o contrast 9/8/2023 IMPRESSION: No hydrocephalus or new enhancing lesions. 5 parenchymal lesions are identified which appear similar to the prior exam of 2/25/2023. Right caudate head lesion is slightly larger measuring 4.8 mm compared with the prior of 3.2 mm.  CT C/A/P 9/1/82023 IMPRESSION: Stable examination as compared to CT 3/2/2023. Sclerotic osseous lesions, without significant change.  Visit dated 11/29/2023   Patient returns for post treatment f/u and progress check after completion of GKRS Right caudate lesion 9/25/2023 2000cgy over 1 Fx. Reports doing well post treatment. Endorses intermittent HAs Denies N/V, unilateral extremity weakness/memory changes/gait disturbance/bowel/bladder dysfunction or other neurologic symptoms. No issues with speech or comprehension. Persistent knee pain for which she gets injections and is currently in PT  Continues to follow with Dr. Cohen on Herceptin/Perjeta every 3 weeks.   Visit dated 1/10/2024   Patient returns for routine follow up to include progress check and review of completed cranial images. Denies N/V, HA/unilateral extremity weakness/memory changes/gait disturbance/bowel/bladder dysfunction or other neurologic symptoms. No issues with speech or comprehension. SHe is w/o any new verbalized concerns at today's visit.  Continues to follow with Dr. Cohen on Herceptin /Perjeta every 3 weeks since 8/2021 MRI brain w w/o contrast 1/8/2024 - stable on my review, pending read  VISIT DATED: 9/17/2024 Patient returns for routine f/u and progress check with cranial images for review. Reports doing well from a neurological standpoint. Does note bilateral leg edema per patient workup thus far negative. Plans to see vascular on 9/20/2024 for further workup. Leg edema does result in difficulty with extended.  Continues to follow with Dr. Cohen on Kanjinti/Perjeta every 3 weeks since 8/2021.  Also, on Anastrozole Last CT C/A/P w w/o contrast 6/20/2024 IMPRESSION: Stable examination. Sclerotic osseous lesions, unchanged.  MRI brain w w/o contrast 9/16/2024 no final read available at this time  VISIT DATED: 12/18/2024 Patient presents for routine f/u and progress check with cranial images for review. Reports she continues to do well. Does note neuropathy of the hands/ feet. Uses a cane for safety. Intermittent headaches. She is w/o any new concerns today. Continues to follow with Dr. Cohen on Herceptin /Perjeta every 3 weeks since 8/2021 with recent CT C/A/P 12/7/2024 w/o any new findings.  MRI brain w w/o contrast 12/16/2024 IMPRESSION: Multiple parenchymal lesions compatible with metastatic disease. Anterior left insular lesion (16-80) appears stable measuring 6 x 3 mm previously 6 x 3 mm. Punctate anterior right frontal cortical lesion (16-30) in retrospect appears stable measuring 2 mm previously 2 mm. Punctate medial right occipital lesion (16-90) appears stable measuring 2 mm. Linear left temporal lesion (16-71) appears stable measuring 12 x 3 mm. Previously described punctate left frontal subcortical lesion no longer seen. No new lesions identified  VISIT DATED: 3/12/2025 Ms. Cardozo presents for routine f/u and progress check with cranial images for review. States neurologically doing well but notes generalized aches/pain for which she uses OTC Tylenol.  Continues to follow with Dr. Cohen on Herceptin /Perjeta every 3 weeks since 8/2021.Also follows with Dr. Whitehead to monitor for Herceptin induced cardiotoxicity which changes made to her medication regimen to include a diuretics.  MRI brain w w/o contrast 3/7/2025 IMPRESSION: Stable small parenchymal lesions. No new lesions identified  VISIT DATED: 6/4/2025 Ms. Cardozo presents for routine f/u and progress evaluation with cranial images for review to ensure continued stability of cranial metastatic disease. States she continues to do well with no new or worsening neurological deficits. Does have intermittent headaches which are short-lived thus not interfering with her quality of life. Does have knee pain for which she gets injections.  Continues to follow with Dr. Cohen on Herceptin /Perjeta every 3 weeks since 8/2021. Awaiting date for next Petct Also follows with Dr. Whitehead to monitor for Herceptin induced cardiotoxicity. ECHO every 3 months. BLE edema on diuretics also saw podiatrist who recommended a vascular workup.  MRI brain w w/o contrast 5/30/2025 final read pending at time of this entry

## 2025-06-04 NOTE — PHYSICAL EXAM
[Obese] : obese [] : no respiratory distress [Heart Rate And Rhythm] : heart rate and rhythm were normal [Oriented To Time, Place, And Person] : oriented to person, place, and time [General Appearance - Alert] : alert [General Appearance - In No Acute Distress] : in no acute distress [de-identified] : uses cane for stability

## 2025-06-04 NOTE — REVIEW OF SYSTEMS
[Muscle Pain] : muscle pain [Muscle Weakness] : muscle weakness [Dizziness] : dizziness [Negative] : Genitourinary [Cognitive Disturbance: Grade 0] : Cognitive Disturbance: Grade 0 [Concentration Impairment: Grade 0] : Concentration Impairment: Grade 0 [Dizziness: Grade 0] : Dizziness: Grade 0  [Facial Muscle Weakness: Grade 0] : Facial Muscle Weakness: Grade 0 [Headache: Grade 1 - Mild pain] : Headache: Grade 1 - Mild pain [Lethargy: Grade 0] : Lethargy: Grade 0 [Meningismus: Grade 0] : Meningismus: Grade 0 [Peripheral Motor Neuropathy: Grade 0] : Peripheral Motor Neuropathy: Grade 0 [Peripheral Sensory Neuropathy: Grade 1 - Asymptomatic; loss of deep tendon reflexes or paresthesia] : Peripheral Sensory Neuropathy: Grade 1 - Asymptomatic; loss of deep tendon reflexes or paresthesia [Somnolence: Grade 0] : Somnolence: Grade 0 [Confused] : no confusion [FreeTextEntry9] : has pain "all over the place." uses cane for safety LEFT knee pain impairs walking [de-identified] : Intermittent HAs, + neuropathy hands/feet

## 2025-06-04 NOTE — PHYSICAL EXAM
[Obese] : obese [] : no respiratory distress [Heart Rate And Rhythm] : heart rate and rhythm were normal [Oriented To Time, Place, And Person] : oriented to person, place, and time [General Appearance - Alert] : alert [General Appearance - In No Acute Distress] : in no acute distress [de-identified] : uses cane for stability

## 2025-06-04 NOTE — REVIEW OF SYSTEMS
[Muscle Pain] : muscle pain [Muscle Weakness] : muscle weakness [Dizziness] : dizziness [Negative] : Genitourinary [Cognitive Disturbance: Grade 0] : Cognitive Disturbance: Grade 0 [Concentration Impairment: Grade 0] : Concentration Impairment: Grade 0 [Dizziness: Grade 0] : Dizziness: Grade 0  [Facial Muscle Weakness: Grade 0] : Facial Muscle Weakness: Grade 0 [Headache: Grade 1 - Mild pain] : Headache: Grade 1 - Mild pain [Lethargy: Grade 0] : Lethargy: Grade 0 [Meningismus: Grade 0] : Meningismus: Grade 0 [Peripheral Motor Neuropathy: Grade 0] : Peripheral Motor Neuropathy: Grade 0 [Peripheral Sensory Neuropathy: Grade 1 - Asymptomatic; loss of deep tendon reflexes or paresthesia] : Peripheral Sensory Neuropathy: Grade 1 - Asymptomatic; loss of deep tendon reflexes or paresthesia [Somnolence: Grade 0] : Somnolence: Grade 0 [Confused] : no confusion [FreeTextEntry9] : has pain "all over the place." uses cane for safety LEFT knee pain impairs walking [de-identified] : Intermittent HAs, + neuropathy hands/feet

## 2025-06-04 NOTE — VITALS
[Maximal Pain Intensity: 5/10] : 5/10 [Least Pain Intensity: 1/10] : 1/10 [Pain Location: ___] : Pain Location: [unfilled] [OTC] : OTC [90: Able to carry normal activity; minor signs or symptoms of disease.] : 90: Able to carry normal activity; minor signs or symptoms of disease.  [Maximal Pain Intensity: 6/10] : 6/10 [Least Pain Intensity: 2/10] : 2/10

## 2025-06-04 NOTE — HISTORY OF PRESENT ILLNESS
[FreeTextEntry1] : Ms. Cardozo presents with a HER-2 positive breast cancer diagnosed in 2017 with multiple brain metastasis s/p gamma knife in 5/2017 to a right frontal, right parietal, left insular, left parietal, left parietotemporal, and right medial temporal areas. She completed additional gamma knife radiation to a right temporal, left parietal, right cerebella, and right frontal metastasis 12/18/18.  She additionally completed gamma knife to a right parietal and right frontal met on 12/3/2020.  Ms. Cardozo completed SRS on the LINAC for a total of 2000 cgy to the left cerebellum on 1/25/2022. GKRS Right caudate lesion 9/25/2023 2000cgy over 1 Fx.  Oncologic history: She initially presented with aphasia to the [facility] ER in late April 2017. CT head demonstrated a left temporofrontal lesion and MRI demonstrated multiple enhancing lesions with surrounding vasogenic edema without leptomeningeal enhancement. She underwent a CT Chest, Abdomen, and Pelvis demonstrating right axillary and pectoral lymphadenopathy, an irregular mass in the right breast, a 1.5 cm celiac axis lymph node and an omental implant suspicious for peritoneal carcinomatosis. She underwent right axillary ultrasound guided biopsy demonstrating ER+/NC negative/Her2 positive breast cancer. She underwent gamma knife SRS to multiple brain metastasis on 5/30/17 and did well. She then went on THP chemotherapy in July 2017, transitioned to HP chemotherapy, and  anastrozole as well.  PET/CT on 3/17/18 demonstrated increased FDG activity in the right breast with SUV 3.2, previously 2.7 with FDG-avid skin thickening overlying the breast. Otherwise it demonstrated a non-specific sclerotic lesion in the body of T8 and a lucent lesion in the body of T5 without increased FDG activity. MRI brain demonstrated a new tiny focus of abnormal enhancement in the right cerebellum measuring 0.2 mm with otherwise stable/decreasing size/enhancement of metastases. She was not a candidate for IA herceptin. She saw Dr. Phipps in April who recommended expectant management. She is now on arimidex + herceptin, perjeta.   7/19/19 Repeat MRI in June 2018 showed overall stable or diminished lesion but demonstrated one area of abnormal enhancement v artifact seen in left parietal region. PET/CT in June 2018 was stable with no new disease.  Today, patient reports no HA, vision changes, or dizziness. She reports new left leg numbness and pins/needle sensation on the anterior surface of her legs and pain radiation down from her back to her toes. She has a history of sciatica but the numbness is new. Ms. Cardozo denies urinary retention or lower extremity weakness. She is scheduled for MR of the spine on Sunday. In addition, she also reports problem with sleeping as well as diffuse aches in her shoulders, arms, and legs  12/5/18- Ms. Cardozo presents today for follow up . Since she saw us last she has continued to follow with Dr. Shaunna Cohen. Has continued on arimidex.  MRI brain done 11/30/18 showed multiple new brain mets worrisome for metastatic disease. Today she notes she has headaches, 2-3/10/ in varying spots on her head. These have been long standing, even before treatment. Denies focal weakness. Notes numbness to the right leg for the past week. Denies dizziness, trouble walking. Has lower longstanding, currently having injections to her back every two weeks. CT CAP done 11/19/18 showed sclerotic lesions in several vertebral bodies and the left hemisacrum, suspicious for metastatic disease,stable since 10/6/18  3/6/19- Ms. Cardozo presents today for follow up. She underwent gamma knife on 12/18/18 to four focuses of disease, including right temporal left parietal, right cerebellar, and right frontal. She continues on arimidex and xgeva. Today she notes on and off headaches, which are stable for a long time, relieved by tylenol. She notes a new development of numbness to her lips, with a feeling that something is walking on her face. She denies focal weakness, confusion, seizures, dizziness, trouble with vision.  3/26/19- Ms. Cardozo presents today for follow up. She underwent a brain MRI on 3/19/19 which showed previously noted tiny areas of abnormal enhancement in the posterior fossa tentorial region are no longer seen which is likely compatible with response to therapy. Clinical correlation and continued close follow up is recommended.  CT CAP showed stable osseous mets. Continues on xgeva and anastrazole.  Today she still has headaches that come and go and are unchanged. She still has intermittent numbness to her face. She denies confusion, dizziness, visual difficulties, trouble swallowing, trouble hearing. She has intermittent numbness to the right toes.  She recently completed antibiotics for bronchitis, still with some residual cough.   7/3/19- Ms. Cardozo presents today for follow up. She has continued to follow with Dr. Shaunna Cohen. She continues on arimidex, herceptin, and perjeta. Due for next body scans in 8/2019.  MRI 7/2/19 showed In comparison with 3/19/2019, no significant interval change, previous noted enhancing foci are again noted longer well appreciated. There is redemonstration of foci of hyperintense T2 and FLAIR signal within the right frontal, left subinsular and temporal lobes without interval change from prior. There is no new large focal abnormal areas of enhancement, restricted diffusion, hemorrhage or midline shift.   Today she feels well. Still has very slight intermittent headaches, not bothersome. Facial numbness is improved. Denies dizziness, trouble with balance, trouble with vision, nausea, vomiting.   11/19/2020- Ms. Cardozo presents today for follow up. She is seen today through TELEPHONE for which she provides verbal consent on 11/19/2020 at 2:39 PM. She has continued to follow with Dr. Cohen.  She is on armimidex, herceptin, and perjeta.  MRI brain in 2/2020 was stable, however her brain MRI 11/3/2020 showed: -Area of abnormal T1 and T2 prolongation with associated enhancement is again seen involving the left posterior temporal cortex. This finding continues to increase when compared with the prior study and currently measures approximately 0.7 x 1.1 cm. This finding is worrisome for an underlying lesion such as metastasis given patient's history.  -Small focus of abnormal enhancement involving the high left frontal cortex (series 9 image 132). This finding measures approximately 0.6 cm and previously measured approximately 0.2 cm. -Abnormal enhancement involving the right posterior temporal/inferior parietal cortex is again seen. This finding measures approximately 0.9 cm and previously measured approximately 0.6 cm. -Tiny area of abnormal enhancement is seen involving the left frontal subcortical region. This best seen on series 10 image 17 and measures approximately 0.2 cm. -Tiny focus of enhancement involving the high left frontal cortex (series 9 image 131). This finding measures approximately 0.5 cm.  Most recent body imaging 11/3/2020 showed a stable appearance of the chest, abdomen, and pelvis.   Today she feels well. denies headaches. She notes some pain to her lower back, which is longstanding, though has gotten worse in recent months. sometimes with numbness in the legs.  Denies nausea, vomiting, focal weakness.   2/17/2021- Ms. Cardozo presents today for follow up. She underwent a brain MRI  2/17/2021. This revealed Compared to the previous studies, the enhancing lesion in the right parietal lobe has resolved.  The lesions in the left anterior insula, left temporal lobe and left inferior cerebellum remain stable in size. Right occipital FLAIR hyperintensity without appreciable enhancement has also remained stable.  Continues following with Dr. Cohen. Remains on anastrazole, herceptin, and perjeta.  Today she denies headaches, nausea, vomiting. Has some intermittent numbness to the right face which is stable. She has back pain to the lower back and bilateral upper and lower extremities. This pain is stable.  5/25/2021- Ms. Cardozo presents today follow up. She continues to follow with Dr. Cohen and continues on Herceptin, Perjeta, and Arimidex.  Today she notes left eye pain, and feels her right eye is bulging. She has left fontal intermittent headaches that do not require medications. Chronic bilateral LE pain. She has numbness to her face.   MRI brain 5/20/2021 showed Abnormal lesions in the posterior fossa and supratentorial region. Some of these lesions have increased in size which is worrisome for progression of patient's underlying disease process. Clinical correlation continued close interval follow-up is recommended.  9/21/2021- Ms. Cardozo presents today for follow up. MRI brain done 8/23/2021 showed decreased size and enhancement of left inferior cerebellar lesion. Grossly stable bilateral temporal and left insular lesions. No new lesions.  Last body imaging CT CAP 5/29/2021 showed Stable findings. Osseous metastatic disease is without change in the chest and abdomen. Continues to follow with Dr. Cohen. Xeloda tucatinib herceptin started 6/15/2021 due to POD in brain however was discontinued in 8/2021 due to fatigue, diarrhea, SILAS, LE swelling, and pain. She is back on anastrazole herceptin perjeta.   Today she is feeling ok. She notes some headaches from time to time which have been stable over a long time. Stable facial numbness. no new nausea, vomiting, weakness. remains bothered by fluid retention which started after taking xeloda/tucatinib.  12/22/2021: Patient presents today for follow-up. She discontinued tucatinib/xeloda due to poor tolerability.  She presents with a new MRI.   MRI Head w/wo IV contrast (12/18/2021) shows 5 SMALL LESIONS AS DESCRIBED, 2 OF WHICH ARE STABLE, 2 WHICH HAVE MILDLY INCREASED IN SIZE, AND ONE WHICH APPEARS NEW. FINDINGS SUGGEST MILD INTERVAL PROGRESSION OF PATIENT'S KNOWN METASTATIC DISEASE. RECOMMEND CLINICAL CORRELATION AND INTERVAL FOLLOW-UP. Today denies denies complaints.   1/19/2022: Pt presents for follow-up. MRI 1/19/22 shows further slight progression of the cerebellar lesion while other lesions remain stable.    3/16/2022: Pt presents for PTE. Completed Linac SRS 2000 cgy/1fx to L cerebellum on 1/25/22. Reports feeling well overall. Having occasional HA (3/10, no medication). Notes mild pain and weakness in L shoulder, but denies general muscle weakness. Continues to have numbness in hands following secondary to chemo. Currently receiving Herceptin, Perjeta, and Anastrazole with Dr. Cohen.  4/14/2022- Ms. Cardozo presents today for PTE. Her MRI was done on 4/13, there is no final MRI but appears to be a good response to the left cerebellar lesion. Continues on kanjinti and perjeta.  CT CAP 3/26/2022 showed Stable examination. Sclerotic densities in bone which have not significantly changed.  Today she feels well. notes slight intermittent headaches, unchanged recently. no new focal weakness. notes a bald spot to her posterior head  7/13/2022 - Ms. Cardozo presents today for follow up.  Continues to follow with Dr. Cohen. Continues on herceptin and xgeva.  Brain MRI done 7/10/2022.  Body imaging 3/2022 stable.  10/26/2022- Ms. Cardozo presents today for follow up.  CT CAP 9/7/2022 showed Stable examination. Sclerotic densities in bone which have not significantly changed.  MRI brain 10/21/2022 showed New punctate focus of enhancement within the right caudate nucleus. Enhancing left insular nodule appears minimally larger measuring 6 x 5 mm, previously measured 5 x 4 mm. Slight increased enhancement associated with the anterior right frontal FLAIR signal abnormality.  Enhancing nodule in the left temporal region appears essentially stable. New linear focus of enhancement just medial to the lesion may be vascular in nature. Stable signal abnormality with vague enhancement anteromedial aspect of the right temporal lobe.  Findings suggest mild disease progression. Recommend clinical correlation and close interval follow-up.  continues on herceptin/perjeta.  Feeling well today. no headaches, no nausea, no focal weakness or confusion or dizziness.  1/25/2023- Ms. Cardozo presents today for follow up. Seen through TELEPHONE for which she consents on 1/25/2023 at 3:10 PM. MRI brain done 12/21/2022 showed Mild interval increase in size of the small focus of enhancement within the right caudate body currently measuring 3 mm, previously 2 mm. Other areas of signal abnormality and enhancement are stable. Please see report for description.  No new lesions seen.  Continues to follow with Dr. Cohen. Continues on Herceptin and Perjeta every 3 weeks. No new body imaging since September 2022, which was stable. Feeling well overall no headaches, no nausea, no focal weakness, no confusion.  3/9/2023 She presents for follow up. At last visit, repeat MRI ordered 2 months from last MRI for concerning increase in size of the right caudate lesion.  2/25/23 Brain MRI IMPRESSION: No cystic change compared with 12/21/2022. 1. Stable bilateral supratentorial enhancing nodules, as described in detail above, compatible with the provided history of metastatic disease. 2. No new lesions visualized.  3/2/2023 CT C/A/P showed stable exam  Today she reports headaches relived with Tylenol or Advil. Today she reported pain to left leg while walking into the building(likely claudication) encouraged to follow up with PCP.  Visit dated 9/19/2023 patient returns for f/u with images for review. Reports intermittent HAs, occasional "I feel I would pass out and double vision also a spinning sensation of the room" about three weeks ago. Today she is w/o those symptoms. Continues to follow with Dr. Cohen on Herceptin/Perjeta/ Anastrozole Xgeva next dose 1/2024  MRI brain ww/o contrast 9/8/2023 IMPRESSION: No hydrocephalus or new enhancing lesions. 5 parenchymal lesions are identified which appear similar to the prior exam of 2/25/2023. Right caudate head lesion is slightly larger measuring 4.8 mm compared with the prior of 3.2 mm.  CT C/A/P 9/1/82023 IMPRESSION: Stable examination as compared to CT 3/2/2023. Sclerotic osseous lesions, without significant change.  Visit dated 11/29/2023   Patient returns for post treatment f/u and progress check after completion of GKRS Right caudate lesion 9/25/2023 2000cgy over 1 Fx. Reports doing well post treatment. Endorses intermittent HAs Denies N/V, unilateral extremity weakness/memory changes/gait disturbance/bowel/bladder dysfunction or other neurologic symptoms. No issues with speech or comprehension. Persistent knee pain for which she gets injections and is currently in PT  Continues to follow with Dr. Cohen on Herceptin/Perjeta every 3 weeks.   Visit dated 1/10/2024   Patient returns for routine follow up to include progress check and review of completed cranial images. Denies N/V, HA/unilateral extremity weakness/memory changes/gait disturbance/bowel/bladder dysfunction or other neurologic symptoms. No issues with speech or comprehension. SHe is w/o any new verbalized concerns at today's visit.  Continues to follow with Dr. Cohen on Herceptin /Perjeta every 3 weeks since 8/2021 MRI brain w w/o contrast 1/8/2024 - stable on my review, pending read  VISIT DATED: 9/17/2024 Patient returns for routine f/u and progress check with cranial images for review. Reports doing well from a neurological standpoint. Does note bilateral leg edema per patient workup thus far negative. Plans to see vascular on 9/20/2024 for further workup. Leg edema does result in difficulty with extended.  Continues to follow with Dr. Cohen on Kanjinti/Perjeta every 3 weeks since 8/2021.  Also, on Anastrozole Last CT C/A/P w w/o contrast 6/20/2024 IMPRESSION: Stable examination. Sclerotic osseous lesions, unchanged.  MRI brain w w/o contrast 9/16/2024 no final read available at this time  VISIT DATED: 12/18/2024 Patient presents for routine f/u and progress check with cranial images for review. Reports she continues to do well. Does note neuropathy of the hands/ feet. Uses a cane for safety. Intermittent headaches. She is w/o any new concerns today. Continues to follow with Dr. Cohen on Herceptin /Perjeta every 3 weeks since 8/2021 with recent CT C/A/P 12/7/2024 w/o any new findings.  MRI brain w w/o contrast 12/16/2024 IMPRESSION: Multiple parenchymal lesions compatible with metastatic disease. Anterior left insular lesion (16-80) appears stable measuring 6 x 3 mm previously 6 x 3 mm. Punctate anterior right frontal cortical lesion (16-30) in retrospect appears stable measuring 2 mm previously 2 mm. Punctate medial right occipital lesion (16-90) appears stable measuring 2 mm. Linear left temporal lesion (16-71) appears stable measuring 12 x 3 mm. Previously described punctate left frontal subcortical lesion no longer seen. No new lesions identified  VISIT DATED: 3/12/2025 Ms. Cardozo presents for routine f/u and progress check with cranial images for review. States neurologically doing well but notes generalized aches/pain for which she uses OTC Tylenol.  Continues to follow with Dr. Cohen on Herceptin /Perjeta every 3 weeks since 8/2021.Also follows with Dr. Whitehead to monitor for Herceptin induced cardiotoxicity which changes made to her medication regimen to include a diuretics.  MRI brain w w/o contrast 3/7/2025 IMPRESSION: Stable small parenchymal lesions. No new lesions identified  VISIT DATED: 6/4/2025 Ms. Cardozo presents for routine f/u and progress evaluation with cranial images for review to ensure continued stability of cranial metastatic disease. States she continues to do well with no new or worsening neurological deficits. Does have intermittent headaches which are short-lived thus not interfering with her quality of life. Does have knee pain for which she gets injections.  Continues to follow with Dr. Cohen on Herceptin /Perjeta every 3 weeks since 8/2021. Awaiting date for next Petct Also follows with Dr. Whitehead to monitor for Herceptin induced cardiotoxicity. ECHO every 3 months. BLE edema on diuretics also saw podiatrist who recommended a vascular workup.  MRI brain w w/o contrast 5/30/2025 final read pending at time of this entry

## 2025-06-04 NOTE — REVIEW OF SYSTEMS
[Muscle Pain] : muscle pain [Muscle Weakness] : muscle weakness [Dizziness] : dizziness [Negative] : Genitourinary [Cognitive Disturbance: Grade 0] : Cognitive Disturbance: Grade 0 [Concentration Impairment: Grade 0] : Concentration Impairment: Grade 0 [Dizziness: Grade 0] : Dizziness: Grade 0  [Facial Muscle Weakness: Grade 0] : Facial Muscle Weakness: Grade 0 [Headache: Grade 1 - Mild pain] : Headache: Grade 1 - Mild pain [Lethargy: Grade 0] : Lethargy: Grade 0 [Meningismus: Grade 0] : Meningismus: Grade 0 [Peripheral Motor Neuropathy: Grade 0] : Peripheral Motor Neuropathy: Grade 0 [Peripheral Sensory Neuropathy: Grade 1 - Asymptomatic; loss of deep tendon reflexes or paresthesia] : Peripheral Sensory Neuropathy: Grade 1 - Asymptomatic; loss of deep tendon reflexes or paresthesia [Somnolence: Grade 0] : Somnolence: Grade 0 [Confused] : no confusion [FreeTextEntry9] : has pain "all over the place." uses cane for safety LEFT knee pain impairs walking [de-identified] : Intermittent HAs, + neuropathy hands/feet

## 2025-06-04 NOTE — HISTORY OF PRESENT ILLNESS
[FreeTextEntry1] : Ms. Cardozo presents with a HER-2 positive breast cancer diagnosed in 2017 with multiple brain metastasis s/p gamma knife in 5/2017 to a right frontal, right parietal, left insular, left parietal, left parietotemporal, and right medial temporal areas. She completed additional gamma knife radiation to a right temporal, left parietal, right cerebella, and right frontal metastasis 12/18/18.  She additionally completed gamma knife to a right parietal and right frontal met on 12/3/2020.  Ms. Cardozo completed SRS on the LINAC for a total of 2000 cgy to the left cerebellum on 1/25/2022. GKRS Right caudate lesion 9/25/2023 2000cgy over 1 Fx.  Oncologic history: She initially presented with aphasia to the [facility] ER in late April 2017. CT head demonstrated a left temporofrontal lesion and MRI demonstrated multiple enhancing lesions with surrounding vasogenic edema without leptomeningeal enhancement. She underwent a CT Chest, Abdomen, and Pelvis demonstrating right axillary and pectoral lymphadenopathy, an irregular mass in the right breast, a 1.5 cm celiac axis lymph node and an omental implant suspicious for peritoneal carcinomatosis. She underwent right axillary ultrasound guided biopsy demonstrating ER+/AZ negative/Her2 positive breast cancer. She underwent gamma knife SRS to multiple brain metastasis on 5/30/17 and did well. She then went on THP chemotherapy in July 2017, transitioned to HP chemotherapy, and  anastrozole as well.  PET/CT on 3/17/18 demonstrated increased FDG activity in the right breast with SUV 3.2, previously 2.7 with FDG-avid skin thickening overlying the breast. Otherwise it demonstrated a non-specific sclerotic lesion in the body of T8 and a lucent lesion in the body of T5 without increased FDG activity. MRI brain demonstrated a new tiny focus of abnormal enhancement in the right cerebellum measuring 0.2 mm with otherwise stable/decreasing size/enhancement of metastases. She was not a candidate for IA herceptin. She saw Dr. Phipps in April who recommended expectant management. She is now on arimidex + herceptin, perjeta.   7/19/19 Repeat MRI in June 2018 showed overall stable or diminished lesion but demonstrated one area of abnormal enhancement v artifact seen in left parietal region. PET/CT in June 2018 was stable with no new disease.  Today, patient reports no HA, vision changes, or dizziness. She reports new left leg numbness and pins/needle sensation on the anterior surface of her legs and pain radiation down from her back to her toes. She has a history of sciatica but the numbness is new. Ms. Cardozo denies urinary retention or lower extremity weakness. She is scheduled for MR of the spine on Sunday. In addition, she also reports problem with sleeping as well as diffuse aches in her shoulders, arms, and legs  12/5/18- Ms. Cardozo presents today for follow up . Since she saw us last she has continued to follow with Dr. Shaunna Cohen. Has continued on arimidex.  MRI brain done 11/30/18 showed multiple new brain mets worrisome for metastatic disease. Today she notes she has headaches, 2-3/10/ in varying spots on her head. These have been long standing, even before treatment. Denies focal weakness. Notes numbness to the right leg for the past week. Denies dizziness, trouble walking. Has lower longstanding, currently having injections to her back every two weeks. CT CAP done 11/19/18 showed sclerotic lesions in several vertebral bodies and the left hemisacrum, suspicious for metastatic disease,stable since 10/6/18  3/6/19- Ms. Cardozo presents today for follow up. She underwent gamma knife on 12/18/18 to four focuses of disease, including right temporal left parietal, right cerebellar, and right frontal. She continues on arimidex and xgeva. Today she notes on and off headaches, which are stable for a long time, relieved by tylenol. She notes a new development of numbness to her lips, with a feeling that something is walking on her face. She denies focal weakness, confusion, seizures, dizziness, trouble with vision.  3/26/19- Ms. Cardozo presents today for follow up. She underwent a brain MRI on 3/19/19 which showed previously noted tiny areas of abnormal enhancement in the posterior fossa tentorial region are no longer seen which is likely compatible with response to therapy. Clinical correlation and continued close follow up is recommended.  CT CAP showed stable osseous mets. Continues on xgeva and anastrazole.  Today she still has headaches that come and go and are unchanged. She still has intermittent numbness to her face. She denies confusion, dizziness, visual difficulties, trouble swallowing, trouble hearing. She has intermittent numbness to the right toes.  She recently completed antibiotics for bronchitis, still with some residual cough.   7/3/19- Ms. Cardozo presents today for follow up. She has continued to follow with Dr. Shaunna Cohen. She continues on arimidex, herceptin, and perjeta. Due for next body scans in 8/2019.  MRI 7/2/19 showed In comparison with 3/19/2019, no significant interval change, previous noted enhancing foci are again noted longer well appreciated. There is redemonstration of foci of hyperintense T2 and FLAIR signal within the right frontal, left subinsular and temporal lobes without interval change from prior. There is no new large focal abnormal areas of enhancement, restricted diffusion, hemorrhage or midline shift.   Today she feels well. Still has very slight intermittent headaches, not bothersome. Facial numbness is improved. Denies dizziness, trouble with balance, trouble with vision, nausea, vomiting.   11/19/2020- Ms. Cardozo presents today for follow up. She is seen today through TELEPHONE for which she provides verbal consent on 11/19/2020 at 2:39 PM. She has continued to follow with Dr. Cohen.  She is on armimidex, herceptin, and perjeta.  MRI brain in 2/2020 was stable, however her brain MRI 11/3/2020 showed: -Area of abnormal T1 and T2 prolongation with associated enhancement is again seen involving the left posterior temporal cortex. This finding continues to increase when compared with the prior study and currently measures approximately 0.7 x 1.1 cm. This finding is worrisome for an underlying lesion such as metastasis given patient's history.  -Small focus of abnormal enhancement involving the high left frontal cortex (series 9 image 132). This finding measures approximately 0.6 cm and previously measured approximately 0.2 cm. -Abnormal enhancement involving the right posterior temporal/inferior parietal cortex is again seen. This finding measures approximately 0.9 cm and previously measured approximately 0.6 cm. -Tiny area of abnormal enhancement is seen involving the left frontal subcortical region. This best seen on series 10 image 17 and measures approximately 0.2 cm. -Tiny focus of enhancement involving the high left frontal cortex (series 9 image 131). This finding measures approximately 0.5 cm.  Most recent body imaging 11/3/2020 showed a stable appearance of the chest, abdomen, and pelvis.   Today she feels well. denies headaches. She notes some pain to her lower back, which is longstanding, though has gotten worse in recent months. sometimes with numbness in the legs.  Denies nausea, vomiting, focal weakness.   2/17/2021- Ms. Cardozo presents today for follow up. She underwent a brain MRI  2/17/2021. This revealed Compared to the previous studies, the enhancing lesion in the right parietal lobe has resolved.  The lesions in the left anterior insula, left temporal lobe and left inferior cerebellum remain stable in size. Right occipital FLAIR hyperintensity without appreciable enhancement has also remained stable.  Continues following with Dr. Cohen. Remains on anastrazole, herceptin, and perjeta.  Today she denies headaches, nausea, vomiting. Has some intermittent numbness to the right face which is stable. She has back pain to the lower back and bilateral upper and lower extremities. This pain is stable.  5/25/2021- Ms. Cardozo presents today follow up. She continues to follow with Dr. Cohen and continues on Herceptin, Perjeta, and Arimidex.  Today she notes left eye pain, and feels her right eye is bulging. She has left fontal intermittent headaches that do not require medications. Chronic bilateral LE pain. She has numbness to her face.   MRI brain 5/20/2021 showed Abnormal lesions in the posterior fossa and supratentorial region. Some of these lesions have increased in size which is worrisome for progression of patient's underlying disease process. Clinical correlation continued close interval follow-up is recommended.  9/21/2021- Ms. Cardozo presents today for follow up. MRI brain done 8/23/2021 showed decreased size and enhancement of left inferior cerebellar lesion. Grossly stable bilateral temporal and left insular lesions. No new lesions.  Last body imaging CT CAP 5/29/2021 showed Stable findings. Osseous metastatic disease is without change in the chest and abdomen. Continues to follow with Dr. Cohen. Xeloda tucatinib herceptin started 6/15/2021 due to POD in brain however was discontinued in 8/2021 due to fatigue, diarrhea, SILAS, LE swelling, and pain. She is back on anastrazole herceptin perjeta.   Today she is feeling ok. She notes some headaches from time to time which have been stable over a long time. Stable facial numbness. no new nausea, vomiting, weakness. remains bothered by fluid retention which started after taking xeloda/tucatinib.  12/22/2021: Patient presents today for follow-up. She discontinued tucatinib/xeloda due to poor tolerability.  She presents with a new MRI.   MRI Head w/wo IV contrast (12/18/2021) shows 5 SMALL LESIONS AS DESCRIBED, 2 OF WHICH ARE STABLE, 2 WHICH HAVE MILDLY INCREASED IN SIZE, AND ONE WHICH APPEARS NEW. FINDINGS SUGGEST MILD INTERVAL PROGRESSION OF PATIENT'S KNOWN METASTATIC DISEASE. RECOMMEND CLINICAL CORRELATION AND INTERVAL FOLLOW-UP. Today denies denies complaints.   1/19/2022: Pt presents for follow-up. MRI 1/19/22 shows further slight progression of the cerebellar lesion while other lesions remain stable.    3/16/2022: Pt presents for PTE. Completed Linac SRS 2000 cgy/1fx to L cerebellum on 1/25/22. Reports feeling well overall. Having occasional HA (3/10, no medication). Notes mild pain and weakness in L shoulder, but denies general muscle weakness. Continues to have numbness in hands following secondary to chemo. Currently receiving Herceptin, Perjeta, and Anastrazole with Dr. Cohen.  4/14/2022- Ms. Cardozo presents today for PTE. Her MRI was done on 4/13, there is no final MRI but appears to be a good response to the left cerebellar lesion. Continues on kanjinti and perjeta.  CT CAP 3/26/2022 showed Stable examination. Sclerotic densities in bone which have not significantly changed.  Today she feels well. notes slight intermittent headaches, unchanged recently. no new focal weakness. notes a bald spot to her posterior head  7/13/2022 - Ms. Cardozo presents today for follow up.  Continues to follow with Dr. Cohen. Continues on herceptin and xgeva.  Brain MRI done 7/10/2022.  Body imaging 3/2022 stable.  10/26/2022- Ms. Cardozo presents today for follow up.  CT CAP 9/7/2022 showed Stable examination. Sclerotic densities in bone which have not significantly changed.  MRI brain 10/21/2022 showed New punctate focus of enhancement within the right caudate nucleus. Enhancing left insular nodule appears minimally larger measuring 6 x 5 mm, previously measured 5 x 4 mm. Slight increased enhancement associated with the anterior right frontal FLAIR signal abnormality.  Enhancing nodule in the left temporal region appears essentially stable. New linear focus of enhancement just medial to the lesion may be vascular in nature. Stable signal abnormality with vague enhancement anteromedial aspect of the right temporal lobe.  Findings suggest mild disease progression. Recommend clinical correlation and close interval follow-up.  continues on herceptin/perjeta.  Feeling well today. no headaches, no nausea, no focal weakness or confusion or dizziness.  1/25/2023- Ms. Cardozo presents today for follow up. Seen through TELEPHONE for which she consents on 1/25/2023 at 3:10 PM. MRI brain done 12/21/2022 showed Mild interval increase in size of the small focus of enhancement within the right caudate body currently measuring 3 mm, previously 2 mm. Other areas of signal abnormality and enhancement are stable. Please see report for description.  No new lesions seen.  Continues to follow with Dr. Cohen. Continues on Herceptin and Perjeta every 3 weeks. No new body imaging since September 2022, which was stable. Feeling well overall no headaches, no nausea, no focal weakness, no confusion.  3/9/2023 She presents for follow up. At last visit, repeat MRI ordered 2 months from last MRI for concerning increase in size of the right caudate lesion.  2/25/23 Brain MRI IMPRESSION: No cystic change compared with 12/21/2022. 1. Stable bilateral supratentorial enhancing nodules, as described in detail above, compatible with the provided history of metastatic disease. 2. No new lesions visualized.  3/2/2023 CT C/A/P showed stable exam  Today she reports headaches relived with Tylenol or Advil. Today she reported pain to left leg while walking into the building(likely claudication) encouraged to follow up with PCP.  Visit dated 9/19/2023 patient returns for f/u with images for review. Reports intermittent HAs, occasional "I feel I would pass out and double vision also a spinning sensation of the room" about three weeks ago. Today she is w/o those symptoms. Continues to follow with Dr. Cohen on Herceptin/Perjeta/ Anastrozole Xgeva next dose 1/2024  MRI brain ww/o contrast 9/8/2023 IMPRESSION: No hydrocephalus or new enhancing lesions. 5 parenchymal lesions are identified which appear similar to the prior exam of 2/25/2023. Right caudate head lesion is slightly larger measuring 4.8 mm compared with the prior of 3.2 mm.  CT C/A/P 9/1/82023 IMPRESSION: Stable examination as compared to CT 3/2/2023. Sclerotic osseous lesions, without significant change.  Visit dated 11/29/2023   Patient returns for post treatment f/u and progress check after completion of GKRS Right caudate lesion 9/25/2023 2000cgy over 1 Fx. Reports doing well post treatment. Endorses intermittent HAs Denies N/V, unilateral extremity weakness/memory changes/gait disturbance/bowel/bladder dysfunction or other neurologic symptoms. No issues with speech or comprehension. Persistent knee pain for which she gets injections and is currently in PT  Continues to follow with Dr. Cohen on Herceptin/Perjeta every 3 weeks.   Visit dated 1/10/2024   Patient returns for routine follow up to include progress check and review of completed cranial images. Denies N/V, HA/unilateral extremity weakness/memory changes/gait disturbance/bowel/bladder dysfunction or other neurologic symptoms. No issues with speech or comprehension. SHe is w/o any new verbalized concerns at today's visit.  Continues to follow with Dr. Cohen on Herceptin /Perjeta every 3 weeks since 8/2021 MRI brain w w/o contrast 1/8/2024 - stable on my review, pending read  VISIT DATED: 9/17/2024 Patient returns for routine f/u and progress check with cranial images for review. Reports doing well from a neurological standpoint. Does note bilateral leg edema per patient workup thus far negative. Plans to see vascular on 9/20/2024 for further workup. Leg edema does result in difficulty with extended.  Continues to follow with Dr. Cohen on Kanjinti/Perjeta every 3 weeks since 8/2021.  Also, on Anastrozole Last CT C/A/P w w/o contrast 6/20/2024 IMPRESSION: Stable examination. Sclerotic osseous lesions, unchanged.  MRI brain w w/o contrast 9/16/2024 no final read available at this time  VISIT DATED: 12/18/2024 Patient presents for routine f/u and progress check with cranial images for review. Reports she continues to do well. Does note neuropathy of the hands/ feet. Uses a cane for safety. Intermittent headaches. She is w/o any new concerns today. Continues to follow with Dr. Cohen on Herceptin /Perjeta every 3 weeks since 8/2021 with recent CT C/A/P 12/7/2024 w/o any new findings.  MRI brain w w/o contrast 12/16/2024 IMPRESSION: Multiple parenchymal lesions compatible with metastatic disease. Anterior left insular lesion (16-80) appears stable measuring 6 x 3 mm previously 6 x 3 mm. Punctate anterior right frontal cortical lesion (16-30) in retrospect appears stable measuring 2 mm previously 2 mm. Punctate medial right occipital lesion (16-90) appears stable measuring 2 mm. Linear left temporal lesion (16-71) appears stable measuring 12 x 3 mm. Previously described punctate left frontal subcortical lesion no longer seen. No new lesions identified  VISIT DATED: 3/12/2025 Ms. Cardozo presents for routine f/u and progress check with cranial images for review. States neurologically doing well but notes generalized aches/pain for which she uses OTC Tylenol.  Continues to follow with Dr. Cohen on Herceptin /Perjeta every 3 weeks since 8/2021.Also follows with Dr. Whitehead to monitor for Herceptin induced cardiotoxicity which changes made to her medication regimen to include a diuretics.  MRI brain w w/o contrast 3/7/2025 IMPRESSION: Stable small parenchymal lesions. No new lesions identified  VISIT DATED: 6/4/2025 Ms. Cardozo presents for routine f/u and progress evaluation with cranial images for review to ensure continued stability of cranial metastatic disease. States she continues to do well with no new or worsening neurological deficits. Does have intermittent headaches which are short-lived thus not interfering with her quality of life. Does have knee pain for which she gets injections.  Continues to follow with Dr. Cohen on Herceptin /Perjeta every 3 weeks since 8/2021. Awaiting date for next Petct Also follows with Dr. Whitehead to monitor for Herceptin induced cardiotoxicity. ECHO every 3 months. BLE edema on diuretics also saw podiatrist who recommended a vascular workup.  MRI brain w w/o contrast 5/30/2025 final read pending at time of this entry

## 2025-06-04 NOTE — PHYSICAL EXAM
[Obese] : obese [] : no respiratory distress [Heart Rate And Rhythm] : heart rate and rhythm were normal [Oriented To Time, Place, And Person] : oriented to person, place, and time [General Appearance - Alert] : alert [General Appearance - In No Acute Distress] : in no acute distress [de-identified] : uses cane for stability

## 2025-06-18 NOTE — PHYSICAL EXAM
[de-identified] : port in left chest wall c/d/i , chronic ankle/pedal swelling noted  rt. >left.. Chronic stable B/L LE edema  [de-identified] : Right breast- right breast slightly bigger than left breast + skin thickening in the outer lower quadrant with vague fullness. cellulitis resolved. No LN palpable UNCHANGED [de-identified] : bilateral pitting edema+2

## 2025-06-18 NOTE — PHYSICAL EXAM
[de-identified] : port in left chest wall c/d/i , chronic ankle/pedal swelling noted  rt. >left.. Chronic stable B/L LE edema  [de-identified] : bilateral pitting edema+2  [de-identified] : Right breast- right breast slightly bigger than left breast + skin thickening in the outer lower quadrant with vague fullness. cellulitis resolved. No LN palpable UNCHANGED

## 2025-06-18 NOTE — HISTORY OF PRESENT ILLNESS
[de-identified] : Ms. Cardozo is a 74-year-old lady who was recently diagnosed with HER-2 positive breast cancer with brain metastasis. She presented today for  an evaluation and to start chemotherapy. Her oncologic history is as follows:  She presented to  [facility] emergency room on 17 with complaints of aphasia for 2 weeks.  A CT head showed new lesion with mixed attenuation in the left temporofrontal region. MRI had done on 17 showed multiple enhancing parenchymal lesion at the gray-white junction with surrounding vasogenic edema suspicious for metastatic disease. No leptomeningeal enhancement was noted. She also underwent a CT scan of chest abdomen and pelvis on the same day which showed right axillary and pectoral lymphadenopathy, irregular mass in the right breast, 1.5 cm celiac axis lymph node and an omental implant  suspicious for peritoneal carcinomatosis. She was started on Keppra by neurology.  She underwent ultrasound-guided biopsy of the right axillary lymphadenopathy on 5/3/17which showed adenocarcinoma of mammary origin, positive for breast markers. ER 95%, NJ negative and HER-2/douglas 3+  She completed gamma knife radiation treatment for multiple brain metastases on 17 by Dr. Savage. She denies headaches, balance issues, seizures, change in vision, hearing difficulty, memory problems, localized motor weakness  or comprehension problems.  She went for a second opinion at [facility] and they made the same recommendation (THP)  She was seen in 2019 with c/o worsening cough, wheezing and HOLLAND. Chest CT didn't show pna or POD. She is using inhalers now and is feeling much better. Cough and wheezing resolved.  CT 2020- ? liver lesion, MRI recommended. 2/15/2020 MRI ABD Impression: No focal liver lesions are visualized   Patient complaint of worsening generalized body pain 2021.  CT scan 2020 showed stable disease.  She was sent for a PET/CT to evaluate bony disease. PET/CT 2021 images reviewed: She has mild increase in FDG activity in the thoracolumbar spine.  MRI 3/2021 showed arthritis, spinal stenosis and stable bone mets.  Since there is no clear evidence of progression, we will continue with Herceptin and Perjeta.  She will continue anastrozole.  She reports overall body pain has improved without intervention.  She was also given 2 weeks anastrozole break which did not help. Pain has since resolved. She doesn't take pain meds daily. Tylenol once or twice a week. Discussed ortho f/u and PT prn   2021 She has been doing very well with the current regimen.  She underwent a brain MRI 2021 which showed progression of CNS mets and possibility of leptomeningeal disease.  Patient is asymptomatic.  I sent her for CT chest abdomen pelvis and discussed results with her today.  CT scans essentially do not show any evidence of disease progression outside of CNS. Dr. Lundberg discussed the case with me and concern is that she has significant burden of CNS disease which is currently not being controlled with localized radiation treatments.  She received gamma knife to multiple areas in May 2017 and 2018.  Current progression is in some of the treated mets but some of them are new as well.  I discussed MRI brain and CT chest abdomen pelvis findings with the patient as well as her son on the phone.  I recommend to switch therapy to cover CNS disease as well. We reviewed that she has been treated with standard of care first-line regimen based on HUBERT study.  We reviewed results from HER 2 CLIMB study which showed benefit of adding tucatinib to Herceptin and Xeloda.  Study showed improvement in overall survival as well as progression free survival especially in patients with brain metastases.  Side effect of the regimen reviewed with the patient and son.  Patient has multiple medical comorbidities and is as such taking multiple oral medications.  She was very concerned about adding multiple pills to her daily regimen.  Her son agreed to maintain a pillbox and supervise the pill intake.    She will take:  Capecitabine 1500 mg twice daily 1 week on 1 week off. Tucatinib 300 mg twice a day is the recommended dose but given the concern about significant GI toxicity, I recommended she start with dose reduction.  She will take 1 pill twice daily.  She will also take Imodium 4 times a day and report if she has more than 3-4 bowel movements a day.  Use of antinausea, antidiarrheal and prophylactic use of udder cream to prevent hand-foot syndrome was reviewed with the patient as well. She will continue Herceptin every 3 weeks.  She will stop Perjeta and anastrozole.  Addendum: Pharmacy called me on 2021 and reported that patient did not want to start the regimen.  I presented the case in tumor board on 2021 and consensus opinion was to recommend change in treatment and to start XTH to treat CNS disease.  I discussed that with the patient and explained her to start.  We reviewed side effect profile again.  She will start with the lower dose and see me in 1 week.  Depending on tolerability, we will uptitrate to full dose.  She started treatment 6/15/2021. She was extremely concerned about side effects and multiple pills therefore she is started on a lower dose.  21: she reports no significant toxicity. She had mild fatigue but no nausea vomiting diarrhea mouth sores or hand-foot syndrome. She was taking Imodium as prescribed and reports mild constipation. This is her off week for Xeloda. I recommend to increase tucatinib dose 2 pills twice daily(full dose). I will see her next week. If she has no concerning side effects, we will increase Xeloda dose as well. Last week she took Xeloda 2 and 2 pills (1000 bid) due to concerns about toxicity. She is due for Herceptin next week. We will do blood work next week and titrate up the dose as tolerated.   21: She iS here with her  today. Getting Herceptin today. She is on full dose of tucatinib 2 pills twice daily. Reports that constipation is resolved. She had 3 episodes of semisolid bowel movements, improved with Imodium. She denies nausea or vomiting. No other concerning symptoms. I recommend to start Xeloda and increase the dose to 3 pills twice daily 1 week on 1 week off. She will call if any concerning symptoms specifically diarrhea. Change in dose was discussed with the patient and . Written instructions were provided as well. Appointment for 3 weeks Herceptin and echocardiogram was discussed. Addendum: Blood work from 2021 showed rising creatinine to 1.93. She has diabetes and hypertension. Patient did not report severe diarrhea but this rising creatinine is concerning. I recommend that she comes for IV fluid this week and we will repeat CMP. She will hold Xeloda in the meantime. hold and Iv luids.   21 Saw patient in tx room today she is here for IV fluids #3 this as she has ongoing loose stools and decreased appetite x 1 week. On 7/3/21 she was also noted to have hypocalcemia and elevated creatinine. Xeloda was stopped on 21 and patient was instructed to decrease Tucatinib to 1 pill BID. Since decreasing Tucatinib she has about 4 diarrhea episodes daily. Instructed to continue Imodium prn. She reports  cough productive of scant amount of yellow mucous x 4 days no fever mild SOB when going up the stairs but this is not new. No SOB with regular activity.    2021 Today she reports that she is compliant with pills.  She reports severe fatigue,  unable to do household chores, lower extremity swelling, loose watery diarrhea 2-3 times a day, maximum 4 times, taking Imodium 2-3 times a day.  She has nausea even with water, unable to eat much, poor taste.  She is taking antinausea without much relief.  GERD is severe since PPI was held (interaction with Xeloda), not much relieved with Pepcid. Patient was crying today and said she cannot do it anymore.  I recommend to stop Xeloda and tucatinib for now.  She can restart PPI.  She will be treated with Herceptin today.  We will draw routine labs.  Stat BMP showed that creatinine is stable. 3-month brain MRI follow-up is coming up soon.  We will reevaluate after brain MRI.  Options include either switching to Herceptin Perjeta and radiating the brain lesions or consider Enhertu as it does have some CNS penetration. We will hold off CT imaging for now due to slight bump in creatinine.  Echo ordered today   5/3/2023  Patient seen in tx room today s/p syncopal episode/fall 2022 2/2 hypoglycemia. She reports feeling dizzy prior to episode. She was seen in ER CT head 4/10/23: stable No evidence of acute cortical infarction or hemorrhage. No dizziness headache or other neuro symptoms presently. D/w Dr. Noel RICO to proceed with Kanjinti/perjeta today. Reports knee pain and will f/u PCP 23 ECHO 4/10/23 LVEF 61% No CP or SOB Patient had imaging done on 3/2023. Upon review of CT films, my interpretation is that patient has STABLE disease. I reviewed these findings with the patient   2024 Here for Kanjinti perjeta today No cp sob, no GI sx with HP  She is on Xgeva q 6 month and is noted to have mild hypocalcemia. Ca 8.3  Rec to continue Ca suppls 600 mg BID while on Xgeva  Will repeat CMP today.. BP high frequently, WNL today. Rec to see cardioonc. BS better now MRI 10/2022, 2023 stable. Fb Dr Lundberg. MRI 2023 showed POD, s/p SRS 2023, repeat MRI 2024 SD CT 2023 SD , 2024 SD She is on xgeva q6 m as she has been on it for > 4 yrs. Last dental exam 2021. Last Xgeva  23, 24. Next due 2024 ECHO Q3M 23 LVEF 55% to 60%, 10/2023, ECHo due 2024 She reports family issues, her  has worsening Parkinson's disease and 2 falls. son had hernia surgery  24: Patient is here today for Kanjinti/Perjecta- Q3 weeks.  Patient denies any SOB or C/N/V/D. She reports eating well, with no changes in appetite and no weight change.  Patient is on XGEVA, Q 6 months. Next dose due: 24. Patient denies any dental issues.  Patient reports significant pitting edema in her bilateral extremities x 2 months. Denies that it improves with elevation and can be painful. Office to r/o DVT. Bilateral Venous Duplex ordered, ASAP. Referral to Nephrology and Cardiology (appt: ) also placed. Ms. Cardozo reports she discussed this concern with her PCP at the last visit who instructed her to take 2 Hydrochlorothiazide pills daily to see if s/s resolved. Patient reports s/s have remained consistent. Creatinine has progressively increased over the last year.  Ms. Cardozo underwent her last ECHO on 24, next imaging: OVERDUE. Orders placed for ASAP Imaging. Results showin. Left ventricular cavity is normal. Left ventricular wall thickness is mildly increased. Left ventricular systolic function is normal with an ejection fraction of 67 % by Braden's method of disks. Last CT C/A/P: 14; Stable disease Ms. Cardozo reports her son has successfully healed from his hernia s/x . Unfortunately, her  continue to decline and will be entering a Rehabilitation Facility in [place]. Patient also request a letter to be written on behalf of her sister-in-law coming from [place] to be a caregiver to her spouse as she is unable to care for him given her treatment schedule and general overall health.  Treatment held 2/2 overdue ECHO and new s/s of b/l LE swelling (2+pitting) RTC:  2 months with treatment; patient to obtain ECHO and Cardiology clearance prior to next infusion. CT before next visit   2025 Patient is here today for Kanjinti/Perjecta- Q3 weeks. Will repeat imaging studies in 4-6 months. Last CT C/A/P completed 2025; SD noted. Plan to continue HP & Anastrozole until POD. Will order imaging at follow-up visit in May 2025.  Patient denies any Cardiopulmonary s/s or C/N/V/D. She reports she is eating well, with no changes in appetite and no weight change. Patient reported her pain today was a 10/10 concentrating in her back and knees. Discussed adding in breakthrough medication to supplement OTC Tylenol with Oxycodone, 5mg every 6 hours. Patient was relieved and wished for prescription to be sent to [facility] on file. 2 mg IV Morphine given in treatment room. Pain relieved with x 1 dose to 2/10.  Ms. Cardozo is UTD with Cardiology. Patient saw Dr. Holman last on 2025. Patient has longstanding DM2 & HTN, as well as venous insufficiency, diastolic dysfunction (noted on ECHO) with normal PRO-BNP's. LE edema continues to be persistent (+2 on PE today), with some improvement with trial of loop diuretic per patient. Patient was encouraged to follow-up with Vascular so she can be evaluated/measured for compression garments. BP remains slightly elevated, but improved with medication adjustments. Patient is currently taking Losartan 100 mg daily, HCTZ 25 mg daily with 25 mg of Spironolactone PRN, and Carvedilol 3.125 BID. Last ECHO was completed on 2025 with a LVEF of 60%. Repeat imaging Q3 months; imaging ordered/managed by Cardiology.  Patient established care with Dr. Suárez (Nephrology) for CKD. At initial consult, it was thought that the LE edema was unlikely related to her kidney disease. Patient was encouraged to discontinue use of NSAIDS and follow-up in 4-6 months. Since initial evaluation, Creatinine up trending to 1.76 on 2025. Encouraged patient to follow-up with Nephrology at this time. Patient is UTD with Dr. Lundberg; last seen on 2025. Patient denies any neurologic s/s to include: nausea, vomiting, diplopia, blurry vision, unsteady gait or headaches. MR Head last completed 2025; stable disease with no new lesions identified. Patient to undergo repeat testing in 3 months and follow-up accordingly with Dr. Lundberg. RTC: with each infusion; Q3 weeks [FreeTextEntry1] : Started THP on 7/10/17, on HP now\par  Anastrazole 1/2018\par  XTH 6/2021- 8/2021\par  HP 8/2021 [de-identified] : Ms. MONSERRAT CARDOZO is here for follow up visit for metastatic breast cancer. She completed THP 7/2017, HP and Anastrazole. Progression of disease in the brain, started Xeloda Herceptin and tucatinib 6/2021. MRI 8/2021 showed response but pt could not tolerate XHT regimen despite dose reduction. HP restarted 8/2021.  2/2025 Patient had imaging done on 12/2024 . Upon review of CT films, my interpretation is that patient has STABLE disease. I reviewed these findings with the patient Patient is here today for Kanjinti/Perjecta- Q3 weeks.  Patient denies any SOB or C/N/V/D. She reports eating well, with no changes in appetite and no weight change.  Patient is on XGEVA, Q 6 months.  Patient denies any dental issues.  Next DUE july 2025 Last ECHO: Ordered Patient established care with Dr. Holman on 24 June 2024. BP high, rec to f/u with him  Patient established care with Dr. Suárez (Nephrology) for CKD. Cr stable  Otherwise feeling well.  F/b Dr Lundberg for brain MRI and brain metastases   5/28/2025: Patient is here today for Kanjinti/Perjecta- Q3 weeks. Will repeat imaging studies in 4-6 months. Last CT C/A/P completed 7 December 2025; SD noted. Plan to continue HP & Anastrozole until POD. PET/CT ordered today secondary to kidney disease and up trending creatinine values with considerable side effects associated: +3 pitting edema, elevated BP, and 10/10 LE pain.  Patient denies any SOB, s/s or C/N/V/D. She reports she is eating well, with no changes in appetite and no weight change. Patient reported her pain today was a 10/10 concentrating in her knees. Discussed referral to Palliative Care for tighter pain control, but declines today. Refill of 5 mg Oxycodone sent to [facility] on file. 1 mg IV Hydromorphone given in treatment room. Discussed ability to complete ADL's and take care of her ailing spouse. The patient reports she is experiencing some challenges and is working to bring her sister back from her home country.  Patient UTD with PCP; last seen by Dr. Willis on 13 May 2025. Lab work and Bilateral Venous Duplexes ordered. Imaging completed on 23 May 2025; no acute findings. Referral to Vascular Surgery sent; scheduled for 3 Dina 2025. Ms. Cardozo is UTD with Cardiology. Patient saw Dr. Holman last on 14 April 2025. Patient has longstanding DM2 & HTN, as well as venous insufficiency, diastolic dysfunction (noted on ECHO) with normal PRO-BNP's. LE edema continues to be persistent (+3 on PE today), with some improvement with trial of loop diuretic per patient.  BP remains slightly elevated, but improved with medication adjustments. Patient is currently taking Losartan 100 mg daily, HCTZ 25 mg daily with 25 mg of Spironolactone PRN, and Carvedilol 3.125 BID. Last ECHO was completed on 25 March 2025 with a LVEF of 60%. Repeat imaging Q3 months; imaging ordered/managed by Cardiology.  Patient is UTD with Dr. Lundberg; last seen on 13 March 2025. Patient denies any neurologic s/s to include: nausea, vomiting, diplopia, blurry vision, unsteady gait or headaches. MR Head last completed 7 March 2025; stable disease with no new lesions identified. Patient to undergo repeat testing in 3 months and follow-up accordingly with Dr. Lundberg; scheduled for 4 June 2025. RTC: with each infusion; Q3 weeks  6/18/2025: Patient is here today for Kanjinti/Perjeta- Q3 weeks. Will repeat imaging studies in 4-6 months. Last CT C/A/P completed 7 December 2025; SD noted. Plan to continue HP & Anastrozole until POD.  Patient denies any SOB, s/s or C/N/V/D. She reports she is eating well, with no changes in appetite and no weight change. Patient reported her pain today was a 9/10 concentrating in her bilateral knees (Left > Right). Again, discussed referral to Palliative Care for tighter pain control, but declines today. 1 mg IV Hydromorphone given in treatment room. Discussed ability to complete ADL's and take care of her ailing spouse. Patient requesting letter in support of her sister traveling from [place] to assist with care of both her and her  who has end-stage Parkinson's Disease. Letter provided today to patient in hopes her sister will be able to come to the [place] for another 90 days.  PET/CT ordered at last visit secondary to kidney disease and up trending creatinine values with considerable side effects associated: +3 pitting edema, elevated BP, and 10/10 LE pain. Patient was unable to schedule. Office assisted patient in scheduling for: June 27th at [facility].  Patient is UTD with Dr. Lundberg; last seen on 04 JUNE 2025. Patient denies any neurologic s/s to include: nausea, vomiting, diplopia, blurry vision, unsteady gait or headaches. MR Head last completed 30 MAY 2025; redemonstrated multifocal enhancing lesions compatible with patient's history of metastatic disease. Lesions were grossly similar in size and associated edema. Stable encephalomalacia and gliosis in the right medial temporal lobe. No new lesions were noted. Ms. Cardozo is UTD with Cardiology. Patient saw Dr. Holman last on 14 April 2025. Patient has longstanding DM2 & HTN, as well as venous insufficiency, diastolic dysfunction (noted on ECHO) with normal PRO-BNP's. LE edema continues to be persistent (+3 on PE today), with some improvement with trial of loop diuretic per patient.  BP remains slightly elevated, but improved with medication adjustments. Patient is currently taking Losartan 100 mg daily, HCTZ 25 mg daily with 25 mg of Spironolactone PRN, and Carvedilol 3.125 BID. Last ECHO was completed on 25 March 2025 with a LVEF of 60%. Repeat imaging Q3 months; imaging ordered today. Patient next scheduled with Dr. Whitehead on 14 JULY 2025. Encouraged patient to self-schedule. Patient UTD with PCP; last seen by Dr. Willis on 13 May 2025. Lab work and Bilateral Venous Duplexes ordered. Imaging completed on 23 May 2025; no acute findings. Referral to Vascular Surgery sent; scheduled for 24 June 2025. RTC: with each infusion; Q3 weeks

## 2025-06-18 NOTE — HISTORY OF PRESENT ILLNESS
[de-identified] : Ms. Cardozo is a 74-year-old lady who was recently diagnosed with HER-2 positive breast cancer with brain metastasis. She presented today for  an evaluation and to start chemotherapy. Her oncologic history is as follows:  She presented to  [facility] emergency room on 17 with complaints of aphasia for 2 weeks.  A CT head showed new lesion with mixed attenuation in the left temporofrontal region. MRI had done on 17 showed multiple enhancing parenchymal lesion at the gray-white junction with surrounding vasogenic edema suspicious for metastatic disease. No leptomeningeal enhancement was noted. She also underwent a CT scan of chest abdomen and pelvis on the same day which showed right axillary and pectoral lymphadenopathy, irregular mass in the right breast, 1.5 cm celiac axis lymph node and an omental implant  suspicious for peritoneal carcinomatosis. She was started on Keppra by neurology.  She underwent ultrasound-guided biopsy of the right axillary lymphadenopathy on 5/3/17which showed adenocarcinoma of mammary origin, positive for breast markers. ER 95%, WV negative and HER-2/douglas 3+  She completed gamma knife radiation treatment for multiple brain metastases on 17 by Dr. Savage. She denies headaches, balance issues, seizures, change in vision, hearing difficulty, memory problems, localized motor weakness  or comprehension problems.  She went for a second opinion at [facility] [place] [place] and they made the same recommendation (THP)  She was seen in 2019 with c/o worsening cough, wheezing and HOLLAND. Chest CT didn't show pna or POD. She is using inhalers now and is feeling much better. Cough and wheezing resolved.  CT 2020- ? liver lesion, MRI recommended. 2/15/2020 MRI ABD Impression: No focal liver lesions are visualized   Patient complaint of worsening generalized body pain 2021.  CT scan 2020 showed stable disease.  She was sent for a PET/CT to evaluate bony disease. PET/CT 2021 images reviewed: She has mild increase in FDG activity in the thoracolumbar spine.  MRI 3/2021 showed arthritis, spinal stenosis and stable bone mets.  Since there is no clear evidence of progression, we will continue with Herceptin and Perjeta.  She will continue anastrozole.  She reports overall body pain has improved without intervention.  She was also given 2 weeks anastrozole break which did not help. Pain has since resolved. She doesn't take pain meds daily. Tylenol once or twice a week. Discussed ortho f/u and PT prn   2021 She has been doing very well with the current regimen.  She underwent a brain MRI 2021 which showed progression of CNS mets and possibility of leptomeningeal disease.  Patient is asymptomatic.  I sent her for CT chest abdomen pelvis and discussed results with her today.  CT scans essentially do not show any evidence of disease progression outside of CNS. Dr. Lundberg discussed the case with me and concern is that she has significant burden of CNS disease which is currently not being controlled with localized radiation treatments.  She received gamma knife to multiple areas in May 2017 and 2018.  Current progression is in some of the treated mets but some of them are new as well.  I discussed MRI brain and CT chest abdomen pelvis findings with the patient as well as her son on the phone.  I recommend to switch therapy to cover CNS disease as well. We reviewed that she has been treated with standard of care first-line regimen based on HUBERT study.  We reviewed results from HER 2 CLIMB study which showed benefit of adding tucatinib to Herceptin and Xeloda.  Study showed improvement in overall survival as well as progression free survival especially in patients with brain metastases.  Side effect of the regimen reviewed with the patient and son.  Patient has multiple medical comorbidities and is as such taking multiple oral medications.  She was very concerned about adding multiple pills to her daily regimen.  Her son agreed to maintain a pillbox and supervise the pill intake.    She will take:  Capecitabine 1500 mg twice daily 1 week on 1 week off. Tucatinib 300 mg twice a day is the recommended dose but given the concern about significant GI toxicity, I recommended she start with dose reduction.  She will take 1 pill twice daily.  She will also take Imodium 4 times a day and report if she has more than 3-4 bowel movements a day.  Use of antinausea, antidiarrheal and prophylactic use of udder cream to prevent hand-foot syndrome was reviewed with the patient as well. She will continue Herceptin every 3 weeks.  She will stop Perjeta and anastrozole.  Addendum: Pharmacy called me on 2021 and reported that patient did not want to start the regimen.  I presented the case in tumor board on 2021 and consensus opinion was to recommend change in treatment and to start XTH to treat CNS disease.  I discussed that with the patient and explained her to start.  We reviewed side effect profile again.  She will start with the lower dose and see me in 1 week.  Depending on tolerability, we will uptitrate to full dose.  She started treatment 6/15/2021. She was extremely concerned about side effects and multiple pills therefore she is started on a lower dose.  21: she reports no significant toxicity. She had mild fatigue but no nausea vomiting diarrhea mouth sores or hand-foot syndrome. She was taking Imodium as prescribed and reports mild constipation. This is her off week for Xeloda. I recommend to increase tucatinib dose 2 pills twice daily(full dose). I will see her next week. If she has no concerning side effects, we will increase Xeloda dose as well. Last week she took Xeloda 2 and 2 pills (1000 bid) due to concerns about toxicity. She is due for Herceptin next week. We will do blood work next week and titrate up the dose as tolerated.   21: She iS here with her  today. Getting Herceptin today. She is on full dose of tucatinib 2 pills twice daily. Reports that constipation is resolved. She had 3 episodes of semisolid bowel movements, improved with Imodium. She denies nausea or vomiting. No other concerning symptoms. I recommend to start Xeloda and increase the dose to 3 pills twice daily 1 week on 1 week off. She will call if any concerning symptoms specifically diarrhea. Change in dose was discussed with the patient and . Written instructions were provided as well. Appointment for 3 weeks Herceptin and echocardiogram was discussed. Addendum: Blood work from 2021 showed rising creatinine to 1.93. She has diabetes and hypertension. Patient did not report severe diarrhea but this rising creatinine is concerning. I recommend that she comes for IV fluid this week and we will repeat CMP. She will hold Xeloda in the meantime. hold and Iv luids.   21 Saw patient in tx room today she is here for IV fluids #3 this as she has ongoing loose stools and decreased appetite x 1 week. On 7/3/21 she was also noted to have hypocalcemia and elevated creatinine. Xeloda was stopped on 21 and patient was instructed to decrease Tucatinib to 1 pill BID. Since decreasing Tucatinib she has about 4 diarrhea episodes daily. Instructed to continue Imodium prn. She reports  cough productive of scant amount of yellow mucous x 4 days no fever mild SOB when going up the stairs but this is not new. No SOB with regular activity.    2021 Today she reports that she is compliant with pills.  She reports severe fatigue,  unable to do household chores, lower extremity swelling, loose watery diarrhea 2-3 times a day, maximum 4 times, taking Imodium 2-3 times a day.  She has nausea even with water, unable to eat much, poor taste.  She is taking antinausea without much relief.  GERD is severe since PPI was held (interaction with Xeloda), not much relieved with Pepcid. Patient was crying today and said she cannot do it anymore.  I recommend to stop Xeloda and tucatinib for now.  She can restart PPI.  She will be treated with Herceptin today.  We will draw routine labs.  Stat BMP showed that creatinine is stable. 3-month brain MRI follow-up is coming up soon.  We will reevaluate after brain MRI.  Options include either switching to Herceptin Perjeta and radiating the brain lesions or consider Enhertu as it does have some CNS penetration. We will hold off CT imaging for now due to slight bump in creatinine.  Echo ordered today   5/3/2023  Patient seen in tx room today s/p syncopal episode/fall 2022 2/2 hypoglycemia. She reports feeling dizzy prior to episode. She was seen in ER CT head 4/10/23: stable No evidence of acute cortical infarction or hemorrhage. No dizziness headache or other neuro symptoms presently. D/w Dr. Noel RICO to proceed with Kanjinti/perjeta today. Reports knee pain and will f/u PCP 23 ECHO 4/10/23 LVEF 61% No CP or SOB Patient had imaging done on 3/2023. Upon review of CT films, my interpretation is that patient has STABLE disease. I reviewed these findings with the patient   2024 Here for Kanjinti perjeta today No cp sob, no GI sx with HP  She is on Xgeva q 6 month and is noted to have mild hypocalcemia. Ca 8.3  Rec to continue Ca suppls 600 mg BID while on Xgeva  Will repeat CMP today.. BP high frequently, WNL today. Rec to see cardioonc. BS better now MRI 10/2022, 2023 stable. Fb Dr Lundberg. MRI 2023 showed POD, s/p SRS 2023, repeat MRI 2024 SD CT 2023 SD , 2024 SD She is on xgeva q6 m as she has been on it for > 4 yrs. Last dental exam 2021. Last Xgeva  23, 24. Next due 2024 ECHO Q3M 23 LVEF 55% to 60%, 10/2023, ECHo due 2024 She reports family issues, her  has worsening Parkinson's disease and 2 falls. son had hernia surgery  24: Patient is here today for Kanjinti/Perjecta- Q3 weeks.  Patient denies any SOB or C/N/V/D. She reports eating well, with no changes in appetite and no weight change.  Patient is on XGEVA, Q 6 months. Next dose due: 24. Patient denies any dental issues.  Patient reports significant pitting edema in her bilateral extremities x 2 months. Denies that it improves with elevation and can be painful. Office to r/o DVT. Bilateral Venous Duplex ordered, ASAP. Referral to Nephrology and Cardiology (appt: ) also placed. Ms. Cardozo reports she discussed this concern with her PCP at the last visit who instructed her to take 2 Hydrochlorothiazide pills daily to see if s/s resolved. Patient reports s/s have remained consistent. Creatinine has progressively increased over the last year.  Ms. Cardozo underwent her last ECHO on 24, next imaging: OVERDUE. Orders placed for ASAP Imaging. Results showin. Left ventricular cavity is normal. Left ventricular wall thickness is mildly increased. Left ventricular systolic function is normal with an ejection fraction of 67 % by Braden's method of disks. Last CT C/A/P: 14; Stable disease Ms. Cardozo reports her son has successfully healed from his hernia s/x . Unfortunately, her  continue to decline and will be entering a Rehabilitation Facility in [place]. Patient also request a letter to be written on behalf of her sister-in-law coming from [place] to be a caregiver to her spouse as she is unable to care for him given her treatment schedule and general overall health.  Treatment held 2/2 overdue ECHO and new s/s of b/l LE swelling (2+pitting) RTC:  2 months with treatment; patient to obtain ECHO and Cardiology clearance prior to next infusion. CT before next visit   2025 Patient is here today for Kanjinti/Perjecta- Q3 weeks. Will repeat imaging studies in 4-6 months. Last CT C/A/P completed 2025; SD noted. Plan to continue HP & Anastrozole until POD. Will order imaging at follow-up visit in May 2025.  Patient denies any Cardiopulmonary s/s or C/N/V/D. She reports she is eating well, with no changes in appetite and no weight change. Patient reported her pain today was a 10/10 concentrating in her back and knees. Discussed adding in breakthrough medication to supplement OTC Tylenol with Oxycodone, 5mg every 6 hours. Patient was relieved and wished for prescription to be sent to [facility] on file. 2 mg IV Morphine given in treatment room. Pain relieved with x 1 dose to 2/10.  Ms. Cardozo is UTD with Cardiology. Patient saw Dr. Holman last on 2025. Patient has longstanding DM2 & HTN, as well as venous insufficiency, diastolic dysfunction (noted on ECHO) with normal PRO-BNP's. LE edema continues to be persistent (+2 on PE today), with some improvement with trial of loop diuretic per patient. Patient was encouraged to follow-up with Vascular so she can be evaluated/measured for compression garments. BP remains slightly elevated, but improved with medication adjustments. Patient is currently taking Losartan 100 mg daily, HCTZ 25 mg daily with 25 mg of Spironolactone PRN, and Carvedilol 3.125 BID. Last ECHO was completed on 2025 with a LVEF of 60%. Repeat imaging Q3 months; imaging ordered/managed by Cardiology.  Patient established care with Dr. Suárez (Nephrology) for CKD. At initial consult, it was thought that the LE edema was unlikely related to her kidney disease. Patient was encouraged to discontinue use of NSAIDS and follow-up in 4-6 months. Since initial evaluation, Creatinine up trending to 1.76 on 2025. Encouraged patient to follow-up with Nephrology at this time. Patient is UTD with Dr. Lundberg; last seen on 2025. Patient denies any neurologic s/s to include: nausea, vomiting, diplopia, blurry vision, unsteady gait or headaches. MR Head last completed 2025; stable disease with no new lesions identified. Patient to undergo repeat testing in 3 months and follow-up accordingly with Dr. Lundberg. RTC: with each infusion; Q3 weeks [FreeTextEntry1] : Started THP on 7/10/17, on HP now\par  Anastrazole 1/2018\par  XTH 6/2021- 8/2021\par  HP 8/2021 [de-identified] : Ms. MONSERRAT CARDOZO is here for follow up visit for metastatic breast cancer. She completed THP 7/2017, HP and Anastrazole. Progression of disease in the brain, started Xeloda Herceptin and tucatinib 6/2021. MRI 8/2021 showed response but pt could not tolerate XHT regimen despite dose reduction. HP restarted 8/2021.  2/2025 Patient had imaging done on 12/2024 . Upon review of CT films, my interpretation is that patient has STABLE disease. I reviewed these findings with the patient Patient is here today for Kanjinti/Perjecta- Q3 weeks.  Patient denies any SOB or C/N/V/D. She reports eating well, with no changes in appetite and no weight change.  Patient is on XGEVA, Q 6 months.  Patient denies any dental issues.  Next DUE july 2025 Last ECHO: Ordered Patient established care with Dr. Holman on 24 June 2024. BP high, rec to f/u with him  Patient established care with Dr. Suárez (Nephrology) for CKD. Cr stable  Otherwise feeling well.  F/b Dr Lundberg for brain MRI and brain metastases   5/28/2025: Patient is here today for Kanjinti/Perjecta- Q3 weeks. Will repeat imaging studies in 4-6 months. Last CT C/A/P completed 7 December 2025; SD noted. Plan to continue HP & Anastrozole until POD. PET/CT ordered today secondary to kidney disease and up trending creatinine values with considerable side effects associated: +3 pitting edema, elevated BP, and 10/10 LE pain.  Patient denies any SOB, s/s or C/N/V/D. She reports she is eating well, with no changes in appetite and no weight change. Patient reported her pain today was a 10/10 concentrating in her knees. Discussed referral to Palliative Care for tighter pain control, but declines today. Refill of 5 mg Oxycodone sent to [facility] on file. 1 mg IV Hydromorphone given in treatment room. Discussed ability to complete ADL's and take care of her ailing spouse. The patient reports she is experiencing some challenges and is working to bring her sister back from her home country.  Patient UTD with PCP; last seen by Dr. Willis on 13 May 2025. Lab work and Bilateral Venous Duplexes ordered. Imaging completed on 23 May 2025; no acute findings. Referral to Vascular Surgery sent; scheduled for 3 Dina 2025. Ms. Cardozo is UTD with Cardiology. Patient saw Dr. Holman last on 14 April 2025. Patient has longstanding DM2 & HTN, as well as venous insufficiency, diastolic dysfunction (noted on ECHO) with normal PRO-BNP's. LE edema continues to be persistent (+3 on PE today), with some improvement with trial of loop diuretic per patient.  BP remains slightly elevated, but improved with medication adjustments. Patient is currently taking Losartan 100 mg daily, HCTZ 25 mg daily with 25 mg of Spironolactone PRN, and Carvedilol 3.125 BID. Last ECHO was completed on 25 March 2025 with a LVEF of 60%. Repeat imaging Q3 months; imaging ordered/managed by Cardiology.  Patient is UTD with Dr. Lundberg; last seen on 13 March 2025. Patient denies any neurologic s/s to include: nausea, vomiting, diplopia, blurry vision, unsteady gait or headaches. MR Head last completed 7 March 2025; stable disease with no new lesions identified. Patient to undergo repeat testing in 3 months and follow-up accordingly with Dr. Lundberg; scheduled for 4 June 2025. RTC: with each infusion; Q3 weeks  6/18/2025: Patient is here today for Kanjinti/Perjeta- Q3 weeks. Will repeat imaging studies in 4-6 months. Last CT C/A/P completed 7 December 2025; SD noted. Plan to continue HP & Anastrozole until POD.  Patient denies any SOB, s/s or C/N/V/D. She reports she is eating well, with no changes in appetite and no weight change. Patient reported her pain today was a 9/10 concentrating in her bilateral knees (Left > Right). Again, discussed referral to Palliative Care for tighter pain control, but declines today. 1 mg IV Hydromorphone given in treatment room. Discussed ability to complete ADL's and take care of her ailing spouse. Patient requesting letter in support of her sister traveling from [place] to assist with care of both her and her  who has end-stage Parkinson's Disease. Letter provided today to patient in hopes her sister will be able to come to the [place] for another 90 days.  PET/CT ordered at last visit secondary to kidney disease and up trending creatinine values with considerable side effects associated: +3 pitting edema, elevated BP, and 10/10 LE pain. Patient was unable to schedule. Office assisted patient in scheduling for: June 27th at [facility].  Patient is UTD with Dr. Lundberg; last seen on 04 JUNE 2025. Patient denies any neurologic s/s to include: nausea, vomiting, diplopia, blurry vision, unsteady gait or headaches. MR Head last completed 30 MAY 2025; redemonstrated multifocal enhancing lesions compatible with patient's history of metastatic disease. Lesions were grossly similar in size and associated edema. Stable encephalomalacia and gliosis in the right medial temporal lobe. No new lesions were noted. Ms. Cardozo is UTD with Cardiology. Patient saw Dr. Holman last on 14 April 2025. Patient has longstanding DM2 & HTN, as well as venous insufficiency, diastolic dysfunction (noted on ECHO) with normal PRO-BNP's. LE edema continues to be persistent (+3 on PE today), with some improvement with trial of loop diuretic per patient.  BP remains slightly elevated, but improved with medication adjustments. Patient is currently taking Losartan 100 mg daily, HCTZ 25 mg daily with 25 mg of Spironolactone PRN, and Carvedilol 3.125 BID. Last ECHO was completed on 25 March 2025 with a LVEF of 60%. Repeat imaging Q3 months; imaging ordered today. Patient next scheduled with Dr. Whitehead on 14 JULY 2025. Encouraged patient to self-schedule. Patient UTD with PCP; last seen by Dr. Willis on 13 May 2025. Lab work and Bilateral Venous Duplexes ordered. Imaging completed on 23 May 2025; no acute findings. Referral to Vascular Surgery sent; scheduled for 24 June 2025. RTC: with each infusion; Q3 weeks

## 2025-06-18 NOTE — ASSESSMENT
[FreeTextEntry1] : This is a 75-year-old very pleasant  lady, with medical history significant for diabetes, dyslipidemia, hypertension and hypothyroidism, who is diagnosed with metastatic stage IV ER positive, PA negative, HER-2/douglas positive breast cancer. She presented with symptomatic brain metastasis and completed gamma knife radiosurgery to the brain metastases. She also has bone metastases, lymph node metastasis and peritoneal metastasis. She started THP on 7/10/17. Excellent response to chemo clinically, inflammatory changes resolved, CT scans after 4 cycles showed excellent response except one new sclerotic focus on L3 which was radiated. PET 3/2018 showed good response, Brain MRI shows new small lesion. She is on Arimidex + Herceptin, Perjeta. Progression of disease in the brain, started Xeloda Herceptin and tucatinib 6/2021.  METASTATIC BREAST CANCER WITH BRAIN METASTASES: Progression of disease in the brain, started Xeloda Herceptin and Tucatinib 6/2021. MRI 8/2021 showed response but pt could not tolerate XHT regimen despite dose reduction. HP restarted 8/2021. Patient is tolerating Anastrozole, Herceptin & Perjeta.  6/18/2025: Patient is here today for Kanjinti/Perjeta- Q3 weeks. Will repeat imaging studies in 4-6 months. Last CT C/A/P completed 7 December 2025; SD noted. Plan to continue HP & Anastrozole until POD.   Patient denies any SOB, s/s or C/N/V/D. She reports she is eating well, with no changes in appetite and no weight change. Patient reported her pain today was a 9/10 concentrating in her bilateral knees (Left > Right). Again, discussed referral to Palliative Care for tighter pain control, but declines today. 1 mg IV Hydromorphone given in treatment room. Discussed ability to complete ADL's and take care of her ailing spouse. Patient requesting letter in support of her sister traveling from [place] to assist with care of both her and her  who has end-stage Parkinson's Disease. Letter provided today to patient in hopes her sister will be able to come to the [place] for another 90 days.   PET/CT ordered at last visit secondary to kidney disease and up trending creatinine values with considerable side effects associated: +3 pitting edema, elevated BP, and 10/10 LE pain. Patient was unable to schedule. Office assisted patient in scheduling for: June 27th at [facility].   BRAIN METASTASES: MRI 9/2023 POD- s/p SRS MRI 12/2024 SD MRI 3/2024 SD MRI 5/2025 SD  Patient is UTD with Dr. Lundberg; last seen on 04 JUNE 2025. Patient denies any neurologic s/s to include: nausea, vomiting, diplopia, blurry vision, unsteady gait or headaches. MR Head last completed 30 MAY 2025; redemonstrated multifocal enhancing lesions compatible with patient's history of metastatic disease. Lesions were grossly similar in size and associated edema. Stable encephalomalacia and gliosis in the right medial temporal lobe. No new lesions were noted.  HTN & RISK FOR CARDIOTOXICITY: Ms. Cardozo is UTD with Cardiology. Patient saw Dr. Holman last on 14 April 2025. Patient has longstanding DM2 & HTN, as well as venous insufficiency, diastolic dysfunction (noted on ECHO) with normal PRO-BNP's. LE edema continues to be persistent (+3 on PE today), with some improvement with trial of loop diuretic per patient.  BP remains slightly elevated, but improved with medication adjustments. Patient is currently taking Losartan 100 mg daily, HCTZ 25 mg daily with 25 mg of Spironolactone PRN, and Carvedilol 3.125 BID. Last ECHO was completed on 25 March 2025 with a LVEF of 60%. Repeat imaging Q3 months; imaging ordered today. Patient next scheduled with Dr. Whitehead on 14 JULY 2025. Encouraged patient to self-schedule.  BONE METASTASES: She is on XGEVA Q6M. She reports she saw dental 4/2021 and exam was good. Last XGEVA given: 1/2025; next due: 7/30/2025  PERITONEAL METASTASES: No ascites on PE. Will continue to monitor.  DIABETES: Patient established care with Dr. Suárez (Nephrology) for CKD. At initial consult, it was thought that the LE edema was unlikely related to her kidney disease. Patient was encouraged to discontinue use of NSAIDS and follow-up in 4-6 months. Since initial evaluation, Creatinine up trending to 1.76 on 14 April 2025. Encourage patient at each visit to follow-up with Nephrology at this time.  - Elevated tumor markers: stable with minor fluctuations noted. Trend q 2 months  Continue Grisel Garrido Q3 weeks; appointments made through August 2025 XGEVA Q6 months; next due July 30, 2025  RTC 3 weeks

## 2025-06-18 NOTE — ASSESSMENT
[FreeTextEntry1] : This is a 75-year-old very pleasant  lady, with medical history significant for diabetes, dyslipidemia, hypertension and hypothyroidism, who is diagnosed with metastatic stage IV ER positive, ND negative, HER-2/douglas positive breast cancer. She presented with symptomatic brain metastasis and completed gamma knife radiosurgery to the brain metastases. She also has bone metastases, lymph node metastasis and peritoneal metastasis. She started THP on 7/10/17. Excellent response to chemo clinically, inflammatory changes resolved, CT scans after 4 cycles showed excellent response except one new sclerotic focus on L3 which was radiated. PET 3/2018 showed good response, Brain MRI shows new small lesion. She is on Arimidex + Herceptin, Perjeta. Progression of disease in the brain, started Xeloda Herceptin and tucatinib 6/2021.  METASTATIC BREAST CANCER WITH BRAIN METASTASES: Progression of disease in the brain, started Xeloda Herceptin and Tucatinib 6/2021. MRI 8/2021 showed response but pt could not tolerate XHT regimen despite dose reduction. HP restarted 8/2021. Patient is tolerating Anastrozole, Herceptin & Perjeta.  6/18/2025: Patient is here today for Kanjinti/Perjeta- Q3 weeks. Will repeat imaging studies in 4-6 months. Last CT C/A/P completed 7 December 2025; SD noted. Plan to continue HP & Anastrozole until POD.   Patient denies any SOB, s/s or C/N/V/D. She reports she is eating well, with no changes in appetite and no weight change. Patient reported her pain today was a 9/10 concentrating in her bilateral knees (Left > Right). Again, discussed referral to Palliative Care for tighter pain control, but declines today. 1 mg IV Hydromorphone given in treatment room. Discussed ability to complete ADL's and take care of her ailing spouse. Patient requesting letter in support of her sister traveling from [place] to assist with care of both her and her  who has end-stage Parkinson's Disease. Letter provided today to patient in hopes her sister will be able to come to the [place] for another 90 days.   PET/CT ordered at last visit secondary to kidney disease and up trending creatinine values with considerable side effects associated: +3 pitting edema, elevated BP, and 10/10 LE pain. Patient was unable to schedule. Office assisted patient in scheduling for: June 27th at [facility].   BRAIN METASTASES: MRI 9/2023 POD- s/p SRS MRI 12/2024 SD MRI 3/2024 SD MRI 5/2025 SD  Patient is UTD with Dr. Lundberg; last seen on 04 JUNE 2025. Patient denies any neurologic s/s to include: nausea, vomiting, diplopia, blurry vision, unsteady gait or headaches. MR Head last completed 30 MAY 2025; redemonstrated multifocal enhancing lesions compatible with patient's history of metastatic disease. Lesions were grossly similar in size and associated edema. Stable encephalomalacia and gliosis in the right medial temporal lobe. No new lesions were noted.  HTN & RISK FOR CARDIOTOXICITY: Ms. Cardozo is UTD with Cardiology. Patient saw Dr. Holman last on 14 April 2025. Patient has longstanding DM2 & HTN, as well as venous insufficiency, diastolic dysfunction (noted on ECHO) with normal PRO-BNP's. LE edema continues to be persistent (+3 on PE today), with some improvement with trial of loop diuretic per patient.  BP remains slightly elevated, but improved with medication adjustments. Patient is currently taking Losartan 100 mg daily, HCTZ 25 mg daily with 25 mg of Spironolactone PRN, and Carvedilol 3.125 BID. Last ECHO was completed on 25 March 2025 with a LVEF of 60%. Repeat imaging Q3 months; imaging ordered today. Patient next scheduled with Dr. Whitehead on 14 JULY 2025. Encouraged patient to self-schedule.  BONE METASTASES: She is on XGEVA Q6M. She reports she saw dental 4/2021 and exam was good. Last XGEVA given: 1/2025; next due: 7/30/2025  PERITONEAL METASTASES: No ascites on PE. Will continue to monitor.  DIABETES: Patient established care with Dr. Suárez (Nephrology) for CKD. At initial consult, it was thought that the LE edema was unlikely related to her kidney disease. Patient was encouraged to discontinue use of NSAIDS and follow-up in 4-6 months. Since initial evaluation, Creatinine up trending to 1.76 on 14 April 2025. Encourage patient at each visit to follow-up with Nephrology at this time.  - Elevated tumor markers: stable with minor fluctuations noted. Trend q 2 months  Continue Grisel Garrido Q3 weeks; appointments made through August 2025 XGEVA Q6 months; next due July 30, 2025  RTC 3 weeks

## 2025-07-09 NOTE — HISTORY OF PRESENT ILLNESS
[Patient Refusal] : Patient refused psychosocial distress assessment [ECOG Performance Status: 1 - Restricted in physically strenuous activity but ambulatory and able to carry out work of a light or sedentary nature] : Performance Status: 1 - Restricted in physically strenuous activity but ambulatory and able to carry out work of a light or sedentary nature, e.g., light house work, office work [IV] : IV [de-identified] : Ms. Cardozo is a 74-year-old lady who was recently diagnosed with HER-2 positive breast cancer with brain metastasis. She presented today for  an evaluation and to start chemotherapy. Her oncologic history is as follows:  She presented to  [facility] emergency room on 17 with complaints of aphasia for 2 weeks.  A CT head showed new lesion with mixed attenuation in the left temporofrontal region. MRI had done on 17 showed multiple enhancing parenchymal lesion at the gray-white junction with surrounding vasogenic edema suspicious for metastatic disease. No leptomeningeal enhancement was noted. She also underwent a CT scan of chest abdomen and pelvis on the same day which showed right axillary and pectoral lymphadenopathy, irregular mass in the right breast, 1.5 cm celiac axis lymph node and an omental implant  suspicious for peritoneal carcinomatosis. She was started on Keppra by neurology.  She underwent ultrasound-guided biopsy of the right axillary lymphadenopathy on 5/3/17which showed adenocarcinoma of mammary origin, positive for breast markers. ER 95%, CA negative and HER-2/duoglas 3+  She completed gamma knife radiation treatment for multiple brain metastases on 17 by Dr. Savage. She denies headaches, balance issues, seizures, change in vision, hearing difficulty, memory problems, localized motor weakness  or comprehension problems.  She went [place] [facility] [place] and [place] made the same recommendation (THP)  She was seen in 2019 with c/o worsening cough, wheezing and HOLLAND. Chest CT didn't show pna or POD. She is using inhalers now and is feeling much better. Cough and wheezing resolved.  CT 2020- ? liver lesion, MRI recommended. 2/15/2020 MRI ABD Impression: No focal liver lesions are visualized   Patient complaint of worsening generalized body pain 2021.  CT scan 2020 showed stable disease.  She was sent for a PET/CT to evaluate bony disease. PET/CT 2021 images reviewed: She has mild increase in FDG activity in the thoracolumbar spine.  MRI 3/2021 showed arthritis, spinal stenosis and stable bone mets.  Since there is no clear evidence of progression, we will continue with Herceptin and Perjeta.  She will continue anastrozole.  She reports overall body pain has improved without intervention.  She was also given 2 weeks anastrozole break which did not help. Pain has since resolved. She doesn't take pain meds daily. Tylenol once or twice a week. Discussed ortho f/u and PT prn   2021 She has been doing very well with the current regimen.  She underwent a brain MRI 2021 which showed progression of CNS mets and possibility of leptomeningeal disease.  Patient is asymptomatic.  I sent her for CT chest abdomen pelvis and discussed results with her today.  CT scans essentially do not show any evidence of disease progression outside of CNS. Dr. Lundberg discussed the case with me and concern is that she has significant burden of CNS disease which is currently not being controlled with localized radiation treatments.  She received gamma knife to multiple areas in May 2017 and 2018.  Current progression is in some of the treated mets but some of them are new as well.  I discussed MRI brain and CT chest abdomen pelvis findings with the patient as well as her son on the phone.  I recommend to switch therapy to cover CNS disease as well. We reviewed that she has been treated with standard of care first-line regimen based on HUBERT study.  We reviewed results from HER 2 CLIMB study which showed benefit of adding tucatinib to Herceptin and Xeloda.  Study showed improvement in overall survival as well as progression free survival especially in patients with brain metastases.  Side effect of the regimen reviewed with the patient and son.  Patient has multiple medical comorbidities and is as such taking multiple oral medications.  She was very concerned about adding multiple pills to her daily regimen.  Her son agreed to maintain a pillbox and supervise the pill intake.    She will take:  Capecitabine 1500 mg twice daily 1 week on 1 week off. Tucatinib 300 mg twice a day is the recommended dose but given the concern about significant GI toxicity, I recommended she start with dose reduction.  She will take 1 pill twice daily.  She will also take Imodium 4 times a day and report if she has more than 3-4 bowel movements a day.  Use of antinausea, antidiarrheal and prophylactic use of udder cream to prevent hand-foot syndrome was reviewed with the patient as well. She will continue Herceptin every 3 weeks.  She will stop Perjeta and anastrozole.  Addendum: Pharmacy called me on 2021 and reported that patient did not want to start the regimen.  I presented the case in tumor board on 2021 and consensus opinion was to recommend change in treatment and to start XTH to treat CNS disease.  I discussed that with the patient and explained her to start.  We reviewed side effect profile again.  She will start with the lower dose and see me in 1 week.  Depending on tolerability, we will uptitrate to full dose.  She started treatment 6/15/2021. She was extremely concerned about side effects and multiple pills therefore she is started on a lower dose.  21: she reports no significant toxicity. She had mild fatigue but no nausea vomiting diarrhea mouth sores or hand-foot syndrome. She was taking Imodium as prescribed and reports mild constipation. This is her off week for Xeloda. I recommend to increase tucatinib dose 2 pills twice daily(full dose). I will see her next week. If she has no concerning side effects, we will increase Xeloda dose as well. Last week she took Xeloda 2 and 2 pills (1000 bid) due to concerns about toxicity. She is due for Herceptin next week. We will do blood work next week and titrate up the dose as tolerated.   21: She iS here with her  today. Getting Herceptin today. She is on full dose of tucatinib 2 pills twice daily. Reports that constipation is resolved. She had 3 episodes of semisolid bowel movements, improved with Imodium. She denies nausea or vomiting. No other concerning symptoms. I recommend to start Xeloda and increase the dose to 3 pills twice daily 1 week on 1 week off. She will call if any concerning symptoms specifically diarrhea. Change in dose was discussed with the patient and . Written instructions were provided as well. Appointment for 3 weeks Herceptin and echocardiogram was discussed. Addendum: Blood work from 2021 showed rising creatinine to 1.93. She has diabetes and hypertension. Patient did not report severe diarrhea but this rising creatinine is concerning. I recommend that she comes for IV fluid this week and we will repeat CMP. She will hold Xeloda in the meantime. hold and Iv luids.   21 Saw patient in tx room today she is here for IV fluids #3 this as she has ongoing loose stools and decreased appetite x 1 week. On 7/3/21 she was also noted to have hypocalcemia and elevated creatinine. Xeloda was stopped on 21 and patient was instructed to decrease Tucatinib to 1 pill BID. Since decreasing Tucatinib she has about 4 diarrhea episodes daily. Instructed to continue Imodium prn. She reports  cough productive of scant amount of yellow mucous x 4 days no fever mild SOB when going up the stairs but this is not new. No SOB with regular activity.    2021 Today she reports that she is compliant with pills.  She reports severe fatigue,  unable to do household chores, lower extremity swelling, loose watery diarrhea 2-3 times a day, maximum 4 times, taking Imodium 2-3 times a day.  She has nausea even with water, unable to eat much, poor taste.  She is taking antinausea without much relief.  GERD is severe since PPI was held (interaction with Xeloda), not much relieved with Pepcid. Patient was crying today and said she cannot do it anymore.  I recommend to stop Xeloda and tucatinib for now.  She can restart PPI.  She will be treated with Herceptin today.  We will draw routine labs.  Stat BMP showed that creatinine is stable. 3-month brain MRI follow-up is coming up soon.  We will reevaluate after brain MRI.  Options include either switching to Herceptin Perjeta and radiating the brain lesions or consider Enhertu as it does have some CNS penetration. We will hold off CT imaging for now due to slight bump in creatinine.  Echo ordered today   5/3/2023  Patient seen in tx room today s/p syncopal episode/fall 2022 2/2 hypoglycemia. She reports feeling dizzy prior to episode. She was seen in ER CT head 4/10/23: stable No evidence of acute cortical infarction or hemorrhage. No dizziness headache or other neuro symptoms presently. D/w Dr. Noel RICO to proceed with Kanjinti/perjeta today. Reports knee pain and will f/u PCP 23 ECHO 4/10/23 LVEF 61% No CP or SOB Patient had imaging done on 3/2023. Upon review of CT films, my interpretation is that patient has STABLE disease. I reviewed these findings with the patient   2024 Here for Kanjinti perjeta today No cp sob, no GI sx with HP  She is on Xgeva q 6 month and is noted to have mild hypocalcemia. Ca 8.3  Rec to continue Ca suppls 600 mg BID while on Xgeva  Will repeat CMP today.. BP high frequently, WNL today. Rec to see cardioonc. BS better now MRI 10/2022, 2023 stable. Fb Dr Lundberg. MRI 2023 showed POD, s/p SRS 2023, repeat MRI 2024 SD CT 2023 SD , 2024 SD She is on xgeva q6 m as she has been on it for > 4 yrs. Last dental exam 2021. Last Xgeva  23, 24. Next due 2024 ECHO Q3M 23 LVEF 55% to 60%, 10/2023, ECHo due 2024 She reports family issues, her  has worsening Parkinson's disease and 2 falls. son had hernia surgery  24: Patient is here today for Kanjinti/Perjecta- Q3 weeks.  Patient denies any SOB or C/N/V/D. She reports eating well, with no changes in appetite and no weight change.  Patient is on XGEVA, Q 6 months. Next dose due: 24. Patient denies any dental issues.  Patient reports significant pitting edema in her bilateral extremities x 2 months. Denies that it improves with elevation and can be painful. Office to r/o DVT. Bilateral Venous Duplex ordered, ASAP. Referral to Nephrology and Cardiology (appt: ) also placed. Ms. Cardozo reports she discussed this concern with her PCP at the last visit who instructed her to take 2 Hydrochlorothiazide pills daily to see if s/s resolved. Patient reports s/s have remained consistent. Creatinine has progressively increased over the last year.  Ms. Cardozo underwent her last ECHO on 24, next imaging: OVERDUE. Orders placed for ASAP Imaging. Results showin. Left ventricular cavity is normal. Left ventricular wall thickness is mildly increased. Left ventricular systolic function is normal with an ejection fraction of 67 % by Braden's method of disks. Last CT C/A/P: 14; Stable disease Ms. Cardozo reports her son has successfully healed from his hernia s/x . Unfortunately, her  continue to decline and will be entering a Rehabilitation Facility in [place]. Patient also request a letter to be written on behalf of her sister-in-law coming from [place] to be a caregiver to her spouse as she is unable to care for him given her treatment schedule and general overall health.  Treatment held 2/2 overdue ECHO and new s/s of b/l LE swelling (2+pitting) RTC:  2 months with treatment; patient to obtain ECHO and Cardiology clearance prior to next infusion. CT before next visit   2025 Patient is here today for Kanjinti/Perjecta- Q3 weeks. Will repeat imaging studies in 4-6 months. Last CT C/A/P completed 2025; SD noted. Plan to continue HP & Anastrozole until POD. Will order imaging at follow-up visit in May 2025.  Patient denies any Cardiopulmonary s/s or C/N/V/D. She reports she is eating well, with no changes in appetite and no weight change. Patient reported her pain today was a 10/10 concentrating in her back and knees. Discussed adding in breakthrough medication to supplement OTC Tylenol with Oxycodone, 5mg every 6 hours. Patient was relieved and wished for prescription to be sent to [facility] on file. 2 mg IV Morphine given in treatment room. Pain relieved with x 1 dose to 2/10.  Ms. Cardozo is UTD with Cardiology. Patient saw Dr. Holman last on 2025. Patient has longstanding DM2 & HTN, as well as venous insufficiency, diastolic dysfunction (noted on ECHO) with normal PRO-BNP's. LE edema continues to be persistent (+2 on PE today), with some improvement with trial of loop diuretic per patient. Patient was encouraged to follow-up with Vascular so she can be evaluated/measured for compression garments. BP remains slightly elevated, but improved with medication adjustments. Patient is currently taking Losartan 100 mg daily, HCTZ 25 mg daily with 25 mg of Spironolactone PRN, and Carvedilol 3.125 BID. Last ECHO was completed on 2025 with a LVEF of 60%. Repeat imaging Q3 months; imaging ordered/managed by Cardiology.  Patient established care with Dr. Suárez (Nephrology) for CKD. At initial consult, it was thought that the LE edema was unlikely related to her kidney disease. Patient was encouraged to discontinue use of NSAIDS and follow-up in 4-6 months. Since initial evaluation, Creatinine up trending to 1.76 on 2025. Encouraged patient to follow-up with Nephrology at this time. Patient is UTD with Dr. Lundberg; last seen on 2025. Patient denies any neurologic s/s to include: nausea, vomiting, diplopia, blurry vision, unsteady gait or headaches. MR Head last completed 2025; stable disease with no new lesions identified. Patient to undergo repeat testing in 3 months and follow-up accordingly with Dr. Lundberg. RTC: with each infusion; Q3 weeks   2025 Patient had imaging done on 2024 . Upon review of CT films, my interpretation is that patient has STABLE disease. I reviewed these findings with the patient Patient is here today for Kanjinti/Perjecta- Q3 weeks.  Patient denies any SOB or C/N/V/D. She reports eating well, with no changes in appetite and no weight change.  Patient is on XGEVA, Q 6 months.  Patient denies any dental issues.  Next  Last ECHO: Ordered Patient established care with Dr. Holman on 2024. BP high, rec to f/u with him  Patient established care with Dr. Suárez (Nephrology) for CKD. Cr stable  Otherwise feeling well.  F/b Dr Lundberg for brain MRI and brain metastases   2025: Patient is here today for Kanjinti/Perjecta- Q3 weeks. Will repeat imaging studies in 4-6 months. Last CT C/A/P completed 2025; SD noted. Plan to continue HP & Anastrozole until POD. PET/CT ordered today secondary to kidney disease and up trending creatinine values with considerable side effects associated: +3 pitting edema, elevated BP, and 10/10 LE pain.  Patient denies any SOB, s/s or C/N/V/D. She reports she is eating well, with no changes in appetite and no weight change. Patient reported her pain today was a 10/10 concentrating in her knees. Discussed referral to Palliative Care for tighter pain control, but declines today. Refill of 5 mg Oxycodone sent to [facility] on file. 1 mg IV Hydromorphone given in treatment room. Discussed ability to complete ADL's and take care of her ailing spouse. The patient reports she is experiencing some challenges and is working to bring her sister back from her home country.  Patient UTD with PCP; last seen by Dr. Willis on 13 May 2025. Lab work and Bilateral Venous Duplexes ordered. Imaging completed on 23 May 2025; no acute findings. Referral to Vascular Surgery sent; scheduled for 3 Dina 2025. Ms. Cardozo is UTD with Cardiology. Patient saw Dr. Holman last on 2025. Patient has longstanding DM2 & HTN, as well as venous insufficiency, diastolic dysfunction (noted on ECHO) with normal PRO-BNP's. LE edema continues to be persistent (+3 on PE today), with some improvement with trial of loop diuretic per patient.  BP remains slightly elevated, but improved with medication adjustments. Patient is currently taking Losartan 100 mg daily, HCTZ 25 mg daily with 25 mg of Spironolactone PRN, and Carvedilol 3.125 BID. Last ECHO was completed on 2025 with a LVEF of 60%. Repeat imaging Q3 months; imaging ordered/managed by Cardiology.  Patient is UTD with Dr. Lundberg; last seen on 2025. Patient denies any neurologic s/s to include: nausea, vomiting, diplopia, blurry vision, unsteady gait or headaches. MR Head last completed 2025; stable disease with no new lesions identified. Patient to undergo repeat testing in 3 months and follow-up accordingly with Dr. Lundberg; scheduled for 2025. RTC: with each infusion; Q3 weeks  2025: Patient is here today for Kanjinti/Perjeta- Q3 weeks. Will repeat imaging studies in 4-6 months. Last CT C/A/P completed 2025; SD noted. Plan to continue HP & Anastrozole until POD.  Patient denies any SOB, s/s or C/N/V/D. She reports she is eating well, with no changes in appetite and no weight change. Patient reported her pain today was a 9/10 concentrating in her bilateral knees (Left > Right). Again, discussed referral to Palliative Care for tighter pain control, but declines today. 1 mg IV Hydromorphone given in treatment room. Discussed ability to complete ADL's and take care of her ailing spouse. Patient requesting letter in support of her sister traveling from [place] to assist with care of both her and her  who has end-stage Parkinson's Disease. Letter provided today to patient in hopes her sister will be able to come to the [place] for another 90 days.  PET/CT ordered at last visit secondary to kidney disease and up trending creatinine values with considerable side effects associated: +3 pitting edema, elevated BP, and 10/10 LE pain. Patient was unable to schedule. Office assisted patient in scheduling for:  at [facility].  Patient is UTD with Dr. Lundberg; last seen on 2025. Patient denies any neurologic s/s to include: nausea, vomiting, diplopia, blurry vision, unsteady gait or headaches. MR Head last completed 30 MAY 2025; redemonstrated multifocal enhancing lesions compatible with patient's history of metastatic disease. Lesions were grossly similar in size and associated edema. Stable encephalomalacia and gliosis in the right medial temporal lobe. No new lesions were noted. Ms. Cardozo is UTD with Cardiology. Patient saw Dr. Holman last on 2025. Patient has longstanding DM2 & HTN, as well as venous insufficiency, diastolic dysfunction (noted on ECHO) with normal PRO-BNP's. LE edema continues to be persistent (+3 on PE today), with some improvement with trial of loop diuretic per patient.  BP remains slightly elevated, but improved with medication adjustments. Patient is currently taking Losartan 100 mg daily, HCTZ 25 mg daily with 25 mg of Spironolactone PRN, and Carvedilol 3.125 BID. Last ECHO was completed on 2025 with a LVEF of 60%. Repeat imaging Q3 months; imaging ordered today. Patient next scheduled with Dr. Whitehead on 2025. Encouraged patient to self-schedule. Patient UTD with PCP; last seen by Dr. Willis on 13 May 2025. Lab work and Bilateral Venous Duplexes ordered. Imaging completed on 23 May 2025; no acute findings. Referral to Vascular Surgery sent; scheduled for 2025. RTC: with each infusion; Q3 weeks [FreeTextEntry1] : Started THP on 7/10/17, on HP now\par  Anastrazole 1/2018\par  XTH 6/2021- 8/2021\par  HP 8/2021 [de-identified] : Ms. MONSERRAT CARDOZO is here for follow up visit for metastatic breast cancer. She completed THP 7/2017, HP and Anastrazole. Progression of disease in the brain, started Xeloda Herceptin and tucatinib 6/2021. MRI 8/2021 showed response but pt could not tolerate XHT regimen despite dose reduction. HP restarted 8/2021.   7/9/25: Patient is here today for Kanjinti/Perjeta- Q3 weeks. Last PET/CT completed on 30 June 2025; SD noted. Plan to continue HP & Anastrozole until POD. Will repeat imaging studies in 4-6 months.  Patient denies any SOB, s/s or C/N/V/D. She reports she is eating well, with no changes in appetite and no weight change. Patient is UTD with Dr. Lundberg; last seen on 04 JUNE 2025. Patient denies any neurologic s/s to include: nausea, vomiting, diplopia, blurry vision, unsteady gait or headaches. MR Head last completed 30 MAY 2025; redemonstrated multifocal enhancing lesions compatible with patient's history of metastatic disease. Lesions were grossly similar in size and associated edema. Stable encephalomalacia and gliosis in the right medial temporal lobe. No new lesions were noted. Patient scheduled to be seen on 10 September 2025. Ms. Cardozo is UTD with Cardiology. Patient saw Dr. Holman last on 14 April 2025. Patient has longstanding DM2 & HTN, as well as venous insufficiency, diastolic dysfunction (noted on ECHO) with normal PRO-BNP's. LE edema continues to be persistent (+3 on PE today), with some improvement with trial of loop diuretic per patient.  BP remains slightly elevated, but improved with medication adjustments. Patient is currently taking Losartan 100 mg daily, HCTZ 25 mg daily with 25 mg of Spironolactone PRN, and Carvedilol 3.125 BID. Last ECHO was completed on 25 March 2025 with a LVEF of 60%. Repeat imaging Q3 months; imaging ordered today. Patient next scheduled with Dr. Whitehead on 14 JULY 2025. Encouraged patient to self-schedule. Patient established care with Vascular Surgery, Dr. De La Cruz on June 24, 2025. The patient reports he was told she is a candidate to endovenous ablation treatment and is contemplating scheduling the procedure.  Of note, the patient's sister was approved for another 90 days to travel from [place] to assist her family. Ms. Cardozo plans to file for an extension of another 90 days once she arrives on June 27, 2025.  The patient request a letter for her pension plan today stating she is currently being treated by our office. Letter provided to patient same day.  SHE IS ON XGEVA Q6M. DUE JULY 2025, WILL ADD TO NEXT TREATMENT. NEXT DOSE JAN 2026 RTC: every 3 weeks with treatment

## 2025-07-09 NOTE — ASSESSMENT
[Palliative] : Goals of care discussed with patient: Palliative [FreeTextEntry1] : This is a 75-year-old very pleasant  lady, with medical history significant for diabetes, dyslipidemia, hypertension and hypothyroidism, who is diagnosed with metastatic stage IV ER positive, NH negative, HER-2/douglas positive breast cancer. She presented with symptomatic brain metastasis and completed gamma knife radiosurgery to the brain metastases. She also has bone metastases, lymph node metastasis and peritoneal metastasis. She started THP on 7/10/17. Excellent response to chemo clinically, inflammatory changes resolved, CT scans after 4 cycles showed excellent response except one new sclerotic focus on L3 which was radiated. PET 3/2018 showed good response, Brain MRI shows new small lesion. She is on Arimidex + Herceptin, Perjeta. Progression of disease in the brain, started Xeloda Herceptin and tucatinib 6/2021.  METASTATIC BREAST CANCER WITH BRAIN METASTASES: Progression of disease in the brain, started Xeloda Herceptin and Tucatinib 6/2021. MRI 8/2021 showed response but pt could not tolerate XHT regimen despite dose reduction. HP restarted 8/2021. Patient is tolerating Anastrozole, Herceptin & Perjeta.  7/9/2025 Patient is here today for Kanjinti/Perjeta- Q3 weeks. Last PET/CT completed on 30 June 2025; SD noted. Plan to continue HP & Anastrozole until POD. Will repeat imaging studies in 4-6 months.  Patient denies any SOB, s/s or C/N/V/D. She reports she is eating well, with no changes in appetite and no weight change.  Patient established care with Vascular Surgery, Dr. De La Cruz on June 24, 2025. The patient reports he was told she is a candidate to endovenous ablation treatment and is contemplating scheduling the procedure.   Of note, the patient's sister was approved for another 90 days to travel from [place] to assist her family. Ms. Cardozo plans to file for an extension of another 90 days once she arrives on June 27, 2025.   The patient requests a letter for her pension plan today stating she is currently being treated by our office. Letter provided to patient same day.   BRAIN METASTASES: MRI 9/2023 POD- s/p SRS MRI 12/2024 SD MRI 3/2024 SD MRI 5/2025 SD  Patient is UTD with Dr. Lundberg; last seen on 04 JUNE 2025. Patient denies any neurologic s/s to include: nausea, vomiting, diplopia, blurry vision, unsteady gait or headaches. MR Head last completed 30 MAY 2025; redemonstrated multifocal enhancing lesions compatible with patient's history of metastatic disease. Lesions were grossly similar in size and associated edema. Stable encephalomalacia and gliosis in the right medial temporal lobe. No new lesions were noted. Patient scheduled to be seen on 10 September 2025.  HTN & RISK FOR CARDIOTOXICITY: Ms. Cardozo is UTD with Cardiology. Patient saw Dr. Holman last on 14 April 2025. Patient has longstanding DM2 & HTN, as well as venous insufficiency, diastolic dysfunction (noted on ECHO) with normal PRO-BNP's. LE edema continues to be persistent (+3 on PE today), with some improvement with trial of loop diuretic per patient.  BP remains slightly elevated, but improved with medication adjustments. Patient is currently taking Losartan 100 mg daily, HCTZ 25 mg daily with 25 mg of Spironolactone PRN, and Carvedilol 3.125 BID. Last ECHO was completed on 25 March 2025 with a LVEF of 60%. Repeat imaging Q3 months; imaging ordered today. Patient next scheduled with Dr. Whitehead on 14 JULY 2025. Encouraged patient to self-schedule.  BONE METASTASES: She is on XGEVA Q6M. She reports she saw dental 4/2021 and exam was good. Last XGEVA given: 1/2025; next due: 7/30/2025 SHE IS ON XGEVA Q6M. DUE JULY 2025, WILL ADD TO NEXT TREATMENT. NEXT DOSE JAN 2026  PERITONEAL METASTASES: No ascites on PE. Will continue to monitor.  DIABETES: Patient established care with Dr. Suárez (Nephrology) for CKD. At initial consult, it was thought that the LE edema was unlikely related to her kidney disease. Patient was encouraged to discontinue use of NSAIDS and follow-up in 4-6 months. Since initial evaluation, Creatinine up trending to 1.76 on 14 April 2025. Encourage patient at each visit to follow-up with Nephrology at this time.  - Elevated tumor markers: stable with minor fluctuations noted. Trend q 2 months  Continue Kancoty & Hema Q3 weeks; appointments made through August 2025 XGEVA Q6 months; next due July 30, 2025  RTC 3 weeks

## 2025-07-09 NOTE — HISTORY OF PRESENT ILLNESS
[Patient Refusal] : Patient refused psychosocial distress assessment [ECOG Performance Status: 1 - Restricted in physically strenuous activity but ambulatory and able to carry out work of a light or sedentary nature] : Performance Status: 1 - Restricted in physically strenuous activity but ambulatory and able to carry out work of a light or sedentary nature, e.g., light house work, office work [IV] : IV [de-identified] : Ms. Cardozo is a 74-year-old lady who was recently diagnosed with HER-2 positive breast cancer with brain metastasis. She presented today for  an evaluation and to start chemotherapy. Her oncologic history is as follows:  She presented to  [facility] emergency room on 17 with complaints of aphasia for 2 weeks.  A CT head showed new lesion with mixed attenuation in the left temporofrontal region. MRI had done on 17 showed multiple enhancing parenchymal lesion at the gray-white junction with surrounding vasogenic edema suspicious for metastatic disease. No leptomeningeal enhancement was noted. She also underwent a CT scan of chest abdomen and pelvis on the same day which showed right axillary and pectoral lymphadenopathy, irregular mass in the right breast, 1.5 cm celiac axis lymph node and an omental implant  suspicious for peritoneal carcinomatosis. She was started on Keppra by neurology.  She underwent ultrasound-guided biopsy of the right axillary lymphadenopathy on 5/3/17which showed adenocarcinoma of mammary origin, positive for breast markers. ER 95%, UT negative and HER-2/douglas 3+  She completed gamma knife radiation treatment for multiple brain metastases on 17 by Dr. Savage. She denies headaches, balance issues, seizures, change in vision, hearing difficulty, memory problems, localized motor weakness  or comprehension problems.  She went for a second opinion at [facility] and they made the same recommendation (THP)  She was seen in 2019 with c/o worsening cough, wheezing and HOLLAND. Chest CT didn't show pna or POD. She is using inhalers now and is feeling much better. Cough and wheezing resolved.  CT 2020- ? liver lesion, MRI recommended. 2/15/2020 MRI ABD Impression: No focal liver lesions are visualized   Patient complaint of worsening generalized body pain 2021.  CT scan 2020 showed stable disease.  She was sent for a PET/CT to evaluate bony disease. PET/CT 2021 images reviewed: She has mild increase in FDG activity in the thoracolumbar spine.  MRI 3/2021 showed arthritis, spinal stenosis and stable bone mets.  Since there is no clear evidence of progression, we will continue with Herceptin and Perjeta.  She will continue anastrozole.  She reports overall body pain has improved without intervention.  She was also given 2 weeks anastrozole break which did not help. Pain has since resolved. She doesn't take pain meds daily. Tylenol once or twice a week. Discussed ortho f/u and PT prn   2021 She has been doing very well with the current regimen.  She underwent a brain MRI 2021 which showed progression of CNS mets and possibility of leptomeningeal disease.  Patient is asymptomatic.  I sent her for CT chest abdomen pelvis and discussed results with her today.  CT scans essentially do not show any evidence of disease progression outside of CNS. Dr. Lundberg discussed the case with me and concern is that she has significant burden of CNS disease which is currently not being controlled with localized radiation treatments.  She received gamma knife to multiple areas in May 2017 and 2018.  Current progression is in some of the treated mets but some of them are new as well.  I discussed MRI brain and CT chest abdomen pelvis findings with the patient as well as her son on the phone.  I recommend to switch therapy to cover CNS disease as well. We reviewed that she has been treated with standard of care first-line regimen based on HUBERT study.  We reviewed results from HER 2 CLIMB study which showed benefit of adding tucatinib to Herceptin and Xeloda.  Study showed improvement in overall survival as well as progression free survival especially in patients with brain metastases.  Side effect of the regimen reviewed with the patient and son.  Patient has multiple medical comorbidities and is as such taking multiple oral medications.  She was very concerned about adding multiple pills to her daily regimen.  Her son agreed to maintain a pillbox and supervise the pill intake.    She will take:  Capecitabine 1500 mg twice daily 1 week on 1 week off. Tucatinib 300 mg twice a day is the recommended dose but given the concern about significant GI toxicity, I recommended she start with dose reduction.  She will take 1 pill twice daily.  She will also take Imodium 4 times a day and report if she has more than 3-4 bowel movements a day.  Use of antinausea, antidiarrheal and prophylactic use of udder cream to prevent hand-foot syndrome was reviewed with the patient as well. She will continue Herceptin every 3 weeks.  She will stop Perjeta and anastrozole.  Addendum: Pharmacy called me on 2021 and reported that patient did not want to start the regimen.  I presented the case in tumor board on 2021 and consensus opinion was to recommend change in treatment and to start XTH to treat CNS disease.  I discussed that with the patient and explained her to start.  We reviewed side effect profile again.  She will start with the lower dose and see me in 1 week.  Depending on tolerability, we will uptitrate to full dose.  She started treatment 6/15/2021. She was extremely concerned about side effects and multiple pills therefore she is started on a lower dose.  21: she reports no significant toxicity. She had mild fatigue but no nausea vomiting diarrhea mouth sores or hand-foot syndrome. She was taking Imodium as prescribed and reports mild constipation. This is her off week for Xeloda. I recommend to increase tucatinib dose 2 pills twice daily(full dose). I will see her next week. If she has no concerning side effects, we will increase Xeloda dose as well. Last week she took Xeloda 2 and 2 pills (1000 bid) due to concerns about toxicity. She is due for Herceptin next week. We will do blood work next week and titrate up the dose as tolerated.   21: She iS here with her  today. Getting Herceptin today. She is on full dose of tucatinib 2 pills twice daily. Reports that constipation is resolved. She had 3 episodes of semisolid bowel movements, improved with Imodium. She denies nausea or vomiting. No other concerning symptoms. I recommend to start Xeloda and increase the dose to 3 pills twice daily 1 week on 1 week off. She will call if any concerning symptoms specifically diarrhea. Change in dose was discussed with the patient and . Written instructions were provided as well. Appointment for 3 weeks Herceptin and echocardiogram was discussed. Addendum: Blood work from 2021 showed rising creatinine to 1.93. She has diabetes and hypertension. Patient did not report severe diarrhea but this rising creatinine is concerning. I recommend that she comes for IV fluid this week and we will repeat CMP. She will hold Xeloda in the meantime. hold and Iv luids.   21 Saw patient in tx room today she is here for IV fluids #3 this as she has ongoing loose stools and decreased appetite x 1 week. On 7/3/21 she was also noted to have hypocalcemia and elevated creatinine. Xeloda was stopped on 21 and patient was instructed to decrease Tucatinib to 1 pill BID. Since decreasing Tucatinib she has about 4 diarrhea episodes daily. Instructed to continue Imodium prn. She reports  cough productive of scant amount of yellow mucous x 4 days no fever mild SOB when going up the stairs but this is not new. No SOB with regular activity.    2021 Today she reports that she is compliant with pills.  She reports severe fatigue,  unable to do household chores, lower extremity swelling, loose watery diarrhea 2-3 times a day, maximum 4 times, taking Imodium 2-3 times a day.  She has nausea even with water, unable to eat much, poor taste.  She is taking antinausea without much relief.  GERD is severe since PPI was held (interaction with Xeloda), not much relieved with Pepcid. Patient was crying today and said she cannot do it anymore.  I recommend to stop Xeloda and tucatinib for now.  She can restart PPI.  She will be treated with Herceptin today.  We will draw routine labs.  Stat BMP showed that creatinine is stable. 3-month brain MRI follow-up is coming up soon.  We will reevaluate after brain MRI.  Options include either switching to Herceptin Perjeta and radiating the brain lesions or consider Enhertu as it does have some CNS penetration. We will hold off CT imaging for now due to slight bump in creatinine.  Echo ordered today   5/3/2023  Patient seen in tx room today s/p syncopal episode/fall 2022 2/2 hypoglycemia. She reports feeling dizzy prior to episode. She was seen in ER CT head 4/10/23: stable No evidence of acute cortical infarction or hemorrhage. No dizziness headache or other neuro symptoms presently. D/w Dr. Noel RICO to proceed with Kanjinti/perjeta today. Reports knee pain and will f/u PCP 23 ECHO 4/10/23 LVEF 61% No CP or SOB Patient had imaging done on 3/2023. Upon review of CT films, my interpretation is that patient has STABLE disease. I reviewed these findings with the patient   2024 Here for Kanjinti perjeta today No cp sob, no GI sx with HP  She is on Xgeva q 6 month and is noted to have mild hypocalcemia. Ca 8.3  Rec to continue Ca suppls 600 mg BID while on Xgeva  Will repeat CMP today.. BP high frequently, WNL today. Rec to see cardioonc. BS better now MRI 10/2022, 2023 stable. Fb Dr Lundberg. MRI 2023 showed POD, s/p SRS 2023, repeat MRI 2024 SD CT 2023 SD , 2024 SD She is on xgeva q6 m as she has been on it for > 4 yrs. Last dental exam 2021. Last Xgeva  23, 24. Next due 2024 ECHO Q3M 23 LVEF 55% to 60%, 10/2023, ECHo due 2024 She reports family issues, her  has worsening Parkinson's disease and 2 falls. son had hernia surgery  24: Patient is here today for Kanjinti/Perjecta- Q3 weeks.  Patient denies any SOB or C/N/V/D. She reports eating well, with no changes in appetite and no weight change.  Patient is on XGEVA, Q 6 months. Next dose due: 24. Patient denies any dental issues.  Patient reports significant pitting edema in her bilateral extremities x 2 months. Denies that it improves with elevation and can be painful. Office to r/o DVT. Bilateral Venous Duplex ordered, ASAP. Referral to Nephrology and Cardiology (appt: ) also placed. Ms. Cardozo reports she discussed this concern with her PCP at the last visit who instructed her to take 2 Hydrochlorothiazide pills daily to see if s/s resolved. Patient reports s/s have remained consistent. Creatinine has progressively increased over the last year.  Ms. Cardozo underwent her last ECHO on 24, next imaging: OVERDUE. Orders placed for ASAP Imaging. Results showin. Left ventricular cavity is normal. Left ventricular wall thickness is mildly increased. Left ventricular systolic function is normal with an ejection fraction of 67 % by Braden's method of disks. Last CT C/A/P: 14; Stable disease Ms. Cardozo reports her son has successfully healed from his hernia s/x . Unfortunately, her  continue to decline and will be entering a Rehabilitation Facility in [place]. Patient also request a letter to be written on behalf of her sister-in-law coming from [place] to be a caregiver to her spouse as she is unable to care for him given her treatment schedule and general overall health.  Treatment held 2/2 overdue ECHO and new s/s of b/l LE swelling (2+pitting) RTC:  2 months with treatment; patient to obtain ECHO and Cardiology clearance prior to next infusion. CT before next visit   2025 Patient is here today for Kanjinti/Perjecta- Q3 weeks. Will repeat imaging studies in 4-6 months. Last CT C/A/P completed 2025; SD noted. Plan to continue HP & Anastrozole until POD. Will order imaging at follow-up visit in May 2025.  Patient denies any Cardiopulmonary s/s or C/N/V/D. She reports she is eating well, with no changes in appetite and no weight change. Patient reported her pain today was a 10/10 concentrating in her back and knees. Discussed adding in breakthrough medication to supplement OTC Tylenol with Oxycodone, 5mg every 6 hours. Patient was relieved and wished for prescription to be sent to [facility] on file. 2 mg IV Morphine given in treatment room. Pain relieved with x 1 dose to 2/10.  Ms. Cardozo is UTD with Cardiology. Patient saw Dr. Holman last on 2025. Patient has longstanding DM2 & HTN, as well as venous insufficiency, diastolic dysfunction (noted on ECHO) with normal PRO-BNP's. LE edema continues to be persistent (+2 on PE today), with some improvement with trial of loop diuretic per patient. Patient was encouraged to follow-up with Vascular so she can be evaluated/measured for compression garments. BP remains slightly elevated, but improved with medication adjustments. Patient is currently taking Losartan 100 mg daily, HCTZ 25 mg daily with 25 mg of Spironolactone PRN, and Carvedilol 3.125 BID. Last ECHO was completed on 2025 with a LVEF of 60%. Repeat imaging Q3 months; imaging ordered/managed by Cardiology.  Patient established care with Dr. Suárez (Nephrology) for CKD. At initial consult, it was thought that the LE edema was unlikely related to her kidney disease. Patient was encouraged to discontinue use of NSAIDS and follow-up in 4-6 months. Since initial evaluation, Creatinine up trending to 1.76 on 2025. Encouraged patient to follow-up with Nephrology at this time. Patient is UTD with Dr. Lundberg; last seen on 2025. Patient denies any neurologic s/s to include: nausea, vomiting, diplopia, blurry vision, unsteady gait or headaches. MR Head last completed 2025; stable disease with no new lesions identified. Patient to undergo repeat testing in 3 months and follow-up accordingly with Dr. Lundberg. RTC: with each infusion; Q3 weeks   2025 Patient had imaging done on 2024 . Upon review of CT films, my interpretation is that patient has STABLE disease. I reviewed these findings with the patient Patient is here today for Kanjinti/Perjecta- Q3 weeks.  Patient denies any SOB or C/N/V/D. She reports eating well, with no changes in appetite and no weight change.  Patient is on XGEVA, Q 6 months.  Patient denies any dental issues.  Next  Last ECHO: Ordered Patient established care with Dr. Holman on 2024. BP high, rec to f/u with him  Patient established care with Dr. Suárez (Nephrology) for CKD. Cr stable  Otherwise feeling well.  F/b Dr Lundberg for brain MRI and brain metastases   2025: Patient is here today for Kanjinti/Perjecta- Q3 weeks. Will repeat imaging studies in 4-6 months. Last CT C/A/P completed 2025; SD noted. Plan to continue HP & Anastrozole until POD. PET/CT ordered today secondary to kidney disease and up trending creatinine values with considerable side effects associated: +3 pitting edema, elevated BP, and 10/10 LE pain.  Patient denies any SOB, s/s or C/N/V/D. She reports she is eating well, with no changes in appetite and no weight change. Patient reported her pain today was a 10/10 concentrating in her knees. Discussed referral to Palliative Care for tighter pain control, but declines today. Refill of 5 mg Oxycodone sent to [facility] on file. 1 mg IV Hydromorphone given in treatment room. Discussed ability to complete ADL's and take care of her ailing spouse. The patient reports she is experiencing some challenges and is working to bring her sister back from her home country.  Patient UTD with PCP; last seen by Dr. Willis on 13 May 2025. Lab work and Bilateral Venous Duplexes ordered. Imaging completed on 23 May 2025; no acute findings. Referral to Vascular Surgery sent; scheduled for 3 Dina 2025. Ms. Cardozo is UTD with Cardiology. Patient saw Dr. Holman last on 2025. Patient has longstanding DM2 & HTN, as well as venous insufficiency, diastolic dysfunction (noted on ECHO) with normal PRO-BNP's. LE edema continues to be persistent (+3 on PE today), with some improvement with trial of loop diuretic per patient.  BP remains slightly elevated, but improved with medication adjustments. Patient is currently taking Losartan 100 mg daily, HCTZ 25 mg daily with 25 mg of Spironolactone PRN, and Carvedilol 3.125 BID. Last ECHO was completed on 2025 with a LVEF of 60%. Repeat imaging Q3 months; imaging ordered/managed by Cardiology.  Patient is UTD with Dr. Lundberg; last seen on 2025. Patient denies any neurologic s/s to include: nausea, vomiting, diplopia, blurry vision, unsteady gait or headaches. MR Head last completed 2025; stable disease with no new lesions identified. Patient to undergo repeat testing in 3 months and follow-up accordingly with Dr. Lundberg; scheduled for 2025. RTC: with each infusion; Q3 weeks  2025: Patient is here today for Kanjinti/Perjeta- Q3 weeks. Will repeat imaging studies in 4-6 months. Last CT C/A/P completed 2025; SD noted. Plan to continue HP & Anastrozole until POD.  Patient denies any SOB, s/s or C/N/V/D. She reports she is eating well, with no changes in appetite and no weight change. Patient reported her pain today was a 9/10 concentrating in her bilateral knees (Left > Right). Again, discussed referral to Palliative Care for tighter pain control, but declines today. 1 mg IV Hydromorphone given in treatment room. Discussed ability to complete ADL's and take care of her ailing spouse. Patient requesting letter in support of her sister traveling from [place] to assist with care of both her and her  who has end-stage Parkinson's Disease. Letter provided today to patient in hopes her sister will be able to come to the [place] for another 90 days.  PET/CT ordered at last visit secondary to kidney disease and up trending creatinine values with considerable side effects associated: +3 pitting edema, elevated BP, and 10/10 LE pain. Patient was unable to schedule. Office assisted patient in scheduling for:  at [facility].  Patient is UTD with Dr. Lundberg; last seen on 2025. Patient denies any neurologic s/s to include: nausea, vomiting, diplopia, blurry vision, unsteady gait or headaches. MR Head last completed 30 MAY 2025; redemonstrated multifocal enhancing lesions compatible with patient's history of metastatic disease. Lesions were grossly similar in size and associated edema. Stable encephalomalacia and gliosis in the right medial temporal lobe. No new lesions were noted. Ms. Cardozo is UTD with Cardiology. Patient saw Dr. Holman last on 2025. Patient has longstanding DM2 & HTN, as well as venous insufficiency, diastolic dysfunction (noted on ECHO) with normal PRO-BNP's. LE edema continues to be persistent (+3 on PE today), with some improvement with trial of loop diuretic per patient.  BP remains slightly elevated, but improved with medication adjustments. Patient is currently taking Losartan 100 mg daily, HCTZ 25 mg daily with 25 mg of Spironolactone PRN, and Carvedilol 3.125 BID. Last ECHO was completed on 2025 with a LVEF of 60%. Repeat imaging Q3 months; imaging ordered today. Patient next scheduled with Dr. Whitehead on 2025. Encouraged patient to self-schedule. Patient UTD with PCP; last seen by Dr. Willis on 13 May 2025. Lab work and Bilateral Venous Duplexes ordered. Imaging completed on 23 May 2025; no acute findings. Referral to Vascular Surgery sent; scheduled for 2025. RTC: with each infusion; Q3 weeks [FreeTextEntry1] : Started THP on 7/10/17, on HP now\par  Anastrazole 1/2018\par  XTH 6/2021- 8/2021\par  HP 8/2021 [de-identified] : Ms. MONSERRAT CARDOZO is here for follow up visit for metastatic breast cancer. She completed THP 7/2017, HP and Anastrazole. Progression of disease in the brain, started Xeloda Herceptin and tucatinib 6/2021. MRI 8/2021 showed response but pt could not tolerate XHT regimen despite dose reduction. HP restarted 8/2021.   7/9/25: Patient is here today for Kanjinti/Perjeta- Q3 weeks. Last PET/CT completed on 30 June 2025; SD noted. Plan to continue HP & Anastrozole until POD. Will repeat imaging studies in 4-6 months.  Patient denies any SOB, s/s or C/N/V/D. She reports she is eating well, with no changes in appetite and no weight change. Patient is UTD with Dr. Lundberg; last seen on 04 JUNE 2025. Patient denies any neurologic s/s to include: nausea, vomiting, diplopia, blurry vision, unsteady gait or headaches. MR Head last completed 30 MAY 2025; redemonstrated multifocal enhancing lesions compatible with patient's history of metastatic disease. Lesions were grossly similar in size and associated edema. Stable encephalomalacia and gliosis in the right medial temporal lobe. No new lesions were noted. Patient scheduled to be seen on 10 September 2025. Ms. Cardozo is UTD with Cardiology. Patient saw Dr. Holman last on 14 April 2025. Patient has longstanding DM2 & HTN, as well as venous insufficiency, diastolic dysfunction (noted on ECHO) with normal PRO-BNP's. LE edema continues to be persistent (+3 on PE today), with some improvement with trial of loop diuretic per patient.  BP remains slightly elevated, but improved with medication adjustments. Patient is currently taking Losartan 100 mg daily, HCTZ 25 mg daily with 25 mg of Spironolactone PRN, and Carvedilol 3.125 BID. Last ECHO was completed on 25 March 2025 with a LVEF of 60%. Repeat imaging Q3 months; imaging ordered today. Patient next scheduled with Dr. Whitheead on 14 JULY 2025. Encouraged patient to self-schedule. Patient established care with Vascular Surgery, Dr. De La Cruz on June 24, 2025. The patient reports he was told she is a candidate to endovenous ablation treatment and is contemplating scheduling the procedure.  Of note, the patient's sister was approved for another 90 days to travel from [place] to assist her family. Ms. Cardozo plans to file for an extension of another 90 days once she arrives on June 27, 2025.  The patient request a letter for her pension plan today stating she is currently being treated by our office. Letter provided to patient same day.  SHE IS ON XGEVA Q6M. DUE JULY 2025, WILL ADD TO NEXT TREATMENT. NEXT DOSE JAN 2026 RTC: every 3 weeks with treatment

## 2025-07-09 NOTE — PHYSICAL EXAM
[Restricted in physically strenuous activity but ambulatory and able to carry out work of a light or sedentary nature] : Status 1- Restricted in physically strenuous activity but ambulatory and able to carry out work of a light or sedentary nature, e.g., light house work, office work [Obese] : obese [Normal] : affect appropriate [de-identified] : port in left chest wall c/d/i , chronic ankle/pedal swelling noted  rt. >left.. Chronic stable B/L LE edema  [de-identified] : Right breast- right breast slightly bigger than left breast + skin thickening in the outer lower quadrant with vague fullness. cellulitis resolved. No LN palpable UNCHANGED [de-identified] : bilateral pitting edema+2

## 2025-07-09 NOTE — PHYSICAL EXAM
[Restricted in physically strenuous activity but ambulatory and able to carry out work of a light or sedentary nature] : Status 1- Restricted in physically strenuous activity but ambulatory and able to carry out work of a light or sedentary nature, e.g., light house work, office work [Obese] : obese [Normal] : affect appropriate [de-identified] : port in left chest wall c/d/i , chronic ankle/pedal swelling noted  rt. >left.. Chronic stable B/L LE edema  [de-identified] : Right breast- right breast slightly bigger than left breast + skin thickening in the outer lower quadrant with vague fullness. cellulitis resolved. No LN palpable UNCHANGED [de-identified] : bilateral pitting edema+2

## 2025-07-09 NOTE — ASSESSMENT
[Palliative] : Goals of care discussed with patient: Palliative [FreeTextEntry1] : This is a 75-year-old very pleasant  lady, with medical history significant for diabetes, dyslipidemia, hypertension and hypothyroidism, who is diagnosed with metastatic stage IV ER positive, OR negative, HER-2/douglas positive breast cancer. She presented with symptomatic brain metastasis and completed gamma knife radiosurgery to the brain metastases. She also has bone metastases, lymph node metastasis and peritoneal metastasis. She started THP on 7/10/17. Excellent response to chemo clinically, inflammatory changes resolved, CT scans after 4 cycles showed excellent response except one new sclerotic focus on L3 which was radiated. PET 3/2018 showed good response, Brain MRI shows new small lesion. She is on Arimidex + Herceptin, Perjeta. Progression of disease in the brain, started Xeloda Herceptin and tucatinib 6/2021.  METASTATIC BREAST CANCER WITH BRAIN METASTASES: Progression of disease in the brain, started Xeloda Herceptin and Tucatinib 6/2021. MRI 8/2021 showed response but pt could not tolerate XHT regimen despite dose reduction. HP restarted 8/2021. Patient is tolerating Anastrozole, Herceptin & Perjeta.  7/9/2025 Patient is here today for Kanjinti/Perjeta- Q3 weeks. Last PET/CT completed on 30 June 2025; SD noted. Plan to continue HP & Anastrozole until POD. Will repeat imaging studies in 4-6 months.  Patient denies any SOB, s/s or C/N/V/D. She reports she is eating well, with no changes in appetite and no weight change.  Patient established care with Vascular Surgery, Dr. De La Cruz on June 24, 2025. The patient reports he was told she is a candidate to endovenous ablation treatment and is contemplating scheduling the procedure.   Of note, the patient's sister was approved for another 90 days to travel from [place] to assist her family. Ms. Cardozo plans to file for an extension of another 90 days once she arrives on June 27, 2025.   The patient requests a letter for her pension plan today stating she is currently being treated by our office. Letter provided to patient same day.   BRAIN METASTASES: MRI 9/2023 POD- s/p SRS MRI 12/2024 SD MRI 3/2024 SD MRI 5/2025 SD  Patient is UTD with Dr. Lundberg; last seen on 04 JUNE 2025. Patient denies any neurologic s/s to include: nausea, vomiting, diplopia, blurry vision, unsteady gait or headaches. MR Head last completed 30 MAY 2025; redemonstrated multifocal enhancing lesions compatible with patient's history of metastatic disease. Lesions were grossly similar in size and associated edema. Stable encephalomalacia and gliosis in the right medial temporal lobe. No new lesions were noted. Patient scheduled to be seen on 10 September 2025.  HTN & RISK FOR CARDIOTOXICITY: Ms. Cardozo is UTD with Cardiology. Patient saw Dr. Holman last on 14 April 2025. Patient has longstanding DM2 & HTN, as well as venous insufficiency, diastolic dysfunction (noted on ECHO) with normal PRO-BNP's. LE edema continues to be persistent (+3 on PE today), with some improvement with trial of loop diuretic per patient.  BP remains slightly elevated, but improved with medication adjustments. Patient is currently taking Losartan 100 mg daily, HCTZ 25 mg daily with 25 mg of Spironolactone PRN, and Carvedilol 3.125 BID. Last ECHO was completed on 25 March 2025 with a LVEF of 60%. Repeat imaging Q3 months; imaging ordered today. Patient next scheduled with Dr. Whitehead on 14 JULY 2025. Encouraged patient to self-schedule.  BONE METASTASES: She is on XGEVA Q6M. She reports she saw dental 4/2021 and exam was good. Last XGEVA given: 1/2025; next due: 7/30/2025 SHE IS ON XGEVA Q6M. DUE JULY 2025, WILL ADD TO NEXT TREATMENT. NEXT DOSE JAN 2026  PERITONEAL METASTASES: No ascites on PE. Will continue to monitor.  DIABETES: Patient established care with Dr. Suárez (Nephrology) for CKD. At initial consult, it was thought that the LE edema was unlikely related to her kidney disease. Patient was encouraged to discontinue use of NSAIDS and follow-up in 4-6 months. Since initial evaluation, Creatinine up trending to 1.76 on 14 April 2025. Encourage patient at each visit to follow-up with Nephrology at this time.  - Elevated tumor markers: stable with minor fluctuations noted. Trend q 2 months  Continue Kancoty & Hema Q3 weeks; appointments made through August 2025 XGEVA Q6 months; next due July 30, 2025  RTC 3 weeks

## 2025-07-14 NOTE — HISTORY OF PRESENT ILLNESS
[FreeTextEntry1] : Interval History: She was able to start Farxiga  and notes some improvement overall. Her blood pressure fluctuates, but is better controlled after medication adjustment. She was evaluated by Vascular Surgery, given a diagnosis of venous insufficiency, and a GSV ablation was recommended for her symptoms. She remains on Kajinti + pertuzumab and the latest echocardiogram showed normal LV function and no changes from prior.   History: The patient reports chronic bilateral leg swelling that has been ongoing for a long time and does not subside. Previously, the swelling would reduce by morning but now persists throughout the day. The leg swelling causes discomfort and heaviness.  She has no history of DVT. A duplex ultrasound examination of the lower extremity veins showed no evidence of DVT.   She has been treated with Kajinti and pertuzumab since  for metastatic breast cancer. The patient has been undergoing regular echocardiograms every three months during HER2 targeted therapy, with the most recent one in 2024 showing no significant abnormalities. Given increased edema and no recent echocardiogram, treatment was held pending cardiology evaluation. She reports not having chest pain, shortness of breath, or palpitations.  The patient also experiences elevated blood pressure readings during clinic visits, with readings sometimes reaching 180/187 mmHg. The patient reports taking medication for blood pressure, but fluctuations persist.   Past Medical History: - Hypertension - Diabetes mellitus type 2 - an SGLT-2 inhibitor was prescribed previously but copay cost was too high - Metastatic breast cancer (since ) - Hysterectomy - Knee repair  Social History: - Lives with son and  near [facility] - Retired, previously worked in cleaning - Non-smoker, no ETOH  2025: She continues to be affected by lower extremity edema. She was evaluated by Nephrology and referred to Vascular Medicine who recommended compression and prescribed a trial of furosemide. She is not using compression as she found it too tight. But states she will follow up. She was advised to stop HCTZ and start furosemide 20 mg daily in September. She reports taking two of the furosemide tablets helped the swelling a little. Echocardiogram from November showed normal LVEF and grade 2 diastolic dysfunction. Today, her blood pressure is elevated. She states she is out of her medications and requests refills.  2025: BP better after medication adjustment. Jardiance not covered by her insurance. Lower extremity edema persists. She has not followed up with the vascular yet. She continues to experience pain all over her body, using tylenol for this. Follow up echo performed in 2025 - normal LVEF and stable findings (grade 2 diastolic dysfunction). Last CMP 3/5/2025 after starting manuel/HCTZ: Cr 1.44 and K 5.3  No longer taking furosemide    Cardiovascular Summary: ---------------------------------------------- EC2025: NSR 64 bpm w sinus arrhythmia, LVH w repolarization abnormality 2025: sinus bradycardia 53 w PACs, LVH with repolarization abnormality 2025: sinus 65 bpm w sinus arrhythmia, LVH w repolarization abnormality 2024: sinus bradycardia with PACs, LVH with repolarization abnormality 2024: sinus bradycardia 54 bpm, LVH ---------------------------------------------- Echo: 2025: EF 62 %, mild LVH, GLS -19.5, mod LAE 3/25/2025: EF 61 %, GLS -17, grade 2 diastolic dysfunction, mod LAE, PASP 39, trace pericardial effusion  2024: EF 65 %, grade 2 diastolic dysfunction, LAE, GLS -20 (GE), concentric LVH, mild AS 2024: EF 68 %, GLS -17,2 (GE) 2024: EF 67 %, GLS -18.4, mod LAE, mild PHTN 23 LVEF 55% to 60% 4/10/2023 LVEF 61%  ---------------------------------------------- Stress: ---------------------------------------------- CT/MRI ---------------------------------------------- Remote/ambulatory rhythm monitoring:

## 2025-07-14 NOTE — PHYSICAL EXAM
[Well Developed] : well developed [Well Nourished] : well nourished [No Acute Distress] : no acute distress [Normal Venous Pressure] : normal venous pressure [Normal S1, S2] : normal S1, S2 [No Murmur] : no murmur [No Gallop] : no gallop [Clear Lung Fields] : clear lung fields [Good Air Entry] : good air entry [No Respiratory Distress] : no respiratory distress  [Venous stasis] : venous stasis [Normal Speech] : normal speech [Alert and Oriented] : alert and oriented [de-identified] : degenerative joint deformity of metatarsals  [de-identified] : trace bilateral lower extremity edema, faint livedo pattern

## 2025-07-14 NOTE — ASSESSMENT
[FreeTextEntry1] : =============================================== 75 year old woman with metastatic HER2+ breast cancer diagnosed in 2017, longstanding type 2 diabetes and hypertension, venous insufficiency, diastolic dysfunction by echo, but pro-BNPs normal.  # Lower extremity edema: weight stable. Chest clear. Suspect due to diabetic nephropathy and venous insufficiency rather than heart failure, but the patient did note some improvement with trial of loop diuretic. - advised to follow up with Vascular for compression - spironolactone 25 mg daily + HCTZ 25 mg daily for HFpEF and HTN - furosemide PRN - Farxiga 10 mg daily for diastolic dysfunction, CKD, and uncontrolled type 2 diabetes  # Hypertension with labile readings at clinic visits: improved after med adjustment - continue losartan 100 mg daily - continue HCTZ 25 mg daily (with spironolactone 25 mg daily added) - changed metoprolol to carvedilol 3.125 BID - continue this dose for now given bradycardia - continue BP monitoring  # History of metastatic breast cancer with ongoing targeted therapy - continue surveillance echo with GLS during HER2 targeted therapy. - reasonable to space out to every 6 months - last echo, June 2025 - normal LVEF  # Type 2 diabetes, A1c 7.5 % April 2025 - cont Farxiga 10 - she stopped metformin due to GI side effects (even w extended release) - on glipizide and pioglitazone - follow up with PCP  Follow up in 6 months with me  Above recommendations were discussed with the patient and all questions answered to the best of my ability and to her apparent satisfaction.

## 2025-07-14 NOTE — REVIEW OF SYSTEMS
[Lower Ext Edema] : lower extremity edema [SOB] : no shortness of breath [FreeTextEntry2] : weight stable [FreeTextEntry8] : No urinary symptoms reported

## 2025-07-14 NOTE — REASON FOR VISIT
[FreeTextEntry3] : Dr. Cohen [FreeTextEntry1] : ====================================================== MONSERRAT KENT is a 75 year old woman with a history of ER+/HER2 positive breast cancer, hypertension, type 2 diabetes, seen for follow up of lower extremity edema and elevated blood pressure.  Prior Cancer Treatments: ------------------------------------------------------------------------ Chemo/targeted therapy: 7/10/2017: THP --> HP 1/2018: anastrozole 6/2021-8/2021: tucatinib/capecitabine 8/2021-present: Sesar + pertuzumab ------------------------------------------------------------------------ Surgery: ------------------------------------------------------------------------ Radiation: 5/30/2017-2022: gamma knife for multiple brain metastases

## 2025-07-30 NOTE — PHYSICAL EXAM
[Restricted in physically strenuous activity but ambulatory and able to carry out work of a light or sedentary nature] : Status 1- Restricted in physically strenuous activity but ambulatory and able to carry out work of a light or sedentary nature, e.g., light house work, office work [Obese] : obese [Normal] : affect appropriate [de-identified] : port in left chest wall c/d/i , chronic ankle/pedal swelling noted  rt. >left.. Chronic stable B/L LE edema  [de-identified] : Right breast- right breast slightly bigger than left breast + skin thickening in the outer lower quadrant with vague fullness. cellulitis resolved. No LN palpable UNCHANGED [de-identified] : bilateral pitting edema+2

## 2025-07-30 NOTE — REASON FOR VISIT
[Follow-Up Visit] : a follow-up [Family Member] : family member [FreeTextEntry2] : ER +, HER 2+ breast cancer with brain mets

## 2025-07-30 NOTE — HISTORY OF PRESENT ILLNESS
[Patient Refusal] : Patient refused psychosocial distress assessment [ECOG Performance Status: 1 - Restricted in physically strenuous activity but ambulatory and able to carry out work of a light or sedentary nature] : Performance Status: 1 - Restricted in physically strenuous activity but ambulatory and able to carry out work of a light or sedentary nature, e.g., light house work, office work [IV] : IV [de-identified] : Ms. Cardozo is a 74-year-old lady who was recently diagnosed with HER-2 positive breast cancer with brain metastasis. She presented today for  an evaluation and to start chemotherapy. Her oncologic history is as follows:  She presented to  [facility] emergency room on 17 with complaints of aphasia for 2 weeks.  A CT head showed new lesion with mixed attenuation in the left temporofrontal region. MRI had done on 17 showed multiple enhancing parenchymal lesion at the gray-white junction with surrounding vasogenic edema suspicious for metastatic disease. No leptomeningeal enhancement was noted. She also underwent a CT scan of chest abdomen and pelvis on the same day which showed right axillary and pectoral lymphadenopathy, irregular mass in the right breast, 1.5 cm celiac axis lymph node and an omental implant  suspicious for peritoneal carcinomatosis. She was started on Keppra by neurology.  She underwent ultrasound-guided biopsy of the right axillary lymphadenopathy on 5/3/17which showed adenocarcinoma of mammary origin, positive for breast markers. ER 95%, NV negative and HER-2/douglas 3+  She completed gamma knife radiation treatment for multiple brain metastases on 17 by Dr. Savage. She denies headaches, balance issues, seizures, change in vision, hearing difficulty, memory problems, localized motor weakness  or comprehension problems.  She went for a second opinion at [facility] and they made the same recommendation (THP)  She was seen in 2019 with c/o worsening cough, wheezing and HOLLAND. Chest CT didn't show pna or POD. She is using inhalers now and is feeling much better. Cough and wheezing resolved.  CT 2020- ? liver lesion, MRI recommended. 2/15/2020 MRI ABD Impression: No focal liver lesions are visualized   Patient complaint of worsening generalized body pain 2021.  CT scan 2020 showed stable disease.  She was sent for a PET/CT to evaluate bony disease. PET/CT 2021 images reviewed: She has mild increase in FDG activity in the thoracolumbar spine.  MRI 3/2021 showed arthritis, spinal stenosis and stable bone mets.  Since there is no clear evidence of progression, we will continue with Herceptin and Perjeta.  She will continue anastrozole.  She reports overall body pain has improved without intervention.  She was also given 2 weeks anastrozole break which did not help. Pain has since resolved. She doesn't take pain meds daily. Tylenol once or twice a week. Discussed ortho f/u and PT prn   2021 She has been doing very well with the current regimen.  She underwent a brain MRI 2021 which showed progression of CNS mets and possibility of leptomeningeal disease.  Patient is asymptomatic.  I sent her for CT chest abdomen pelvis and discussed results with her today.  CT scans essentially do not show any evidence of disease progression outside of CNS. Dr. Lundberg discussed the case with me and concern is that she has significant burden of CNS disease which is currently not being controlled with localized radiation treatments.  She received gamma knife to multiple areas in May 2017 and 2018.  Current progression is in some of the treated mets but some of them are new as well.  I discussed MRI brain and CT chest abdomen pelvis findings with the patient as well as her son on the phone.  I recommend to switch therapy to cover CNS disease as well. We reviewed that she has been treated with standard of care first-line regimen based on HUBERT study.  We reviewed results from HER 2 CLIMB study which showed benefit of adding tucatinib to Herceptin and Xeloda.  Study showed improvement in overall survival as well as progression free survival especially in patients with brain metastases.  Side effect of the regimen reviewed with the patient and son.  Patient has multiple medical comorbidities and is as such taking multiple oral medications.  She was very concerned about adding multiple pills to her daily regimen.  Her son agreed to maintain a pillbox and supervise the pill intake.    She will take:  Capecitabine 1500 mg twice daily 1 week on 1 week off. Tucatinib 300 mg twice a day is the recommended dose but given the concern about significant GI toxicity, I recommended she start with dose reduction.  She will take 1 pill twice daily.  She will also take Imodium 4 times a day and report if she has more than 3-4 bowel movements a day.  Use of antinausea, antidiarrheal and prophylactic use of udder cream to prevent hand-foot syndrome was reviewed with the patient as well. She will continue Herceptin every 3 weeks.  She will stop Perjeta and anastrozole.  Addendum: Pharmacy called me on 2021 and reported that patient did not want to start the regimen.  I presented the case in tumor board on 2021 and consensus opinion was to recommend change in treatment and to start XTH to treat CNS disease.  I discussed that with the patient and explained her to start.  We reviewed side effect profile again.  She will start with the lower dose and see me in 1 week.  Depending on tolerability, we will uptitrate to full dose.  She started treatment 6/15/2021. She was extremely concerned about side effects and multiple pills therefore she is started on a lower dose.  21: she reports no significant toxicity. She had mild fatigue but no nausea vomiting diarrhea mouth sores or hand-foot syndrome. She was taking Imodium as prescribed and reports mild constipation. This is her off week for Xeloda. I recommend to increase tucatinib dose 2 pills twice daily(full dose). I will see her next week. If she has no concerning side effects, we will increase Xeloda dose as well. Last week she took Xeloda 2 and 2 pills (1000 bid) due to concerns about toxicity. She is due for Herceptin next week. We will do blood work next week and titrate up the dose as tolerated.   21: She iS here with her  today. Getting Herceptin today. She is on full dose of tucatinib 2 pills twice daily. Reports that constipation is resolved. She had 3 episodes of semisolid bowel movements, improved with Imodium. She denies nausea or vomiting. No other concerning symptoms. I recommend to start Xeloda and increase the dose to 3 pills twice daily 1 week on 1 week off. She will call if any concerning symptoms specifically diarrhea. Change in dose was discussed with the patient and . Written instructions were provided as well. Appointment for 3 weeks Herceptin and echocardiogram was discussed. Addendum: Blood work from 2021 showed rising creatinine to 1.93. She has diabetes and hypertension. Patient did not report severe diarrhea but this rising creatinine is concerning. I recommend that she comes for IV fluid this week and we will repeat CMP. She will hold Xeloda in the meantime. hold and Iv luids.   21 Saw patient in tx room today she is here for IV fluids #3 this as she has ongoing loose stools and decreased appetite x 1 week. On 7/3/21 she was also noted to have hypocalcemia and elevated creatinine. Xeloda was stopped on 21 and patient was instructed to decrease Tucatinib to 1 pill BID. Since decreasing Tucatinib she has about 4 diarrhea episodes daily. Instructed to continue Imodium prn. She reports  cough productive of scant amount of yellow mucous x 4 days no fever mild SOB when going up the stairs but this is not new. No SOB with regular activity.    2021 Today she reports that she is compliant with pills.  She reports severe fatigue,  unable to do household chores, lower extremity swelling, loose watery diarrhea 2-3 times a day, maximum 4 times, taking Imodium 2-3 times a day.  She has nausea even with water, unable to eat much, poor taste.  She is taking antinausea without much relief.  GERD is severe since PPI was held (interaction with Xeloda), not much relieved with Pepcid. Patient was crying today and said she cannot do it anymore.  I recommend to stop Xeloda and tucatinib for now.  She can restart PPI.  She will be treated with Herceptin today.  We will draw routine labs.  Stat BMP showed that creatinine is stable. 3-month brain MRI follow-up is coming up soon.  We will reevaluate after brain MRI.  Options include either switching to Herceptin Perjeta and radiating the brain lesions or consider Enhertu as it does have some CNS penetration. We will hold off CT imaging for now due to slight bump in creatinine.  Echo ordered today   5/3/2023  Patient seen in tx room today s/p syncopal episode/fall 2022 2/2 hypoglycemia. She reports feeling dizzy prior to episode. She was seen in ER CT head 4/10/23: stable No evidence of acute cortical infarction or hemorrhage. No dizziness headache or other neuro symptoms presently. D/w Dr. Noel RICO to proceed with Kanjinti/perjeta today. Reports knee pain and will f/u PCP 23 ECHO 4/10/23 LVEF 61% No CP or SOB Patient had imaging done on 3/2023. Upon review of CT films, my interpretation is that patient has STABLE disease. I reviewed these findings with the patient   2024 Here for Kanjinti perjeta today No cp sob, no GI sx with HP  She is on Xgeva q 6 month and is noted to have mild hypocalcemia. Ca 8.3  Rec to continue Ca suppls 600 mg BID while on Xgeva  Will repeat CMP today.. BP high frequently, WNL today. Rec to see cardioonc. BS better now MRI 10/2022, 2023 stable. Fb Dr Lundberg. MRI 2023 showed POD, s/p SRS 2023, repeat MRI 2024 SD CT 2023 SD , 2024 SD She is on xgeva q6 m as she has been on it for > 4 yrs. Last dental exam 2021. Last Xgeva  23, 24. Next due 2024 ECHO Q3M 23 LVEF 55% to 60%, 10/2023, ECHo due 2024 She reports family issues, her  has worsening Parkinson's disease and 2 falls. son had hernia surgery  24: Patient is here today for Kanjinti/Perjecta- Q3 weeks.  Patient denies any SOB or C/N/V/D. She reports eating well, with no changes in appetite and no weight change.  Patient is on XGEVA, Q 6 months. Next dose due: 24. Patient denies any dental issues.  Patient reports significant pitting edema in her bilateral extremities x 2 months. Denies that it improves with elevation and can be painful. Office to r/o DVT. Bilateral Venous Duplex ordered, ASAP. Referral to Nephrology and Cardiology (appt: ) also placed. Ms. Cardozo reports she discussed this concern with her PCP at the last visit who instructed her to take 2 Hydrochlorothiazide pills daily to see if s/s resolved. Patient reports s/s have remained consistent. Creatinine has progressively increased over the last year.  Ms. Cardozo underwent her last ECHO on 24, next imaging: OVERDUE. Orders placed for ASAP Imaging. Results showin. Left ventricular cavity is normal. Left ventricular wall thickness is mildly increased. Left ventricular systolic function is normal with an ejection fraction of 67 % by Braden's method of disks. Last CT C/A/P: 14; Stable disease Ms. Cardozo reports her son has successfully healed from his hernia s/x . Unfortunately, her  continue to decline and will be entering a Rehabilitation Facility in [place]. Patient also request a letter to be written on behalf of her sister-in-law coming from [place] to be a caregiver to her spouse as she is unable to care for him given her treatment schedule and general overall health.  Treatment held 2/2 overdue ECHO and new s/s of b/l LE swelling (2+pitting) RTC:  2 months with treatment; patient to obtain ECHO and Cardiology clearance prior to next infusion. CT before next visit   2025 Patient is here today for Kanjinti/Perjecta- Q3 weeks. Will repeat imaging studies in 4-6 months. Last CT C/A/P completed 2025; SD noted. Plan to continue HP & Anastrozole until POD. Will order imaging at follow-up visit in May 2025.  Patient denies any Cardiopulmonary s/s or C/N/V/D. She reports she is eating well, with no changes in appetite and no weight change. Patient reported her pain today was a 10/10 concentrating in her back and knees. Discussed adding in breakthrough medication to supplement OTC Tylenol with Oxycodone, 5mg every 6 hours. Patient was relieved and wished for prescription to be sent to [facility] on file. 2 mg IV Morphine given in treatment room. Pain relieved with x 1 dose to 2/10.  Ms. Cardozo is UTD with Cardiology. Patient saw Dr. Holman last on 2025. Patient has longstanding DM2 & HTN, as well as venous insufficiency, diastolic dysfunction (noted on ECHO) with normal PRO-BNP's. LE edema continues to be persistent (+2 on PE today), with some improvement with trial of loop diuretic per patient. Patient was encouraged to follow-up with Vascular so she can be evaluated/measured for compression garments. BP remains slightly elevated, but improved with medication adjustments. Patient is currently taking Losartan 100 mg daily, HCTZ 25 mg daily with 25 mg of Spironolactone PRN, and Carvedilol 3.125 BID. Last ECHO was completed on 2025 with a LVEF of 60%. Repeat imaging Q3 months; imaging ordered/managed by Cardiology.  Patient established care with Dr. Suárez (Nephrology) for CKD. At initial consult, it was thought that the LE edema was unlikely related to her kidney disease. Patient was encouraged to discontinue use of NSAIDS and follow-up in 4-6 months. Since initial evaluation, Creatinine up trending to 1.76 on 2025. Encouraged patient to follow-up with Nephrology at this time. Patient is UTD with Dr. Lundberg; last seen on 2025. Patient denies any neurologic s/s to include: nausea, vomiting, diplopia, blurry vision, unsteady gait or headaches. MR Head last completed 2025; stable disease with no new lesions identified. Patient to undergo repeat testing in 3 months and follow-up accordingly with Dr. Lundberg. RTC: with each infusion; Q3 weeks   2025 Patient had imaging done on 2024 . Upon review of CT films, my interpretation is that patient has STABLE disease. I reviewed these findings with the patient Patient is here today for Kanjinti/Perjecta- Q3 weeks.  Patient denies any SOB or C/N/V/D. She reports eating well, with no changes in appetite and no weight change.  Patient is on XGEVA, Q 6 months.  Patient denies any dental issues.  Next  Last ECHO: Ordered Patient established care with Dr. Holman on 2024. BP high, rec to f/u with him  Patient established care with Dr. Suárez (Nephrology) for CKD. Cr stable  Otherwise feeling well.  F/b Dr Lundberg for brain MRI and brain metastases   2025: Patient is here today for Kanjinti/Perjecta- Q3 weeks. Will repeat imaging studies in 4-6 months. Last CT C/A/P completed 2025; SD noted. Plan to continue HP & Anastrozole until POD. PET/CT ordered today secondary to kidney disease and up trending creatinine values with considerable side effects associated: +3 pitting edema, elevated BP, and 10/10 LE pain.  Patient denies any SOB, s/s or C/N/V/D. She reports she is eating well, with no changes in appetite and no weight change. Patient reported her pain today was a 10/10 concentrating in her knees. Discussed referral to Palliative Care for tighter pain control, but declines today. Refill of 5 mg Oxycodone sent to [facility] on file. 1 mg IV Hydromorphone given in treatment room. Discussed ability to complete ADL's and take care of her ailing spouse. The patient reports she is experiencing some challenges and is working to bring her sister back from her home country.  Patient UTD with PCP; last seen by Dr. Willis on 13 May 2025. Lab work and Bilateral Venous Duplexes ordered. Imaging completed on 23 May 2025; no acute findings. Referral to Vascular Surgery sent; scheduled for 3 Dina 2025. Ms. Cardozo is UTD with Cardiology. Patient saw Dr. Holman last on 2025. Patient has longstanding DM2 & HTN, as well as venous insufficiency, diastolic dysfunction (noted on ECHO) with normal PRO-BNP's. LE edema continues to be persistent (+3 on PE today), with some improvement with trial of loop diuretic per patient.  BP remains slightly elevated, but improved with medication adjustments. Patient is currently taking Losartan 100 mg daily, HCTZ 25 mg daily with 25 mg of Spironolactone PRN, and Carvedilol 3.125 BID. Last ECHO was completed on 2025 with a LVEF of 60%. Repeat imaging Q3 months; imaging ordered/managed by Cardiology.  Patient is UTD with Dr. Lundberg; last seen on 2025. Patient denies any neurologic s/s to include: nausea, vomiting, diplopia, blurry vision, unsteady gait or headaches. MR Head last completed 2025; stable disease with no new lesions identified. Patient to undergo repeat testing in 3 months and follow-up accordingly with Dr. Lundberg; scheduled for 2025. RTC: with each infusion; Q3 weeks  2025: Patient is here today for Kanjinti/Perjeta- Q3 weeks. Will repeat imaging studies in 4-6 months. Last CT C/A/P completed 2025; SD noted. Plan to continue HP & Anastrozole until POD.  Patient denies any SOB, s/s or C/N/V/D. She reports she is eating well, with no changes in appetite and no weight change. Patient reported her pain today was a 9/10 concentrating in her bilateral knees (Left > Right). Again, discussed referral to Palliative Care for tighter pain control, but declines today. 1 mg IV Hydromorphone given in treatment room. Discussed ability to complete ADL's and take care of her ailing spouse. Patient requesting letter in support of her sister traveling from [place] to assist with care of both her and her  who has end-stage Parkinson's Disease. Letter provided today to patient in hopes her sister will be able to come to the [place] for another 90 days.  PET/CT ordered at last visit secondary to kidney disease and up trending creatinine values with considerable side effects associated: +3 pitting edema, elevated BP, and 10/10 LE pain. Patient was unable to schedule. Office assisted patient in scheduling for:  at [facility].  Patient is UTD with Dr. Lundberg; last seen on 2025. Patient denies any neurologic s/s to include: nausea, vomiting, diplopia, blurry vision, unsteady gait or headaches. MR Head last completed 30 MAY 2025; redemonstrated multifocal enhancing lesions compatible with patient's history of metastatic disease. Lesions were grossly similar in size and associated edema. Stable encephalomalacia and gliosis in the right medial temporal lobe. No new lesions were noted. Ms. Cardozo is UTD with Cardiology. Patient saw Dr. Holman last on 2025. Patient has longstanding DM2 & HTN, as well as venous insufficiency, diastolic dysfunction (noted on ECHO) with normal PRO-BNP's. LE edema continues to be persistent (+3 on PE today), with some improvement with trial of loop diuretic per patient.  BP remains slightly elevated, but improved with medication adjustments. Patient is currently taking Losartan 100 mg daily, HCTZ 25 mg daily with 25 mg of Spironolactone PRN, and Carvedilol 3.125 BID. Last ECHO was completed on 2025 with a LVEF of 60%. Repeat imaging Q3 months; imaging ordered today. Patient next scheduled with Dr. Whitehead on 2025. Encouraged patient to self-schedule. Patient UTD with PCP; last seen by Dr. Willis on 13 May 2025. Lab work and Bilateral Venous Duplexes ordered. Imaging completed on 23 May 2025; no acute findings. Referral to Vascular Surgery sent; scheduled for 2025. RTC: with each infusion; Q3 weeks  25: Patient is here today for Kanjinti/Perjeta- Q3 weeks. Last PET/CT completed on 2025; SD noted. Plan to continue HP & Anastrozole until POD. Will repeat imaging studies in 4-6 months.  Patient denies any SOB, s/s or C/N/V/D. She reports she is eating well, with no changes in appetite and no weight change. Patient is UTD with Dr. Lundberg; last seen on 2025. Patient denies any neurologic s/s to include: nausea, vomiting, diplopia, blurry vision, unsteady gait or headaches. MR Head last completed 30 MAY 2025; redemonstrated multifocal enhancing lesions compatible with patient's history of metastatic disease. Lesions were grossly similar in size and associated edema. Stable encephalomalacia and gliosis in the right medial temporal lobe. No new lesions were noted. Patient scheduled to be seen on 10 September 2025. Ms. Cardozo is UTD with Cardiology. Patient saw Dr. Holman last on 2025. Patient has longstanding DM2 & HTN, as well as venous insufficiency, diastolic dysfunction (noted on ECHO) with normal PRO-BNP's. LE edema continues to be persistent (+3 on PE today), with some improvement with trial of loop diuretic per patient.  BP remains slightly elevated, but improved with medication adjustments. Patient is currently taking Losartan 100 mg daily, HCTZ 25 mg daily with 25 mg of Spironolactone PRN, and Carvedilol 3.125 BID. Last ECHO was completed on 2025 with a LVEF of 60%. Repeat imaging Q3 months; imaging ordered today. Patient next scheduled with Dr. Whitehead on 2025. Encouraged patient to self-schedule. Patient established care with Vascular Surgery, Dr. De La Cruz on 2025. The patient reports he was told she is a candidate to endovenous ablation treatment and is contemplating scheduling the procedure.  Of note, the patient's sister was approved for another 90 days to travel from [place] to assist her family. Ms. Cardozo plans to file for an extension of another 90 days once she arrives on 2025.  The patient request a letter for her pension plan today stating she is currently being treated by our office. Letter provided to patient same day.  SHE IS ON XGEVA Q6M. DUE 2025, WILL ADD TO NEXT TREATMENT. NEXT DOSE 2026 RTC: every 3 weeks with treatment [FreeTextEntry1] : Started THP on 7/10/17, on HP now\par  Anastrazole 1/2018\par  XTH 6/2021- 8/2021\par  HP 8/2021 [de-identified] : Ms. MONSERRAT CARDOZO is here for follow up visit for metastatic breast cancer. She completed THP 7/2017, HP and Anastrazole. Progression of disease in the brain, started Xeloda Herceptin and tucatinib 6/2021. MRI 8/2021 showed response but pt could not tolerate XHT regimen despite dose reduction. HP restarted 8/2021.  7/30/2025: Patient is here today for Kanjinti/Perjeta- Q3 weeks. Last PET/CT completed on 30 June 2025; SD noted. Plan to continue HP & Anastrozole until POD. Will repeat imaging studies in 4-6 months.  Patient denies any SOB, s/s or C/N/V/D. She reports she is eating well, with no changes in appetite and no weight change. Refilled anastrozole and omeprazole today.  Patient is UTD with Dr. Lundberg; last seen on 04 JUNE 2025. Patient denies any neurologic s/s to include: nausea, vomiting, diplopia, blurry vision, unsteady gait or headaches. MR Head last completed 30 MAY 2025; redemonstrated multifocal enhancing lesions compatible with patient's history of metastatic disease. Lesions were grossly similar in size and associated edema. Stable encephalomalacia and gliosis in the right medial temporal lobe. No new lesions were noted. Patient scheduled to be seen on 10 September 2025. Ms. Cardozo is UTD with Cardiology. Patient saw Dr. Holman last on 14 July 2025. Patient has longstanding DM2 & HTN, as well as venous insufficiency, diastolic dysfunction (noted on ECHO) with normal PRO-BNP's. LE edema continues to be persistent (+3 on PE today), with some improvement.  BP  today is improved. Patient is currently taking Losartan 100 mg daily, HCTZ 25 mg daily with 25 mg of Spironolactone PRN, and Carvedilol 3.125 BID. Farxiga added for diastolic dysfunction, CKD and uncontrolled DM2.  Last ECHO was completed on 23 June 2025 with a LVEF of 62%. Repeat imaging Q3 months; next due: SEPTEMBER 2025.  Patient next scheduled with Dr. Whitehead on 12 JANUARY 2026. Bone Health: Patient is current on XGEVA Q6 months. Last dose: TODAY, next dose due: January/February 2026.  RTC: every 3 weeks with treatment

## 2025-07-30 NOTE — HISTORY OF PRESENT ILLNESS
[Patient Refusal] : Patient refused psychosocial distress assessment [ECOG Performance Status: 1 - Restricted in physically strenuous activity but ambulatory and able to carry out work of a light or sedentary nature] : Performance Status: 1 - Restricted in physically strenuous activity but ambulatory and able to carry out work of a light or sedentary nature, e.g., light house work, office work [IV] : IV [de-identified] : Ms. Cardozo is a 74-year-old lady who was recently diagnosed with HER-2 positive breast cancer with brain metastasis. She presented today for  an evaluation and to start chemotherapy. Her oncologic history is as follows:  She presented to  [facility] emergency room on 17 with complaints of aphasia for 2 weeks.  A CT head showed new lesion with mixed attenuation in the left temporofrontal region. MRI had done on 17 showed multiple enhancing parenchymal lesion at the gray-white junction with surrounding vasogenic edema suspicious for metastatic disease. No leptomeningeal enhancement was noted. She also underwent a CT scan of chest abdomen and pelvis on the same day which showed right axillary and pectoral lymphadenopathy, irregular mass in the right breast, 1.5 cm celiac axis lymph node and an omental implant  suspicious for peritoneal carcinomatosis. She was started on Keppra by neurology.  She underwent ultrasound-guided biopsy of the right axillary lymphadenopathy on 5/3/17which showed adenocarcinoma of mammary origin, positive for breast markers. ER 95%, TN negative and HER-2/douglas 3+  She completed gamma knife radiation treatment for multiple brain metastases on 17 by Dr. Savage. She denies headaches, balance issues, seizures, change in vision, hearing difficulty, memory problems, localized motor weakness  or comprehension problems.  She went for a second opinion at [facility] [place] and they made the same recommendation (THP)  She was seen in 2019 with c/o worsening cough, wheezing and HOLLAND. Chest CT didn't show pna or POD. She is using inhalers now and is feeling much better. Cough and wheezing resolved.  CT 2020- ? liver lesion, MRI recommended. 2/15/2020 MRI ABD Impression: No focal liver lesions are visualized   Patient complaint of worsening generalized body pain 2021.  CT scan 2020 showed stable disease.  She was sent for a PET/CT to evaluate bony disease. PET/CT 2021 images reviewed: She has mild increase in FDG activity in the thoracolumbar spine.  MRI 3/2021 showed arthritis, spinal stenosis and stable bone mets.  Since there is no clear evidence of progression, we will continue with Herceptin and Perjeta.  She will continue anastrozole.  She reports overall body pain has improved without intervention.  She was also given 2 weeks anastrozole break which did not help. Pain has since resolved. She doesn't take pain meds daily. Tylenol once or twice a week. Discussed ortho f/u and PT prn   2021 She has been doing very well with the current regimen.  She underwent a brain MRI 2021 which showed progression of CNS mets and possibility of leptomeningeal disease.  Patient is asymptomatic.  I sent her for CT chest abdomen pelvis and discussed results with her today.  CT scans essentially do not show any evidence of disease progression outside of CNS. Dr. Lundberg discussed the case with me and concern is that she has significant burden of CNS disease which is currently not being controlled with localized radiation treatments.  She received gamma knife to multiple areas in May 2017 and 2018.  Current progression is in some of the treated mets but some of them are new as well.  I discussed MRI brain and CT chest abdomen pelvis findings with the patient as well as her son on the phone.  I recommend to switch therapy to cover CNS disease as well. We reviewed that she has been treated with standard of care first-line regimen based on HUBERT study.  We reviewed results from HER 2 CLIMB study which showed benefit of adding tucatinib to Herceptin and Xeloda.  Study showed improvement in overall survival as well as progression free survival especially in patients with brain metastases.  Side effect of the regimen reviewed with the patient and son.  Patient has multiple medical comorbidities and is as such taking multiple oral medications.  She was very concerned about adding multiple pills to her daily regimen.  Her son agreed to maintain a pillbox and supervise the pill intake.    She will take:  Capecitabine 1500 mg twice daily 1 week on 1 week off. Tucatinib 300 mg twice a day is the recommended dose but given the concern about significant GI toxicity, I recommended she start with dose reduction.  She will take 1 pill twice daily.  She will also take Imodium 4 times a day and report if she has more than 3-4 bowel movements a day.  Use of antinausea, antidiarrheal and prophylactic use of udder cream to prevent hand-foot syndrome was reviewed with the patient as well. She will continue Herceptin every 3 weeks.  She will stop Perjeta and anastrozole.  Addendum: Pharmacy called me on 2021 and reported that patient did not want to start the regimen.  I presented the case in tumor board on 2021 and consensus opinion was to recommend change in treatment and to start XTH to treat CNS disease.  I discussed that with the patient and explained her to start.  We reviewed side effect profile again.  She will start with the lower dose and see me in 1 week.  Depending on tolerability, we will uptitrate to full dose.  She started treatment 6/15/2021. She was extremely concerned about side effects and multiple pills therefore she is started on a lower dose.  21: she reports no significant toxicity. She had mild fatigue but no nausea vomiting diarrhea mouth sores or hand-foot syndrome. She was taking Imodium as prescribed and reports mild constipation. This is her off week for Xeloda. I recommend to increase tucatinib dose 2 pills twice daily(full dose). I will see her next week. If she has no concerning side effects, we will increase Xeloda dose as well. Last week she took Xeloda 2 and 2 pills (1000 bid) due to concerns about toxicity. She is due for Herceptin next week. We will do blood work next week and titrate up the dose as tolerated.   21: She iS here with her  today. Getting Herceptin today. She is on full dose of tucatinib 2 pills twice daily. Reports that constipation is resolved. She had 3 episodes of semisolid bowel movements, improved with Imodium. She denies nausea or vomiting. No other concerning symptoms. I recommend to start Xeloda and increase the dose to 3 pills twice daily 1 week on 1 week off. She will call if any concerning symptoms specifically diarrhea. Change in dose was discussed with the patient and . Written instructions were provided as well. Appointment for 3 weeks Herceptin and echocardiogram was discussed. Addendum: Blood work from 2021 showed rising creatinine to 1.93. She has diabetes and hypertension. Patient did not report severe diarrhea but this rising creatinine is concerning. I recommend that she comes for IV fluid this week and we will repeat CMP. She will hold Xeloda in the meantime. hold and Iv luids.   21 Saw patient in tx room today she is here for IV fluids #3 this as she has ongoing loose stools and decreased appetite x 1 week. On 7/3/21 she was also noted to have hypocalcemia and elevated creatinine. Xeloda was stopped on 21 and patient was instructed to decrease Tucatinib to 1 pill BID. Since decreasing Tucatinib she has about 4 diarrhea episodes daily. Instructed to continue Imodium prn. She reports  cough productive of scant amount of yellow mucous x 4 days no fever mild SOB when going up the stairs but this is not new. No SOB with regular activity.    2021 Today she reports that she is compliant with pills.  She reports severe fatigue,  unable to do household chores, lower extremity swelling, loose watery diarrhea 2-3 times a day, maximum 4 times, taking Imodium 2-3 times a day.  She has nausea even with water, unable to eat much, poor taste.  She is taking antinausea without much relief.  GERD is severe since PPI was held (interaction with Xeloda), not much relieved with Pepcid. Patient was crying today and said she cannot do it anymore.  I recommend to stop Xeloda and tucatinib for now.  She can restart PPI.  She will be treated with Herceptin today.  We will draw routine labs.  Stat BMP showed that creatinine is stable. 3-month brain MRI follow-up is coming up soon.  We will reevaluate after brain MRI.  Options include either switching to Herceptin Perjeta and radiating the brain lesions or consider Enhertu as it does have some CNS penetration. We will hold off CT imaging for now due to slight bump in creatinine.  Echo ordered today   5/3/2023  Patient seen in tx room today s/p syncopal episode/fall 2022 2/2 hypoglycemia. She reports feeling dizzy prior to episode. She was seen in ER CT head 4/10/23: stable No evidence of acute cortical infarction or hemorrhage. No dizziness headache or other neuro symptoms presently. D/w Dr. Noel RICO to proceed with Kanjinti/perjeta today. Reports knee pain and will f/u PCP 23 ECHO 4/10/23 LVEF 61% No CP or SOB Patient had imaging done on 3/2023. Upon review of CT films, my interpretation is that patient has STABLE disease. I reviewed these findings with the patient   2024 Here for Kanjinti perjeta today No cp sob, no GI sx with HP  She is on Xgeva q 6 month and is noted to have mild hypocalcemia. Ca 8.3  Rec to continue Ca suppls 600 mg BID while on Xgeva  Will repeat CMP today.. BP high frequently, WNL today. Rec to see cardioonc. BS better now MRI 10/2022, 2023 stable. Fb Dr Lundberg. MRI 2023 showed POD, s/p SRS 2023, repeat MRI 2024 SD CT 2023 SD , 2024 SD She is on xgeva q6 m as she has been on it for > 4 yrs. Last dental exam 2021. Last Xgeva  23, 24. Next due 2024 ECHO Q3M 23 LVEF 55% to 60%, 10/2023, ECHo due 2024 She reports family issues, her  has worsening Parkinson's disease and 2 falls. son had hernia surgery  24: Patient is here today for Kanjinti/Perjecta- Q3 weeks.  Patient denies any SOB or C/N/V/D. She reports eating well, with no changes in appetite and no weight change.  Patient is on XGEVA, Q 6 months. Next dose due: 24. Patient denies any dental issues.  Patient reports significant pitting edema in her bilateral extremities x 2 months. Denies that it improves with elevation and can be painful. Office to r/o DVT. Bilateral Venous Duplex ordered, ASAP. Referral to Nephrology and Cardiology (appt: ) also placed. Ms. Cardozo reports she discussed this concern with her PCP at the last visit who instructed her to take 2 Hydrochlorothiazide pills daily to see if s/s resolved. Patient reports s/s have remained consistent. Creatinine has progressively increased over the last year.  Ms. Cardozo underwent her last ECHO on 24, next imaging: OVERDUE. Orders placed for ASAP Imaging. Results showin. Left ventricular cavity is normal. Left ventricular wall thickness is mildly increased. Left ventricular systolic function is normal with an ejection fraction of 67 % by Braden's method of disks. Last CT C/A/P: 14; Stable disease Ms. Cardozo reports her son has successfully healed from his hernia s/x . Unfortunately, her  continue to decline and will be entering a Rehabilitation Facility in [place]. Patient also request a letter to be written on behalf of her sister-in-law coming from [place] to be a caregiver to her spouse as she is unable to care for him given her treatment schedule and general overall health.  Treatment held 2/2 overdue ECHO and new s/s of b/l LE swelling (2+pitting) RTC:  2 months with treatment; patient to obtain ECHO and Cardiology clearance prior to next infusion. CT before next visit   2025 Patient is here today for Kanjinti/Perjecta- Q3 weeks. Will repeat imaging studies in 4-6 months. Last CT C/A/P completed 2025; SD noted. Plan to continue HP & Anastrozole until POD. Will order imaging at follow-up visit in May 2025.  Patient denies any Cardiopulmonary s/s or C/N/V/D. She reports she is eating well, with no changes in appetite and no weight change. Patient reported her pain today was a 10/10 concentrating in her back and knees. Discussed adding in breakthrough medication to supplement OTC Tylenol with Oxycodone, 5mg every 6 hours. Patient was relieved and wished for prescription to be sent to [facility] on file. 2 mg IV Morphine given in treatment room. Pain relieved with x 1 dose to 2/10.  Ms. Cardozo is UTD with Cardiology. Patient saw Dr. Holman last on 2025. Patient has longstanding DM2 & HTN, as well as venous insufficiency, diastolic dysfunction (noted on ECHO) with normal PRO-BNP's. LE edema continues to be persistent (+2 on PE today), with some improvement with trial of loop diuretic per patient. Patient was encouraged to follow-up with Vascular so she can be evaluated/measured for compression garments. BP remains slightly elevated, but improved with medication adjustments. Patient is currently taking Losartan 100 mg daily, HCTZ 25 mg daily with 25 mg of Spironolactone PRN, and Carvedilol 3.125 BID. Last ECHO was completed on 2025 with a LVEF of 60%. Repeat imaging Q3 months; imaging ordered/managed by Cardiology.  Patient established care with Dr. Suárez (Nephrology) for CKD. At initial consult, it was thought that the LE edema was unlikely related to her kidney disease. Patient was encouraged to discontinue use of NSAIDS and follow-up in 4-6 months. Since initial evaluation, Creatinine up trending to 1.76 on 2025. Encouraged patient to follow-up with Nephrology at this time. Patient is UTD with Dr. Lundberg; last seen on 2025. Patient denies any neurologic s/s to include: nausea, vomiting, diplopia, blurry vision, unsteady gait or headaches. MR Head last completed 2025; stable disease with no new lesions identified. Patient to undergo repeat testing in 3 months and follow-up accordingly with Dr. Lundberg. RTC: with each infusion; Q3 weeks   2025 Patient had imaging done on 2024 . Upon review of CT films, my interpretation is that patient has STABLE disease. I reviewed these findings with the patient Patient is here today for Kanjinti/Perjecta- Q3 weeks.  Patient denies any SOB or C/N/V/D. She reports eating well, with no changes in appetite and no weight change.  Patient is on XGEVA, Q 6 months.  Patient denies any dental issues.  Next  Last ECHO: Ordered Patient established care with Dr. Holman on 2024. BP high, rec to f/u with him  Patient established care with Dr. Suárez (Nephrology) for CKD. Cr stable  Otherwise feeling well.  F/b Dr Lundberg for brain MRI and brain metastases   2025: Patient is here today for Kanjinti/Perjecta- Q3 weeks. Will repeat imaging studies in 4-6 months. Last CT C/A/P completed 2025; SD noted. Plan to continue HP & Anastrozole until POD. PET/CT ordered today secondary to kidney disease and up trending creatinine values with considerable side effects associated: +3 pitting edema, elevated BP, and 10/10 LE pain.  Patient denies any SOB, s/s or C/N/V/D. She reports she is eating well, with no changes in appetite and no weight change. Patient reported her pain today was a 10/10 concentrating in her knees. Discussed referral to Palliative Care for tighter pain control, but declines today. Refill of 5 mg Oxycodone sent to [facility] on file. 1 mg IV Hydromorphone given in treatment room. Discussed ability to complete ADL's and take care of her ailing spouse. The patient reports she is experiencing some challenges and is working to bring her sister back from her home country.  Patient UTD with PCP; last seen by Dr. Willis on 13 May 2025. Lab work and Bilateral Venous Duplexes ordered. Imaging completed on 23 May 2025; no acute findings. Referral to Vascular Surgery sent; scheduled for 3 Dina 2025. Ms. Cardozo is UTD with Cardiology. Patient saw Dr. Holman last on 2025. Patient has longstanding DM2 & HTN, as well as venous insufficiency, diastolic dysfunction (noted on ECHO) with normal PRO-BNP's. LE edema continues to be persistent (+3 on PE today), with some improvement with trial of loop diuretic per patient.  BP remains slightly elevated, but improved with medication adjustments. Patient is currently taking Losartan 100 mg daily, HCTZ 25 mg daily with 25 mg of Spironolactone PRN, and Carvedilol 3.125 BID. Last ECHO was completed on 2025 with a LVEF of 60%. Repeat imaging Q3 months; imaging ordered/managed by Cardiology.  Patient is UTD with Dr. Lundberg; last seen on 2025. Patient denies any neurologic s/s to include: nausea, vomiting, diplopia, blurry vision, unsteady gait or headaches. MR Head last completed 2025; stable disease with no new lesions identified. Patient to undergo repeat testing in 3 months and follow-up accordingly with Dr. Lundberg; scheduled for 2025. RTC: with each infusion; Q3 weeks  2025: Patient is here today for Kanjinti/Perjeta- Q3 weeks. Will repeat imaging studies in 4-6 months. Last CT C/A/P completed 2025; SD noted. Plan to continue HP & Anastrozole until POD.  Patient denies any SOB, s/s or C/N/V/D. She reports she is eating well, with no changes in appetite and no weight change. Patient reported her pain today was a 9/10 concentrating in her bilateral knees (Left > Right). Again, discussed referral to Palliative Care for tighter pain control, but declines today. 1 mg IV Hydromorphone given in treatment room. Discussed ability to complete ADL's and take care of her ailing spouse. Patient requesting letter in support of her sister traveling from [place] to assist with care of both her and her  who has end-stage Parkinson's Disease. Letter provided today to patient in hopes her sister will be able to come to the [place] for another 90 days.  PET/CT ordered at last visit secondary to kidney disease and up trending creatinine values with considerable side effects associated: +3 pitting edema, elevated BP, and 10/10 LE pain. Patient was unable to schedule. Office assisted patient in scheduling for:  at [facility].  Patient is UTD with Dr. Lundberg; last seen on 2025. Patient denies any neurologic s/s to include: nausea, vomiting, diplopia, blurry vision, unsteady gait or headaches. MR Head last completed 30 MAY 2025; redemonstrated multifocal enhancing lesions compatible with patient's history of metastatic disease. Lesions were grossly similar in size and associated edema. Stable encephalomalacia and gliosis in the right medial temporal lobe. No new lesions were noted. Ms. Cardozo is UTD with Cardiology. Patient saw Dr. Holman last on 2025. Patient has longstanding DM2 & HTN, as well as venous insufficiency, diastolic dysfunction (noted on ECHO) with normal PRO-BNP's. LE edema continues to be persistent (+3 on PE today), with some improvement with trial of loop diuretic per patient.  BP remains slightly elevated, but improved with medication adjustments. Patient is currently taking Losartan 100 mg daily, HCTZ 25 mg daily with 25 mg of Spironolactone PRN, and Carvedilol 3.125 BID. Last ECHO was completed on 2025 with a LVEF of 60%. Repeat imaging Q3 months; imaging ordered today. Patient next scheduled with Dr. Whitehead on 2025. Encouraged patient to self-schedule. Patient UTD with PCP; last seen by Dr. Willis on 13 May 2025. Lab work and Bilateral Venous Duplexes ordered. Imaging completed on 23 May 2025; no acute findings. Referral to Vascular Surgery sent; scheduled for 2025. RTC: with each infusion; Q3 weeks  25: Patient is here today for Kanjinti/Perjeta- Q3 weeks. Last PET/CT completed on 2025; SD noted. Plan to continue HP & Anastrozole until POD. Will repeat imaging studies in 4-6 months.  Patient denies any SOB, s/s or C/N/V/D. She reports she is eating well, with no changes in appetite and no weight change. Patient is UTD with Dr. Lundberg; last seen on 2025. Patient denies any neurologic s/s to include: nausea, vomiting, diplopia, blurry vision, unsteady gait or headaches. MR Head last completed 30 MAY 2025; redemonstrated multifocal enhancing lesions compatible with patient's history of metastatic disease. Lesions were grossly similar in size and associated edema. Stable encephalomalacia and gliosis in the right medial temporal lobe. No new lesions were noted. Patient scheduled to be seen on 10 September 2025. Ms. Cardozo is UTD with Cardiology. Patient saw Dr. Holman last on 2025. Patient has longstanding DM2 & HTN, as well as venous insufficiency, diastolic dysfunction (noted on ECHO) with normal PRO-BNP's. LE edema continues to be persistent (+3 on PE today), with some improvement with trial of loop diuretic per patient.  BP remains slightly elevated, but improved with medication adjustments. Patient is currently taking Losartan 100 mg daily, HCTZ 25 mg daily with 25 mg of Spironolactone PRN, and Carvedilol 3.125 BID. Last ECHO was completed on 2025 with a LVEF of 60%. Repeat imaging Q3 months; imaging ordered today. Patient next scheduled with Dr. Whitehead on 2025. Encouraged patient to self-schedule. Patient established care with Vascular Surgery, Dr. De La Cruz on 2025. The patient reports he was told she is a candidate to endovenous ablation treatment and is contemplating scheduling the procedure.  Of note, the patient's sister was approved for another 90 days to travel from [place] to assist her family. Ms. Cardozo plans to file for an extension of another 90 days once she arrives on 2025.  The patient request a letter for her pension plan today stating she is currently being treated by our office. Letter provided to patient same day.  SHE IS ON XGEVA Q6M. DUE 2025, WILL ADD TO NEXT TREATMENT. NEXT DOSE 2026 RTC: every 3 weeks with treatment [FreeTextEntry1] : Started THP on 7/10/17, on HP now\par  Anastrazole 1/2018\par  XTH 6/2021- 8/2021\par  HP 8/2021 [de-identified] : Ms. MONSERRAT CARDOZO is here for follow up visit for metastatic breast cancer. She completed THP 7/2017, HP and Anastrazole. Progression of disease in the brain, started Xeloda Herceptin and tucatinib 6/2021. MRI 8/2021 showed response but pt could not tolerate XHT regimen despite dose reduction. HP restarted 8/2021.  7/30/2025: Patient is here today for Kanjinti/Perjeta- Q3 weeks. Last PET/CT completed on 30 June 2025; SD noted. Plan to continue HP & Anastrozole until POD. Will repeat imaging studies in 4-6 months.  Patient denies any SOB, s/s or C/N/V/D. She reports she is eating well, with no changes in appetite and no weight change. Refilled anastrozole and omeprazole today.  Patient is UTD with Dr. Lundberg; last seen on 04 JUNE 2025. Patient denies any neurologic s/s to include: nausea, vomiting, diplopia, blurry vision, unsteady gait or headaches. MR Head last completed 30 MAY 2025; redemonstrated multifocal enhancing lesions compatible with patient's history of metastatic disease. Lesions were grossly similar in size and associated edema. Stable encephalomalacia and gliosis in the right medial temporal lobe. No new lesions were noted. Patient scheduled to be seen on 10 September 2025. Ms. Cardozo is UTD with Cardiology. Patient saw Dr. Holman last on 14 July 2025. Patient has longstanding DM2 & HTN, as well as venous insufficiency, diastolic dysfunction (noted on ECHO) with normal PRO-BNP's. LE edema continues to be persistent (+3 on PE today), with some improvement.  BP  today is improved. Patient is currently taking Losartan 100 mg daily, HCTZ 25 mg daily with 25 mg of Spironolactone PRN, and Carvedilol 3.125 BID. Farxiga added for diastolic dysfunction, CKD and uncontrolled DM2.  Last ECHO was completed on 23 June 2025 with a LVEF of 62%. Repeat imaging Q3 months; next due: SEPTEMBER 2025.  Patient next scheduled with Dr. Whitehead on 12 JANUARY 2026. Bone Health: Patient is current on XGEVA Q6 months. Last dose: TODAY, next dose due: January/February 2026.  RTC: every 3 weeks with treatment

## 2025-07-30 NOTE — ASSESSMENT
[Palliative] : Goals of care discussed with patient: Palliative [FreeTextEntry1] : This is a 75-year-old very pleasant  lady, with medical history significant for diabetes, dyslipidemia, hypertension and hypothyroidism, who is diagnosed with metastatic stage IV ER positive, PA negative, HER-2/douglas positive breast cancer. She presented with symptomatic brain metastasis and completed gamma knife radiosurgery to the brain metastases. She also has bone metastases, lymph node metastasis and peritoneal metastasis. She started THP on 7/10/17. Excellent response to chemo clinically, inflammatory changes resolved, CT scans after 4 cycles showed excellent response except one new sclerotic focus on L3 which was radiated. PET 3/2018 showed good response, Brain MRI shows new small lesion. She is on Arimidex + Herceptin, Perjeta. Progression of disease in the brain, started Xeloda Herceptin and tucatinib 6/2021.  METASTATIC BREAST CANCER WITH BRAIN METASTASES: Progression of disease in the brain, started Xeloda Herceptin and Tucatinib 6/2021. MRI 8/2021 showed response but pt could not tolerate XHT regimen despite dose reduction. HP restarted 8/2021. Patient is tolerating Anastrozole, Herceptin & Perjeta.  Patient is here today for Kanjinti/Perjeta- Q3 weeks. Last PET/CT completed on 30 June 2025; SD noted. Plan to continue HP & Anastrozole until POD. Will repeat imaging studies in 4-6 months.  Patient denies any SOB, s/s or C/N/V/D. She reports she is eating well, with no changes in appetite and no weight change. Refilled anastrozole and omeprazole today. RTC: every 3 weeks with treatment    BRAIN METASTASES: MRI 9/2023 POD- s/p SRS MRI 12/2024 SD MRI 3/2024 SD MRI 5/2025 SD  Patient is UTD with Dr. Lundberg; last seen on 04 JUNE 2025. Patient denies any neurologic s/s to include: nausea, vomiting, diplopia, blurry vision, unsteady gait or headaches. MR Head last completed 30 MAY 2025; redemonstrated multifocal enhancing lesions compatible with patient's history of metastatic disease. Lesions were grossly similar in size and associated edema. Stable encephalomalacia and gliosis in the right medial temporal lobe. No new lesions were noted. Patient scheduled to be seen on 10 September 2025.  HTN & RISK FOR CARDIOTOXICITY: Ms. Cardozo is UTD with Cardiology. Patient saw Dr. Holman last on 14 July 2025. Patient has longstanding DM2 & HTN, as well as venous insufficiency, diastolic dysfunction (noted on ECHO) with normal PRO-BNP's. LE edema continues to be persistent (+3 on PE today), with some improvement.  BP remains slightly elevated, but improved with medication adjustments. Patient is currently taking Losartan 100 mg daily, HCTZ 25 mg daily with 25 mg of Spironolactone PRN, and Carvedilol 3.125 BID. Farxiga added for diastolic dysfunction, CKD and uncontrolled DM2.  Last ECHO was completed on 23 June 2025 with a LVEF of 62%. Repeat imaging Q3 months; next due: September 2025. Patient next scheduled with Dr. Whitehead on 12 JANUARY 2026.  BONE METASTASES: She is on XGEVA Q6M. She reports she saw dental 4/2021 and exam was good. Last XGEVA given: 7/30/2025.  NEXT DOSE JAN 2026  PERITONEAL METASTASES: No ascites on PE. Will continue to monitor.  DIABETES: Patient established care with Dr. Suárez (Nephrology) for CKD. At initial consult, it was thought that the LE edema was unlikely related to her kidney disease. Patient was encouraged to discontinue use of NSAIDS and follow-up in 4-6 months. Since initial evaluation, Creatinine up trending to 1.76 on 14 April 2025. Encourage patient at each visit to follow-up with Nephrology at this time.  - Elevated tumor markers: stable with minor fluctuations noted. Trend q 2 months  Continue Grisel Garrido Q3 weeks; appointments made through MID NOVEMBER 2025 XGEVA Q6 months; next due Jan 2026 RTC 3 weeks

## 2025-07-30 NOTE — ASSESSMENT
[Palliative] : Goals of care discussed with patient: Palliative [FreeTextEntry1] : This is a 75-year-old very pleasant  lady, with medical history significant for diabetes, dyslipidemia, hypertension and hypothyroidism, who is diagnosed with metastatic stage IV ER positive, MN negative, HER-2/douglas positive breast cancer. She presented with symptomatic brain metastasis and completed gamma knife radiosurgery to the brain metastases. She also has bone metastases, lymph node metastasis and peritoneal metastasis. She started THP on 7/10/17. Excellent response to chemo clinically, inflammatory changes resolved, CT scans after 4 cycles showed excellent response except one new sclerotic focus on L3 which was radiated. PET 3/2018 showed good response, Brain MRI shows new small lesion. She is on Arimidex + Herceptin, Perjeta. Progression of disease in the brain, started Xeloda Herceptin and tucatinib 6/2021.  METASTATIC BREAST CANCER WITH BRAIN METASTASES: Progression of disease in the brain, started Xeloda Herceptin and Tucatinib 6/2021. MRI 8/2021 showed response but pt could not tolerate XHT regimen despite dose reduction. HP restarted 8/2021. Patient is tolerating Anastrozole, Herceptin & Perjeta.  Patient is here today for Kanjinti/Perjeta- Q3 weeks. Last PET/CT completed on 30 June 2025; SD noted. Plan to continue HP & Anastrozole until POD. Will repeat imaging studies in 4-6 months.  Patient denies any SOB, s/s or C/N/V/D. She reports she is eating well, with no changes in appetite and no weight change. Refilled anastrozole and omeprazole today. RTC: every 3 weeks with treatment    BRAIN METASTASES: MRI 9/2023 POD- s/p SRS MRI 12/2024 SD MRI 3/2024 SD MRI 5/2025 SD  Patient is UTD with Dr. Lundberg; last seen on 04 JUNE 2025. Patient denies any neurologic s/s to include: nausea, vomiting, diplopia, blurry vision, unsteady gait or headaches. MR Head last completed 30 MAY 2025; redemonstrated multifocal enhancing lesions compatible with patient's history of metastatic disease. Lesions were grossly similar in size and associated edema. Stable encephalomalacia and gliosis in the right medial temporal lobe. No new lesions were noted. Patient scheduled to be seen on 10 September 2025.  HTN & RISK FOR CARDIOTOXICITY: Ms. Cardozo is UTD with Cardiology. Patient saw Dr. Holman last on 14 July 2025. Patient has longstanding DM2 & HTN, as well as venous insufficiency, diastolic dysfunction (noted on ECHO) with normal PRO-BNP's. LE edema continues to be persistent (+3 on PE today), with some improvement.  BP remains slightly elevated, but improved with medication adjustments. Patient is currently taking Losartan 100 mg daily, HCTZ 25 mg daily with 25 mg of Spironolactone PRN, and Carvedilol 3.125 BID. Farxiga added for diastolic dysfunction, CKD and uncontrolled DM2.  Last ECHO was completed on 23 June 2025 with a LVEF of 62%. Repeat imaging Q3 months; next due: September 2025. Patient next scheduled with Dr. Whitehead on 12 JANUARY 2026.  BONE METASTASES: She is on XGEVA Q6M. She reports she saw dental 4/2021 and exam was good. Last XGEVA given: 7/30/2025.  NEXT DOSE JAN 2026  PERITONEAL METASTASES: No ascites on PE. Will continue to monitor.  DIABETES: Patient established care with Dr. Suárez (Nephrology) for CKD. At initial consult, it was thought that the LE edema was unlikely related to her kidney disease. Patient was encouraged to discontinue use of NSAIDS and follow-up in 4-6 months. Since initial evaluation, Creatinine up trending to 1.76 on 14 April 2025. Encourage patient at each visit to follow-up with Nephrology at this time.  - Elevated tumor markers: stable with minor fluctuations noted. Trend q 2 months  Continue Grisel Garrido Q3 weeks; appointments made through MID NOVEMBER 2025 XGEVA Q6 months; next due Jan 2026 RTC 3 weeks

## 2025-07-30 NOTE — PHYSICAL EXAM
[Restricted in physically strenuous activity but ambulatory and able to carry out work of a light or sedentary nature] : Status 1- Restricted in physically strenuous activity but ambulatory and able to carry out work of a light or sedentary nature, e.g., light house work, office work [Obese] : obese [Normal] : affect appropriate [de-identified] : port in left chest wall c/d/i , chronic ankle/pedal swelling noted  rt. >left.. Chronic stable B/L LE edema  [de-identified] : Right breast- right breast slightly bigger than left breast + skin thickening in the outer lower quadrant with vague fullness. cellulitis resolved. No LN palpable UNCHANGED [de-identified] : bilateral pitting edema+2